# Patient Record
Sex: FEMALE | HISPANIC OR LATINO | Employment: OTHER | URBAN - METROPOLITAN AREA
[De-identification: names, ages, dates, MRNs, and addresses within clinical notes are randomized per-mention and may not be internally consistent; named-entity substitution may affect disease eponyms.]

---

## 2016-10-31 LAB
C TRACH DNA SPEC QL PROBE+SIG AMP: NEGATIVE
N GONORRHOEA DNA SPEC QL PROBE+SIG AMP: NEGATIVE

## 2016-11-11 LAB
HBV SURFACE AG SERPL QL IA: NEGATIVE
HIV 1+2 AB+HIV1 P24 AG SERPL QL IA: NEGATIVE
RUBELLA ANTIBODY IGG QUANTITATIVE: NORMAL IU/ML

## 2017-03-12 ENCOUNTER — TELEPHONE (OUTPATIENT)
Dept: OBGYN | Facility: CLINIC | Age: 32
End: 2017-03-12

## 2017-03-12 NOTE — TELEPHONE ENCOUNTER
Patient had a clinical confirmation done at [facility] in [place]. Can she skip the ob confirmation with Jing and just schedule her new prenatal visit with Dr. Buchanan? She is also about 23 weeks pregnant right now. What is recommended? Please call to discuss and have  available.    Kenzie ODOM  Central Scheduler

## 2017-03-13 NOTE — TELEPHONE ENCOUNTER
Called  services and patient, scheduled her OB appointment at  Clinic on 03/20/2017 with Dr. Buchanan.  Sangita Jarrett CMA

## 2017-03-20 ENCOUNTER — PRENATAL OFFICE VISIT (OUTPATIENT)
Dept: OBGYN | Facility: CLINIC | Age: 32
End: 2017-03-20
Payer: COMMERCIAL

## 2017-03-20 VITALS
WEIGHT: 136 LBS | SYSTOLIC BLOOD PRESSURE: 98 MMHG | HEIGHT: 59 IN | HEART RATE: 73 BPM | DIASTOLIC BLOOD PRESSURE: 57 MMHG | TEMPERATURE: 97.1 F | BODY MASS INDEX: 27.42 KG/M2

## 2017-03-20 DIAGNOSIS — Z34.80 SUPERVISION OF OTHER NORMAL PREGNANCY, ANTEPARTUM: ICD-10-CM

## 2017-03-20 PROCEDURE — 99212 OFFICE O/P EST SF 10 MIN: CPT | Performed by: OBSTETRICS & GYNECOLOGY

## 2017-03-20 RX ORDER — ECHINACEA PURPUREA EXTRACT 125 MG
1-2 TABLET ORAL
COMMUNITY
Start: 2017-02-24 | End: 2017-03-20

## 2017-03-20 RX ORDER — HYDROXYZINE HYDROCHLORIDE 10 MG/5ML
4 SYRUP ORAL
COMMUNITY
Start: 2017-02-24 | End: 2017-03-20

## 2017-03-20 RX ORDER — PYRIDOXINE HCL (VITAMIN B6) 25 MG
25 TABLET ORAL
COMMUNITY
Start: 2016-11-11 | End: 2017-07-24

## 2017-03-20 RX ORDER — MULTIVIT 47/IRON/FOLATE 1/DHA 27-1-300MG
CAPSULE ORAL
COMMUNITY
Start: 2016-11-11 | End: 2018-06-08

## 2017-03-20 NOTE — MR AVS SNAPSHOT
After Visit Summary   3/20/2017    Jo Ann Beckman    MRN: [id number]           Patient Information     Date Of Birth          1985        Visit Information        Provider Department      3/20/2017 2:30 PM Arnaldo Buchanan MD; [employer] [facility]        Care Instructions                                                        If you have any questions regarding your visit, Please contact your care team.     [facility]: [phone]    [facility] CLINIC HOURS TELEPHONE NUMBER   Arnaldo Buchanan M.D.      [place]-    Tyrese Lopez-FRANCOISE Nix-Medical Assistant   Monday-[place]  8:00a.m-4:45 p.m  Wednesday-[place] 8:00a.m-4:45 p.m.  [facility]  8:00a.m-4:45 p.m.  Friday-[place]  8:00a.m-4:45 p.m. [facility]  [address]  [place], [place] [address] [phone] ask [facility]  [phone] Fax  Imaging [phone]    [facility] Labor and Delivery  [address] [place] [address] Dr.  [place], [place] [address] [phone]    [facility]  [address]  [place], [place] [address] [phone] ask [facility]  [phone] Fax  Imaging [phone]     Urgent Care locations:    [facility] Monday-Friday  5 pm - 9 pm  Saturday and Sunday   9 am - 5 pm    Monday-Friday   11 am - 9 pm  Saturday and Sunday   9 am - 5 pm   [phone]       If you need a medication refill, please contact your pharmacy. Please allow 3 business days for your refill to be completed.  As always, Thank you for trusting us with your healthcare needs!          Follow-ups after your visit        Who to contact     If you have questions or need follow up information about today's clinic visit or your schedule please contact [facility] directly at [phone].  Normal or non-critical lab and imaging results will be  "communicated to you by Zomazzhart, letter or phone within 4 business days after the clinic has received the results. If you do not hear from us within 7 days, please contact the clinic through Ziegler or phone. If you have a critical or abnormal lab result, we will notify you by phone as soon as possible.  Submit refill requests through Ziegler or call your pharmacy and they will forward the refill request to us. Please allow 3 business days for your refill to be completed.          Additional Information About Your Visit        Ziegler Information     Ziegler lets you send messages to your doctor, view your test results, renew your prescriptions, schedule appointments and more. To sign up, go to [email or web address] . Click on \"Log in\" on the left side of the screen, which will take you to the Welcome page. Then click on \"Sign up Now\" on the right side of the page.     You will be asked to enter the access code listed below, as well as some personal information. Please follow the directions to create your username and password.     Your access code is: [email or web address]  Expires: 2017  2:48 PM     Your access code will  in 90 days. If you need help or a new code, please call your [place] clinic or [phone].        Care EveryWhere ID     This is your Care EveryWhere ID. This could be used by other organizations to access your [place] medical records  [id number]        Your Vitals Were     Pulse Temperature Last Period Breastfeeding?          73 97.1  F (36.2  C) (Oral) 10/01/2016 No         Blood Pressure from Last 3 Encounters:   17 98/57    Weight from Last 3 Encounters:   17 61.7 kg (136 lb)              Today, you had the following     No orders found for display         Today's Medication Changes          These changes are accurate as of: 3/20/17  2:48 PM.  If you have any questions, ask your nurse or doctor.               Stop taking these medicines if you haven't already. Please " contact your care team if you have questions.     chlorpheniramine 4 MG tablet   Commonly known as:  CHLOR-TRIMETON   Stopped by:  Arnaldo Buchanan MD           sodium chloride 0.65 % nasal spray   Commonly known as:  OCEAN   Stopped by:  Arnaldo Buchanan MD                    Primary Care Provider    [facility]       No address on file        Thank you!     Thank you for choosing [facility]  for your care. Our goal is always to provide you with excellent care. Hearing back from our patients is one way we can continue to improve our services. Please take a few minutes to complete the written survey that you may receive in the mail after your visit with us. Thank you!             Your Updated Medication List - Protect others around you: Learn how to safely use, store and throw away your medicines at www.disposemymeds.org.          This list is accurate as of: 3/20/17  2:48 PM.  Always use your most recent med list.                   Brand Name Dispense Instructions for use    pyridOXINE 25 MG tablet    vitamin B-6     Take 25 mg by mouth       NERIS DHA 27-0.6-0.4-300 MG Caps

## 2017-03-20 NOTE — NURSING NOTE
"Chief Complaint   Patient presents with     Prenatal Care     1st initial OB visit       Initial BP 98/57 (BP Location: Right arm, Patient Position: Chair, Cuff Size: Adult Regular)  Pulse 73  Temp 97.1  F (36.2  C) (Oral)  Ht 1.505 m (4' 11.25\")  Wt 61.7 kg (136 lb)  LMP 10/01/2016  Breastfeeding? No  BMI 27.24 kg/m2 Estimated body mass index is 27.24 kg/(m^2) as calculated from the following:    Height as of this encounter: 1.505 m (4' 11.25\").    Weight as of this encounter: 61.7 kg (136 lb).  Medication Reconciliation: complete   Boyd Smith CMA      "

## 2017-03-20 NOTE — PATIENT INSTRUCTIONS
If you have any questions regarding your visit, Please contact your care team.     [facility] [place] [facility]: [phone]    [facility] CLINIC HOURS TELEPHONE NUMBER   KAT Snowden-    Tyrese Lopez-FRANCOISE Nix-Medical Assistant   Monday-[place]  8:00a.m-4:45 p.m  Wednesday-[place] 8:00a.m-4:45 p.m.  Thursday-[place]  8:00a.m-4:45 p.m.  Friday-[place]  8:00a.m-4:45 p.m. [facility]  [address]  [place], [place] [address] [phone] ask [facility]  [phone] Fax  Imaging [phone]    [facility] Labor and Delivery  [address] [place] [address] Dr.  [place], [place] [address] [phone]    [facility]  [address] [place] [address]  [phone] ask [facility]  [phone] Fax  Imaging [phone]     Urgent Care locations:    [place]        [place] Monday-Friday  5 pm - 9 pm  Saturday and Sunday   9 am - 5 pm    Monday-Friday   11 am - 9 pm  Saturday and Sunday   9 am - 5 pm   [phone]       If you need a medication refill, please contact your pharmacy. Please allow 3 business days for your refill to be completed.  As always, Thank you for trusting us with your healthcare needs!

## 2017-03-21 PROBLEM — Z34.80 SUPERVISION OF OTHER NORMAL PREGNANCY, ANTEPARTUM: Status: ACTIVE | Noted: 2017-03-21

## 2017-03-22 NOTE — PROGRESS NOTES
"\"Sheryl savage uh\" OB Transfer from . Referred by friend/pt and desires [facility]. Recent bronchitis that is improving after abx. Recent survey u/s suggested ?hydrocephalus and has Level 2 u/s with MFM soon- direct report to me. No signif signs, symptoms or concerns otherwise now. Discussed continuing OB care. Here with  and . Advice as per Checklist update. Discussed condition, tests and care plan including RBA. Problem list updated. 1h GTT next.  Arnaldo Buchanan MD       "

## 2017-04-18 LAB — T PALLIDUM IGG SER QL: NEGATIVE

## 2017-05-16 LAB — GLU GEST SCREEN 1HR 50G: 110

## 2017-05-18 ENCOUNTER — PRE VISIT (OUTPATIENT)
Dept: MATERNAL FETAL MEDICINE | Facility: CLINIC | Age: 32
End: 2017-05-18

## 2017-05-18 DIAGNOSIS — O35.06X0 FETAL HYDROCEPHALUS AFFECTING MANAGEMENT OF MOTHER IN SINGLETON PREGNANCY: Primary | ICD-10-CM

## 2017-05-18 DIAGNOSIS — Z34.80 SUPERVISION OF OTHER NORMAL PREGNANCY, ANTEPARTUM: ICD-10-CM

## 2017-05-18 PROBLEM — O35.00X0 MATERNAL CARE FOR SUSPECTED CENTRAL NERVOUS SYSTEM MALFORMATION IN FETUS: Status: ACTIVE | Noted: 2017-03-21

## 2017-05-18 PROBLEM — O09.90 HIGH-RISK PREGNANCY: Status: ACTIVE | Noted: 2017-03-27

## 2017-05-24 ENCOUNTER — OFFICE VISIT (OUTPATIENT)
Dept: MATERNAL FETAL MEDICINE | Facility: CLINIC | Age: 32
End: 2017-05-24
Attending: OBSTETRICS & GYNECOLOGY
Payer: COMMERCIAL

## 2017-05-24 ENCOUNTER — HOSPITAL ENCOUNTER (OUTPATIENT)
Dept: ULTRASOUND IMAGING | Facility: CLINIC | Age: 32
Discharge: HOME | End: 2017-05-24
Attending: OBSTETRICS & GYNECOLOGY | Admitting: OBSTETRICS & GYNECOLOGY
Payer: COMMERCIAL

## 2017-05-24 VITALS
SYSTOLIC BLOOD PRESSURE: 110 MMHG | DIASTOLIC BLOOD PRESSURE: 67 MMHG | BODY MASS INDEX: 29.52 KG/M2 | RESPIRATION RATE: 18 BRPM | WEIGHT: 147.4 LBS | HEART RATE: 74 BPM

## 2017-05-24 DIAGNOSIS — O35.06X0 FETAL HYDROCEPHALUS AFFECTING MANAGEMENT OF MOTHER IN SINGLETON PREGNANCY: ICD-10-CM

## 2017-05-24 DIAGNOSIS — Z34.80 SUPERVISION OF OTHER NORMAL PREGNANCY, ANTEPARTUM: ICD-10-CM

## 2017-05-24 PROCEDURE — 76819 FETAL BIOPHYS PROFIL W/O NST: CPT | Performed by: OBSTETRICS & GYNECOLOGY

## 2017-05-24 PROCEDURE — 76811 OB US DETAILED SNGL FETUS: CPT

## 2017-05-24 PROCEDURE — 99211 OFF/OP EST MAY X REQ PHY/QHP: CPT

## 2017-05-24 NOTE — MR AVS SNAPSHOT
After Visit Summary   2017    Jo Ann Patel    MRN: [id number]           Patient Information     Date Of Birth          1985        Visit Information        Provider Department      2017 11:00 AM Alexis Odom MD [facility] [place]        Today's Diagnoses     Fetal hydrocephalus affecting management of mother in thomas pregnancy        Supervision of other normal pregnancy, antepartum           Follow-ups after your visit        Additional Services     MFM Office Visit           MFM Office Visit           MFM Office Visit           MFM Office Visit  (Weekly)                Your next 10 appointments already scheduled     May 31, 2017  1:30 PM CDT   New Patient Visit with Antoni Cruz MD   Peds Neurosurgery ([facility])    [facility]  [address]  [address] [place] [address]  [facility] [address]   [phone]            May 31, 2017  2:15 PM CDT   MFM BPP SINGLE with URMFMUSR4   [facility] [place] ([facility])    [address]  [facility] [address] [phone]            May 31, 2017  3:00 PM CDT   Ob Follow Up with UR EXAM RM 2   [facility] [place] ([facility])    [address]  [facility] [address]   [phone]            May 31, 2017  3:30 PM CDT    Visit with UR EXAM RM 2   [facility] [place] ([facility])    [address]  [facility] [address]   [phone]            May 31, 2017  3:30 PM CDT    Consult with UR  MD   [facility] [place] ([facility])    [address]  [facility] [address]   [phone]            2017  2:15 PM CDT   MFM BPP SINGLE with URMFMUSR3   [facility]  [facility]ound - [place] ([facility])    [address]  [facility] [address]   [phone]            Jun 05, 2017  3:00 PM CDT   Ob Follow Up with UR EXAM RM 2   [facility] [place] ([facility])    [address]  [facility] [address]   [phone]            Jun 12, 2017  1:30 PM CDT   MFM US COMPRE SINGLE F/U with URMFMUSR2   [facility] [place] ([facility])    [address]  [facility] [address]   [phone]           Wear comfortable clothes and leave your valuables at home.            Jun 12, 2017  2:15 PM CDT   Ob Follow Up with UR EXAM RM 2   [facility] [place] ([facility])    [address]  [facility] [address]   [phone]            Jun 19, 2017  1:30 PM CDT   MFM BPP SINGLE with URMFMUSR2   [facility] [place] ([facility])    [address]  [facility] [address]   [phone]              Future tests that were ordered for you today     Open Standing Orders        Priority Remaining Interval Expires Ordered    MFM Office Visit Routine 3/3 Weekly 5/24/2018 5/24/2017          Open Future Orders        Priority Expected Expires Ordered    MFM US Comprehensive Single F/U Routine 6/12/2017 5/24/2018 5/24/2017    MFM BPP Single Routine  3/24/2018 5/24/2017    MFM BPP Single Routine  3/24/2018 5/24/2017    MFM BPP Single Routine 5/31/2017 3/24/2018 5/24/2017    MFM Office Visit Routine 5/31/2017 5/24/2018 5/24/2017    MFM Office Visit Routine 5/31/2017 5/24/2018 5/24/2017    MFM Office Visit Routine  5/24/2018 5/24/2017            Who to contact     If you have questions or need follow up information about today's clinic visit or your schedule please contact  "[facility] directly at [phone].  Normal or non-critical lab and imaging results will be communicated to you by MyChart, letter or phone within 4 business days after the clinic has received the results. If you do not hear from us within 7 days, please contact the clinic through SIPphonehart or phone. If you have a critical or abnormal lab result, we will notify you by phone as soon as possible.  Submit refill requests through [email or web address] or call your pharmacy and they will forward the refill request to us. Please allow 3 business days for your refill to be completed.          Additional Information About Your Visit        SIPphoneharWeplay Information     [email or web address] lets you send messages to your doctor, view your test results, renew your prescriptions, schedule appointments and more. To sign up, go to [email or web address] . Click on \"Log in\" on the left side of the screen, which will take you to the Welcome page. Then click on \"Sign up Now\" on the right side of the page.     You will be asked to enter the access code listed below, as well as some personal information. Please follow the directions to create your username and password.     Your access code is: [email or web address]  Expires: 2017  2:48 PM     Your access code will  in 90 days. If you need help or a new code, please call your [place] clinic or [phone].        Care EveryWhere ID     This is your Care EveryWhere ID. This could be used by other organizations to access your [place] medical records  [id number]        Your Vitals Were     Pulse Respirations Last Period BMI (Body Mass Index)          74 18 10/01/2016 29.52 kg/m2         Blood Pressure from Last 3 Encounters:   17 110/67   17 98/57    Weight from Last 3 Encounters:   17 66.9 kg (147 lb 6.4 oz)   17 61.7 kg (136 lb)              We Performed the Following     [facility] Office Visit        Primary Care Provider    [facility]       No address on file        Thank you!     Thank you for choosing [facility]  for your care. Our goal is always to provide you with excellent care. Hearing back from our patients is one way we can continue to improve our services. Please take a few minutes to complete the written survey that you may receive in the mail after your visit with us. Thank you!             Your Updated Medication List - Protect others around you: Learn how to safely use, store and throw away your medicines at www.disposemymeds.org.          This list is accurate as of: 5/24/17  2:24 PM.  Always use your most recent med list.                   Brand Name Dispense Instructions for use    pyridOXINE 25 MG tablet    vitamin B-6     Take 25 mg by mouth       NERIS DHA 27-0.6-0.4-300 MG Caps

## 2017-05-24 NOTE — PROGRESS NOTES
Please refer to US report and consultation under 'Image' results of the 'Chart review' tab.    Alexis Odom M.D.      I have discussed today's finding of bilateral severe ventriculomegaly in this female fetus at 33 weeks and 4 days. The patient was being followed at outside institution but is transferring her care secondary to insurance. The ventriculomegaly was initially diagnosed at approximately 20 weeks.     We discussed the finding of severe bilateral cerebral ventriculomegaly and third ventricle dilatation. The 4th ventricle and the cisterna magna do not appear dilated.  The cerebral vasculature appears normal.     We also reviewed the fact that there were no additional US findings on the brain scan or while scanning the rest of the body.  I have discussed that approximately 10-50% of infants in whom severe ventriculomegaly is diagnosed prenatally and among whom it persists postnatally can have associated developmental abnormalities. The severity of disabilty depends on associated findings.     We also discussed the importance of performing adjunct testing to rule out genetic, infectious and other cerebral abnormalities. I have discussed the etiologies can involve inflammation secondary to congenital infections, structural abnormalities such as isolated aqueductal stenosis and genetic causes including aneuploidy, deletions, duplications or mutations. At  this gestational age it would be reasonable to defer additional testing until after delivery. The most important would be genetic evaluation with CGA and imaging studies with MRI. There are no findings concerning for CMV or toxoplasmosis on today's scan.    The patient agrees with this plan and will return for weekly prenatal care as well as for repeat growth assessment in 3 weeks.    We briefly discussed timing and mode of delivery. BPD today was greater than 10 cm making VD unlikely. I would recommend primary CD at term with precautions to avoid uterine  vessel lacerations secondary to very large head circumference.    Patient has appointments with neonatology and neurosurgery scheduled for next week. Will discuss timing of delivery in detail with both teams.    I spent a total of 40 minutes face-to-face with this patient counseling and coordinating care as described above for fetal ventriculomegaly. More than 50% of the time was in coordination of care and discussion of management of care.  A copy of this consultation is being faxed to your office.    Sincerely,  Alexis Odom M.D.  Maternal Fetal Medicine

## 2017-05-24 NOTE — NURSING NOTE
Jo Ann seen in clinic today for L2 and new OB visit at 33w4d (see report/notes).  present. VSS. Pt denies bldg/lof/change in discharge/contractions/headache/vision changes/chest pain/SOB/edema. Reviewed new OB folder; kick counts, PTL, HTN, phone numbers to call.  All teaching/info sheets given to patient in Swedish version. Discussed future visits. Dr. Odom met with pt and discussed POC. Plan weekly follow up with MFM: weekly OB visit, BPP, growth q 3 weeks, NICU/SW, and neurosurgery consult scheduled for 5/31.  Patient verbalized understanding. Pt discharged stable and ambulatory.     NICK Breaux RN  RN F:F 15 min

## 2017-05-31 ENCOUNTER — OFFICE VISIT (OUTPATIENT)
Dept: NEUROSURGERY | Facility: CLINIC | Age: 32
End: 2017-05-31
Attending: NEUROLOGICAL SURGERY
Payer: COMMERCIAL

## 2017-05-31 ENCOUNTER — HOSPITAL ENCOUNTER (OUTPATIENT)
Dept: ULTRASOUND IMAGING | Facility: CLINIC | Age: 32
Discharge: HOME | End: 2017-05-31
Attending: OBSTETRICS & GYNECOLOGY | Admitting: OBSTETRICS & GYNECOLOGY
Payer: COMMERCIAL

## 2017-05-31 ENCOUNTER — OFFICE VISIT (OUTPATIENT)
Dept: MATERNAL FETAL MEDICINE | Facility: CLINIC | Age: 32
End: 2017-05-31
Attending: OBSTETRICS & GYNECOLOGY
Payer: COMMERCIAL

## 2017-05-31 VITALS
RESPIRATION RATE: 18 BRPM | BODY MASS INDEX: 29.32 KG/M2 | HEART RATE: 75 BPM | SYSTOLIC BLOOD PRESSURE: 115 MMHG | WEIGHT: 146.4 LBS | DIASTOLIC BLOOD PRESSURE: 70 MMHG

## 2017-05-31 DIAGNOSIS — Z71.9 ENCOUNTER FOR COUNSELING: Primary | ICD-10-CM

## 2017-05-31 DIAGNOSIS — O35.06X0 FETAL HYDROCEPHALUS AFFECTING MANAGEMENT OF MOTHER IN SINGLETON PREGNANCY: ICD-10-CM

## 2017-05-31 DIAGNOSIS — Z34.80 SUPERVISION OF OTHER NORMAL PREGNANCY, ANTEPARTUM: ICD-10-CM

## 2017-05-31 DIAGNOSIS — O09.93 SUPERVISION OF HIGH RISK PREGNANCY, UNSPECIFIED, THIRD TRIMESTER: Primary | ICD-10-CM

## 2017-05-31 DIAGNOSIS — O35.00X0 MATERNAL CARE FOR SUSPECTED CENTRAL NERVOUS SYSTEM MALFORMATION IN FETUS, NOT APPLICABLE OR UNSPECIFIED FETUS: Primary | ICD-10-CM

## 2017-05-31 PROCEDURE — 99212 OFFICE O/P EST SF 10 MIN: CPT | Mod: ZF

## 2017-05-31 PROCEDURE — 99211 OFF/OP EST MAY X REQ PHY/QHP: CPT | Mod: 25,ZF

## 2017-05-31 PROCEDURE — 76819 FETAL BIOPHYS PROFIL W/O NST: CPT

## 2017-05-31 NOTE — MR AVS SNAPSHOT
After Visit Summary   5/31/2017    Jo Ann Patel    MRN: [id number]           Patient Information     Date Of Birth          1985        Visit Information        Provider Department      5/31/2017 3:00 PM Mercedes Nieves MD [facility] [place]        Today's Diagnoses     Supervision of high risk pregnancy, unspecified, third trimester    -  1    Fetal hydrocephalus affecting management of mother in thomas pregnancy           Follow-ups after your visit        Your next 10 appointments already scheduled     Jun 05, 2017  2:15 PM CDT   MFM BPP SINGLE with URMFMUSR3   [facility] [place] ([facility])    [address]  [facility] [address]   [phone]            Jun 05, 2017  3:00 PM CDT   Ob Follow Up with UR EXAM  2   [facility] [place] ([facility])    [address]  [facility] [address]   [phone]            Jun 12, 2017  1:30 PM CDT   M US COMPRE SINGLE F/U with URMFMUSR2   [facility] [place] ([facility])    [address]  [facility] [address] [phone]           Wear comfortable clothes and leave your valuables at home.            Jun 12, 2017  2:15 PM CDT   Ob Follow Up with UR EXAM  2   [facility] [place] ([facility])    [address]  [facility] [address]   [phone]            Jun 19, 2017  1:30 PM CDT   M BPP SINGLE with URMFMUSR2   [facility] [place] ([facility])    [address]  [facility] [address]   [phone]            Jun 19, 2017  2:15 PM CDT   Ob Follow Up with UR EXAM RM 2   [facility] [place] ([facility]  "[facility])    [address]  [place] [address]   [phone]              Who to contact     If you have questions or need follow up information about today's clinic visit or your schedule please contact [facility]AL MEDICINE [facility] directly at [phone].  Normal or non-critical lab and imaging results will be communicated to you by MyChart, letter or phone within 4 business days after the clinic has received the results. If you do not hear from us within 7 days, please contact the clinic through MyChart or phone. If you have a critical or abnormal lab result, we will notify you by phone as soon as possible.  Submit refill requests through Spiralcat or call your pharmacy and they will forward the refill request to us. Please allow 3 business days for your refill to be completed.          Additional Information About Your Visit        MyChart Information     Spiralcat lets you send messages to your doctor, view your test results, renew your prescriptions, schedule appointments and more. To sign up, go to [email or web address] . Click on \"Log in\" on the left side of the screen, which will take you to the Welcome page. Then click on \"Sign up Now\" on the right side of the page.     You will be asked to enter the access code listed below, as well as some personal information. Please follow the directions to create your username and password.     Your access code is: [email or web address]  Expires: 2017  2:48 PM     Your access code will  in 90 days. If you need help or a new code, please call your [place] clinic or [phone].        Care EveryWhere ID     This is your Care EveryWhere ID. This could be used by other organizations to access your [place] medical records  [id number]        Your Vitals Were     Pulse Respirations Last Period BMI (Body Mass Index)          75 18 10/01/2016 29.32 kg/m2         Blood Pressure from Last 3 Encounters:   17 115/70 "   05/24/17 110/67   03/20/17 98/57    Weight from Last 3 Encounters:   05/31/17 66.4 kg (146 lb 6.4 oz)   05/24/17 66.9 kg (147 lb 6.4 oz)   03/20/17 61.7 kg (136 lb)              We Performed the Following     MFM Office Visit        Primary Care Provider    [facility]       No address on file        Thank you!     Thank you for choosing [facility]  for your care. Our goal is always to provide you with excellent care. Hearing back from our patients is one way we can continue to improve our services. Please take a few minutes to complete the written survey that you may receive in the mail after your visit with us. Thank you!             Your Updated Medication List - Protect others around you: Learn how to safely use, store and throw away your medicines at www.disposemymeds.org.          This list is accurate as of: 5/31/17 11:59 PM.  Always use your most recent med list.                   Brand Name Dispense Instructions for use    pyridOXINE 25 MG tablet    vitamin B-6     Take 25 mg by mouth       NERIS DHA 27-0.6-0.4-300 MG Caps

## 2017-05-31 NOTE — PATIENT INSTRUCTIONS
You met with Pediatric Neurosurgery at the [facility]    Dr. Antoni Cruz, Dr. Alexis Bustos, Dr. Edmundo Sánchez,  Daya Gunn, JC, Yasmeen Plascencia NP    Pediatric Appointment Scheduling and Call Center [phone]  Radha Cruz RNCC, [phone]    Clinic Fax Number: [phone]    For Urgent Matters that cannot wait until the next business day, is over a holiday and/or a weekend, please call [phone] and ask to page the Pediatric Neurosurgery Resident on call.

## 2017-05-31 NOTE — MR AVS SNAPSHOT
After Visit Summary   2017    Jo Ann Patel    MRN: [id number]           Patient Information     Date Of Birth          1985        Visit Information        Provider Department      2017 1:15 PM Antoni Cruz MD; [employer] Peds Neurosurgery        Care Instructions    You met with Pediatric Neurosurgery at the [facility]    Dr. Antoni Cruz, Dr. Alexis Bustos, Dr. Edmundo Sánchez,  Daya Gunn, JC, Yasmeen Plascencia NP    Pediatric Appointment Scheduling and Call Center [phone]  Radha Cruz [facility], [phone]    Clinic Fax Number: [phone]    For Urgent Matters that cannot wait until the next business day, is over a holiday and/or a weekend, please call [phone] and ask to page the Pediatric Neurosurgery Resident on call.          Follow-ups after your visit        Your next 10 appointments already scheduled     May 31, 2017  3:00 PM CDT   Ob Follow Up with UR EXAM RM 2   [facility])    [address]  [facility] [address]   [phone]            May 31, 2017  3:30 PM CDT    Visit with UR EXAM RM 2   [facility])    [address]  [facility] [address]   [phone]            May 31, 2017  3:30 PM CDT    Consult with UR  MD   [facility]    [address]  [facility] [address]   [phone]            2017  2:15 PM CDT   MFM BPP SINGLE with URMFMUSR3   [facility])    [address]  [facility] [address]   [phone]            2017  3:00 PM CDT   Ob Follow Up with UR EXAM RM 2   [facility]  [place] [facility])    [address]  [facility] [address]   [phone]            Jun 12, 2017  1:30 PM CDT   MFM US COMPRE SINGLE F/U with URMFMUSR2   [facility] [place] ([facility])    [address]  [facility] [address] [phone]           Wear comfortable clothes and leave your valuables at home.            Jun 12, 2017  2:15 PM CDT   Ob Follow Up with UR EXAM RM 2   [facility] [place] [facility])    [address]  [facility] [address]   [phone]            Jun 19, 2017  1:30 PM CDT   MFM BPP SINGLE with URMFMUSR2   [facility] [place] ([facility])    [address]  [facility] [address] [phone]            Jun 19, 2017  2:15 PM CDT   Ob Follow Up with UR EXAM RM 2   [facility] [place] [facility])    [address]  [facility] [address]   [phone]              Who to contact     Please call your clinic at [phone] to:    Ask questions about your health    Make or cancel appointments    Discuss your medicines    Learn about your test results    Speak to your doctor   If you have compliments or concerns about an experience at your clinic, or if you wish to file a complaint, please contact [facility] Patient Relations at [phone] or email us at [email or web address]         Additional Information About Your Visit        [employer] Information     [employer] is an electronic gateway that provides easy, online access to your medical records. With [employer], you can request a clinic appointment, read your test results, renew a prescription or communicate with your care team.     To sign up for [employer] visit the website  at [email or web address]   You will be asked to enter the access code listed below, as well as some personal information. Please follow the directions to create your username and password.     Your access code is: [email or web address]  Expires: 2017  2:48 PM     Your access code will  in 90 days. If you need help or a new code, please contact your [facility] or call [phone] for assistance.        Care EveryWhere ID     This is your Care EveryWhere ID. This could be used by other organizations to access your [place] medical records  [id number]        Your Vitals Were     Last Period                   10/01/2016            Blood Pressure from Last 3 Encounters:   17 110/67   17 98/57    Weight from Last 3 Encounters:   17 66.9 kg (147 lb 6.4 oz)   17 61.7 kg (136 lb)              Today, you had the following     No orders found for display       Primary Care Provider    [facility]       No address on file        Thank you!     Thank you for choosing PEDS NEUROSURGERY  for your care. Our goal is always to provide you with excellent care. Hearing back from our patients is one way we can continue to improve our services. Please take a few minutes to complete the written survey that you may receive in the mail after your visit with us. Thank you!             Your Updated Medication List - Protect others around you: Learn how to safely use, store and throw away your medicines at www.disposemymeds.org.          This list is accurate as of: 17  2:28 PM.  Always use your most recent med list.                   Brand Name Dispense Instructions for use    pyridOXINE 25 MG tablet    vitamin B-6     Take 25 mg by mouth       ZATEAN-PN DHA 27-0.6-0.4-300 MG Caps

## 2017-05-31 NOTE — LETTER
2017      RE: Jo Ann Patel  [address]   [place] [facility] [address]       It was a pleasure seeing Jo Ann in Neurosurgery Clinic today.  This patient is here for a prenatal consultation regarding fetal diagnosis of ventriculomegaly.  This was diagnosed on routine prenatal ultrasonography.  Her last ultrasound showed continued ventriculomegaly on 2017 and she is referred here.  She has been doing well and has no concerns.  She is due to deliver in about 6 weeks.  They are planning a  section here.      I reviewed the ultrasound findings with her from the most recent ultrasound 1 week ago.  This reveals evidence of moderate ventriculomegaly involving lateral and third ventricles with unclear dilation of the fourth ventricle.  There are no other clear imaging abnormalities.  The brain parenchyma does not have any obvious abnormalities.  I did not feel that this was severe, as noted by the radiologist, but is in more of a moderate or possibly even mild range.  We discussed the plan, which would be, following delivery, a full exam of her baby including a full neurological exam.  If there were no concerns of intracranial hypertension, the baby would undergo magnetic resonance imaging study to evaluate for aqueductal stenosis or other obstructive lesions as well as to evaluate degree of ventriculomegaly.  Based on the findings of the exam and MRI, a final decision would be made to treat or to observe.  We discussed treatment options, which could include placement of a ventriculoperitoneal shunt or endoscopic third ventriculostomy.  I discussed the details of shunting and we explained that hydrocephalus is a lifelong condition that will require frequent followup visits and, in many cases, several procedures over a lifetime.  We did not get into the details of endoscopic third ventriculostomy, but I did tell them there was a second bypass procedure, which may be an option based  on the MRI findings and we can discuss that later if needed.  Yasmeen Plascencia, our nurse practitioner, also went in to do some  shunt educational teaching with the family and to give them a shunt book in Malaysian.  Of note, the entire visit was done with the help of a .      All questions were answered and we look forward to treating her further.         BRIA JC MD             D: 2017 14:01   T: 2017 07:14   MT: JEF      Name:     PEPPER BYRD   MRN:      [id number]        Account:      [id number]   :      1985           Service Date: 2017      Document: [id number]

## 2017-05-31 NOTE — NURSING NOTE
Jo Ann seen in clinic today for BPP and OB visit at 34w4d due to pregnancy c/b bilateral hydrocephalus (see report/notes).  Jo Ann met with Dr. Cruz prior to M visit. VSS. Pt denies bldg/lof/change in discharge/contractions/headache/vision changes/chest pain/SOB.  1+ edema bilateral lower extremities. Dr. Nieves met with pt and discussed POC. NICU/SW consult today as well.  Reviewed fetal kick counts and PTL precautions, phone numbers to call. Pt discharged stable and ambulatory. F/U visits scheduled to 39 weeks. Patient would like NICU tour on Monday June 5th when  is here.      Nicolasa Breaux RN  RN F:F 15min

## 2017-05-31 NOTE — MR AVS SNAPSHOT
After Visit Summary   2017    Jo Ann Patel    MRN: [id number]           Patient Information     Date Of Birth          1985        Visit Information        Provider Department      2017 3:30 PM UR  MD [facility] [place]        Today's Diagnoses     Encounter for counseling    -  1    Fetal hydrocephalus affecting management of mother in thomas pregnancy        Supervision of other normal pregnancy, antepartum           Follow-ups after your visit        Your next 10 appointments already scheduled     2017  2:15 PM CDT   MFM BPP SINGLE with URMFMUSR3   [facility] [place] ([facility])    [address]  [facility] [address]   [phone]            2017  3:00 PM CDT   Ob Follow Up with UR EXAM  2   [facility] [place] ([facility])    [address]  [facility] [address]   [phone]            2017  1:30 PM CDT   M US COMPRE SINGLE F/U with URMFMUSR2   [facility] [place] ([facility])    [address]  [facility] [address]   [phone]           Wear comfortable clothes and leave your valuables at home.            2017  2:15 PM CDT   Ob Follow Up with UR EXAM RM 2   [facility] [place] ([facility])    [address]  [facility] [address]   [phone]            2017  1:30 PM CDT   MFM BPP SINGLE with URMFMUSR2   [facility] [place] ([facility])    [address]  [facility] [address]   [phone]            2017  2:15 PM CDT   Ob Follow Up with UR EXAM RM 2   [facility] [place]  "([facility])    [address]  [place] [address]   [phone]              Who to contact     If you have questions or need follow up information about today's clinic visit or your schedule please contact [facility] directly at [phone].  Normal or non-critical lab and imaging results will be communicated to you by MyChart, letter or phone within 4 business days after the clinic has received the results. If you do not hear from us within 7 days, please contact the clinic through via680hart or phone. If you have a critical or abnormal lab result, we will notify you by phone as soon as possible.  Submit refill requests through [employer] or call your pharmacy and they will forward the refill request to us. Please allow 3 business days for your refill to be completed.          Additional Information About Your Visit        via680harAshland[facility] Information     [employer] lets you send messages to your doctor, view your test results, renew your prescriptions, schedule appointments and more. To sign up, go to [email or web address] . Click on \"Log in\" on the left side of the screen, which will take you to the Welcome page. Then click on \"Sign up Now\" on the right side of the page.     You will be asked to enter the access code listed below, as well as some personal information. Please follow the directions to create your username and password.     Your access code is: [email or web address]  Expires: 2017  2:48 PM     Your access code will  in 90 days. If you need help or a new code, please call your [facility] clinic or [phone].        Care EveryWhere ID     This is your Care EveryWhere ID. This could be used by other organizations to access your [facility] medical records  [id number]        Your Vitals Were     Last Period                   10/01/2016            Blood Pressure from Last 3 Encounters:   17 115/70   17 110/67   17 98/57    " Weight from Last 3 Encounters:   05/31/17 66.4 kg (146 lb 6.4 oz)   05/24/17 66.9 kg (147 lb 6.4 oz)   03/20/17 61.7 kg (136 lb)              We Performed the Following     MFM Office Visit        Primary Care Provider    [facility]       No address on file        Thank you!     Thank you for choosing [facility]  for your care. Our goal is always to provide you with excellent care. Hearing back from our patients is one way we can continue to improve our services. Please take a few minutes to complete the written survey that you may receive in the mail after your visit with us. Thank you!             Your Updated Medication List - Protect others around you: Learn how to safely use, store and throw away your medicines at www.disposemymeds.org.          This list is accurate as of: 5/31/17 11:59 PM.  Always use your most recent med list.                   Brand Name Dispense Instructions for use    pyridOXINE 25 MG tablet    vitamin B-6     Take 25 mg by mouth       NERIS DHA 27-0.6-0.4-300 MG Caps

## 2017-06-01 ENCOUNTER — TELEPHONE (OUTPATIENT)
Dept: CARE COORDINATION | Facility: CLINIC | Age: 32
End: 2017-06-01

## 2017-06-01 NOTE — PROGRESS NOTES
[facility]  MATERNAL CHILD HEALTH   SOCIAL WORK PROGRESS NOTE    DATA:     SW participated in a NICU consult this afternoon with Dr. Barbosa, [facility] care coordinator, and telephone . Pt (Jo Ann Beckman Jorge / [place] 1985) is a 33 y/o female.    INTERVENTION:     Introduced self and SW role. Chart review. SW participated in a NICU consult in [facility] clinic today, 2017. SW provided family with SW contact information for ongoing service needs.    ASSESSMENT:     Pt appeared engaged and asked appropriate questions during her NICU consult. Pt appears to be adjusting to her baby's diagnosis and anticipated medical care needs. Pt was appreciative of and receptive to SW supportive services; pt aware of availability of this SW to her and her family.    PLAN:     SW plans to continue following pt throughout her pregnancy journey to offer ongoing psychosocial support and access to appropriate resources. SW plans to continue collaborating with the multidisciplinary team.    QUIRINO Abbott, [facility]  Clinical   Maternal Child Health  [facility]  Phone:   [phone]  Pager:    [phone]

## 2017-06-01 NOTE — PROGRESS NOTES
Maternal-Fetal Medicine Prenatal Visit    Jo Ann Patel MRN# [id number]   Age: 32 year old  Estimated Date of Delivery: 2017            Gestational age: 34w4d YOB: 1985             Subjective:        Patient is without complaints.  Baby active, denies contractions, LOF or VB.            Objective:        /70 (BP Location: Left arm, Patient Position: Chair)  Pulse 75  Resp 18  Wt 66.4 kg (146 lb 6.4 oz)  LMP 10/01/2016  BMI 29.32 kg/m2       No results found for this or any previous visit (from the past 24 hour(s)).    Labs:    No results found for: ABO, RH, AS, HEPBANG, CHPCRT, GCPCRT, TREPAB, RUBELLAABIGG, HGB, HIV    GBS Status:   No results found for: GBS    No results found for: PAP         Abdomen: Gravid, Soft, Non-tender          Assessment/Plan:        32 year old y.o.  at 34w5d at first OB visit in transfer of care for delivery at [facility].  Pregnancy complicated by fetal hydrocephalus  Pt met with NICU and Neurosurgery today.  Plan MRI post-partum  Plan weekly OB visit until delivery  Plan primary CS due to macrocephaly at 39 weeks.  I anticipate the lower uterine segment should be developed enough for a LTCS and long as there is not rapid acceleration of LV size.  Will re-assess LV and HC in 2-3 weeks.  GBS next week.           Attestation:   The patient was seen for an established outpatient visit.  I spent a total of 15 minutes face to face with Jo Ann Patel during today's visit. Over 50% of this time was spent counseling the patient and/or coordinating care regarding pregnancy complicated by fetal hydrocephalus.     Mercedes Nieves MD  Maternal-Fetal Medicine

## 2017-06-01 NOTE — PROGRESS NOTES
It was a pleasure seeing Jo Ann in Neurosurgery Clinic today.  This patient is here for a prenatal consultation regarding fetal diagnosis of ventriculomegaly.  This was diagnosed on routine prenatal ultrasonography.  Her last ultrasound showed continued ventriculomegaly on 2017 and she is referred here.  She has been doing well and has no concerns.  She is due to deliver in about 6 weeks.  They are planning a  section here.      I reviewed the ultrasound findings with her from the most recent ultrasound 1 week ago.  This reveals evidence of moderate ventriculomegaly involving lateral and third ventricles with unclear dilation of the fourth ventricle.  There are no other clear imaging abnormalities.  The brain parenchyma does not have any obvious abnormalities.  I did not feel that this was severe, as noted by the radiologist, but is in more of a moderate or possibly even mild range.  We discussed the plan, which would be, following delivery, a full exam of her baby including a full neurological exam.  If there were no concerns of intracranial hypertension, the baby would undergo magnetic resonance imaging study to evaluate for aqueductal stenosis or other obstructive lesions as well as to evaluate degree of ventriculomegaly.  Based on the findings of the exam and MRI, a final decision would be made to treat or to observe.  We discussed treatment options, which could include placement of a ventriculoperitoneal shunt or endoscopic third ventriculostomy.  I discussed the details of shunting and we explained that hydrocephalus is a lifelong condition that will require frequent followup visits and, in many cases, several procedures over a lifetime.  We did not get into the details of endoscopic third ventriculostomy, but I did tell them there was a second bypass procedure, which may be an option based on the MRI findings and we can discuss that later if needed.  Yasmeen Plascencia, our nurse practitioner,  also went in to do some  shunt educational teaching with the family and to give them a shunt book in Azerbaijani.  Of note, the entire visit was done with the help of a .      All questions were answered and we look forward to treating her further.         BRIA JC MD             D: 2017 14:01   T: 2017 07:14   MT: JEF      Name:     PEPPER BYRD   MRN:      [id number]        Account:      [id number]   :      1985           Service Date: 2017      Document: [id number]

## 2017-06-01 NOTE — TELEPHONE ENCOUNTER
[facility]  MATERNAL CHILD HEALTH   SOCIAL WORK PROGRESS NOTE    DATA:     SW attempted to contact pt via telephone (contact [phone]) this afternoon, 06/01/17, with a Uzbek telephone  (ID #[id number]) to follow-up with pt after her clinic appointment yesterday and to offer support/resources.    INTERVENTION:     Attempted to contact pt via telephone this afternoon to assess for needs, offer support, assess for coping and review hospital and community resources following NICU clinic consult yesterday. Left pt a voicemail through  with SW contact information.    ASSESSMENT:     Deferred.    PLAN:     SW will continue to assess for needs and provide psychosocial support and access to resource in anticipation of pt and her baby's hospitalizations at [facility]. SW will continue to collaborate with the multidisciplinary team.    QUIRINO Abbott, [facility]  Clinical   Maternal Child Health  [facility]  Phone:   [phone]  Pager:    [phone]

## 2017-06-04 NOTE — PROGRESS NOTES
MATERNAL FETAL MEDICINE CLINIC CONSULTATION      DATE OF SERVICE:  2017      I had the pleasure of seeing Jo Ann Patel in the Maternal Fetal Medicine Clinic for  counseling at the request of Dr. Alexis Odom at [facility].  With Jo Ann and me during this visit were Maternal Fetal Medicine nurse coordinator,  social worker Tiffanie Nicholson, and a professional  via phone interpretation services.  Jo Ann is pregnant with Dina, a baby girl, and is currently at 34 weeks 4 days gestational age with an estimated due date of 2017.  I saw her today given diagnosis of fetal hydrocephalus.  Current plan is for primary  at 38-39 weeks gestational age with a tentative date scheduled of 2017.      I reviewed with Jo Ann the care plan for Dina immediately after birth, which will include a resuscitation team present.  I do not anticipate any need for respiratory or other support, but did mention this possibility with our team there to provide support as needed.  After a short visit with the family, Dina will be transported and admitted to the [place] [facility] at [facility].  She will be monitored closely from a respiratory and hemodynamic standpoint, and the pediatric neurosurgeons will be consulted shortly after birth.  Diagnostic imaging of head ultrasound will be performed as well as likely a brain MRI.  The mother met the same day with Dr. Antoni Cruz of Pediatric Neurosurgery regarding the most certain need for a shunt in the first few days after birth. The pediatric neurosurgeons will be consulted as described, and Dina will be closely followed with plan made regarding when the shunt will need to be placed.  We discussed briefly the postoperative recovery after shunt placement.      Jo Ann plans to breastfeed her baby, Dina.  We discussed initially starting  pumping breast milk shortly after delivery as Dina will be on IV nutrition for a period of time as diagnosis is made and management plans developed.  We discussed nursing and lactation support for developing a supply and storing breast milk until it is able to be given to Dina  either by breast feeding or occasionally gavage feedings.  There  has not yet been genetic testing done, and plans are being made for chromosomes and CGH testing to be sent after birth.  We discussed clinical evaluation for possible other anomalies, though these are not frequently present with isolated fetal hydrocephalus.      We had discussed vague expected length of stay depending on further diagnosis and plans for the shunt placement and mentioned it would likely be at least 1 week if not a few weeks to a month depending on her clinical course and progression.      I discussed overall makeup of the medical team and health providers in the [place] [facility] at [facility].  I described the typical structure of rounds and invited family to be present as a good time to have questions answered and give input regarding their infant's medical care.  I assured them that should they not be able to attend rounds or choose not to, there will always be a doctor or nurse practitioner who will update them in the afternoon and there is always someone available to answer questions throughout the day and night.  I discussed other aspects of the NICU welcoming policies including increased visitor restrictions given the current measles outbreak.  A tour of the NICU was offered.  Jo Ann declined on this date and wishes to have a tour visit next week when her  will be present as well.  Maternal Fetal Medicine staff will assist her in requesting a tour on the NICU with one of our nurse practitioners.  I gave her my contact information as did our  social worker should questions arise  following this clinic visit.      Total time spent was 30 minutes with 100% spent in counseling.      Thank you very much for the opportunity to meet Pepper.  We look forward to caring for Dina and working with the family after her birth in the NICU at the [facility].         Sincerely,      YESENIA MACK MD             D: 2017 12:30   T: 2017 11:54   MT: STEVE      Name:     PEPPER BYRD   MRN:      [id number]        Account:      [id number]   :      1985           Visit Date:   2017      Document: [id number]       cc: Copy for Provider        MD Arnaldo Haskins MD, MD, MD

## 2017-06-05 ENCOUNTER — HOSPITAL ENCOUNTER (OUTPATIENT)
Dept: ULTRASOUND IMAGING | Facility: CLINIC | Age: 32
Discharge: HOME | End: 2017-06-05
Attending: OBSTETRICS & GYNECOLOGY | Admitting: OBSTETRICS & GYNECOLOGY
Payer: COMMERCIAL

## 2017-06-05 ENCOUNTER — HOSPITAL ENCOUNTER (OUTPATIENT)
Dept: ULTRASOUND IMAGING | Facility: CLINIC | Age: 32
End: 2017-06-05
Attending: OBSTETRICS & GYNECOLOGY
Payer: COMMERCIAL

## 2017-06-05 ENCOUNTER — OFFICE VISIT (OUTPATIENT)
Dept: MATERNAL FETAL MEDICINE | Facility: CLINIC | Age: 32
End: 2017-06-05
Attending: OBSTETRICS & GYNECOLOGY
Payer: COMMERCIAL

## 2017-06-05 VITALS
DIASTOLIC BLOOD PRESSURE: 61 MMHG | HEART RATE: 76 BPM | RESPIRATION RATE: 20 BRPM | WEIGHT: 148.9 LBS | BODY MASS INDEX: 29.82 KG/M2 | SYSTOLIC BLOOD PRESSURE: 115 MMHG

## 2017-06-05 DIAGNOSIS — M79.89 SWELLING OF RIGHT LOWER EXTREMITY: ICD-10-CM

## 2017-06-05 DIAGNOSIS — O35.06X0 FETAL HYDROCEPHALUS AFFECTING MANAGEMENT OF MOTHER IN SINGLETON PREGNANCY: Primary | ICD-10-CM

## 2017-06-05 DIAGNOSIS — Z34.80 SUPERVISION OF OTHER NORMAL PREGNANCY, ANTEPARTUM: ICD-10-CM

## 2017-06-05 DIAGNOSIS — O35.06X0 FETAL HYDROCEPHALUS AFFECTING MANAGEMENT OF MOTHER IN SINGLETON PREGNANCY: ICD-10-CM

## 2017-06-05 PROCEDURE — 99211 OFF/OP EST MAY X REQ PHY/QHP: CPT | Mod: 25,ZF

## 2017-06-05 PROCEDURE — 76819 FETAL BIOPHYS PROFIL W/O NST: CPT

## 2017-06-05 PROCEDURE — 87653 STREP B DNA AMP PROBE: CPT | Performed by: OBSTETRICS & GYNECOLOGY

## 2017-06-05 PROCEDURE — 93971 EXTREMITY STUDY: CPT | Mod: RT

## 2017-06-05 NOTE — MR AVS SNAPSHOT
After Visit Summary   6/5/2017    Jo Ann Patel    MRN: [id number]           Patient Information     Date Of Birth          1985        Visit Information        Provider Department      6/5/2017 3:00 PM Lucero Batres,  [facility] Maternal Fetal Medicine - [place]        Today's Diagnoses     Fetal hydrocephalus affecting management of mother in thomas pregnancy    -  1    Supervision of other normal pregnancy, antepartum        Swelling of right lower extremity           Follow-ups after your visit        Your next 10 appointments already scheduled     Jun 12, 2017  1:30 PM CDT   MFM US COMPRE SINGLE F/U with URMFMUSR2   [facility] Maternal Fetal Medicine Ultrasound - [facility])    [address]  [facility] [address] [phone]           Wear comfortable clothes and leave your valuables at home.            Jun 12, 2017  2:15 PM CDT   Ob Follow Up with UR EXAM RM 2   [facility] Maternal Fetal Medicine - [place] ([facility])    [address]  [facility] [address]   [phone]            Jun 19, 2017  1:30 PM CDT   MFM BPP SINGLE with URMFMUSR3   [facility] Maternal Fetal Medicine Ultrasound - [place] ([facility])    [address]  [facility] [address] [phone]            Jun 19, 2017  2:15 PM CDT   Ob Follow Up with UR EXAM RM 2   [facility] Maternal Fetal Medicine - [place] ([facility])    [address]  [facility] [address]   [phone]            Jul 03, 2017   Procedure with Luz Maria Kwan MD   [facility] (--)    [address] [place] [address]  [place] [address]   [phone]              Future tests that were ordered for you today     Open Future Orders        Priority Expected Expires Ordered    US Lower Extremity Venous Duplex Right Routine  6/5/2018 6/5/2017            Who  "to contact     If you have questions or need follow up information about today's clinic visit or your schedule please contact [facility] MATERNAL FETAL MEDICINE [facility] directly at [phone].  Normal or non-critical lab and imaging results will be communicated to you by MyChart, letter or phone within 4 business days after the clinic has received the results. If you do not hear from us within 7 days, please contact the clinic through MyChart or phone. If you have a critical or abnormal lab result, we will notify you by phone as soon as possible.  Submit refill requests through [employer] or call your pharmacy and they will forward the refill request to us. Please allow 3 business days for your refill to be completed.          Additional Information About Your Visit        [employer] [employer] across [employer] Information     [employer]ia lets you send messages to your doctor, view your test results, renew your prescriptions, schedule appointments and more. To sign up, go to [email or web address] . Click on \"Log in\" on the left side of the screen, which will take you to the Welcome page. Then click on \"Sign up Now\" on the right side of the page.     You will be asked to enter the access code listed below, as well as some personal information. Please follow the directions to create your username and password.     Your access code is: [email or web address]  Expires: 2017  2:48 PM     Your access code will  in 90 days. If you need help or a new code, please call your [facility] clinic or [phone].        Care EveryWhere ID     This is your Care EveryWhere ID. This could be used by other organizations to access your [facility] medical records  [id number]        Your Vitals Were     Pulse Respirations Last Period BMI (Body Mass Index)          76 20 10/01/2016 29.82 kg/m2         Blood Pressure from Last 3 Encounters:   17 115/61   17 115/70   17 110/67    Weight from Last 3 Encounters:   17 67.5 kg (148 lb 14.4 oz) "   05/31/17 66.4 kg (146 lb 6.4 oz)   05/24/17 66.9 kg (147 lb 6.4 oz)              We Performed the Following     Group B strep PCR     MFM Office Visit        Primary Care Provider    [facility]       No address on file        Thank you!     Thank you for choosing [facility]AL [facility]IC[facility]  for your care. Our goal is always to provide you with excellent care. Hearing back from our patients is one way we can continue to improve our services. Please take a few minutes to complete the written survey that you may receive in the mail after your visit with us. Thank you!             Your Updated Medication List - Protect others around you: Learn how to safely use, store and throw away your medicines at www.disposemymeds.org.          This list is accurate as of: 6/5/17  4:54 PM.  Always use your most recent med list.                   Brand Name Dispense Instructions for use    pyridOXINE 25 MG tablet    vitamin B-6     Take 25 mg by mouth       NERIS DHA 27-0.6-0.4-300 MG Caps

## 2017-06-05 NOTE — PROGRESS NOTES
Maternal-Fetal Medicine Prenatal Visit    Pepper Patel MRN# [id number]   Age: 32 year old  Estimated Date of Delivery: 2017            Gestational age: 35w2d YOB: 1985             Subjective:        Feels well aside from worsening LE edema.  Right leg more swollen than left.  Worse at ankle and lower leg.  Numbness of feet and toes.  +FM.  No LOF or VB.  Intermittent, non painful contractions.           Objective:        /61  Pulse 76  Resp 20  Wt 67.5 kg (148 lb 14.4 oz)  LMP 10/01/2016  BMI 29.82 kg/m2         Results for orders placed or performed during the hospital encounter of 17 (from the past 24 hour(s))   [facility] BPP Single    Narrative            BPP  ---------------------------------------------------------------------------------------------------------  Pat. Name: PEPPER BYRD       Study Date:  2017 2:08pm  Pat. NO:  [id number]        Referring  MD: BUNNY ROTH  Site:  [facility]       Sonographer: Rissa Franklin RDMS  :  1985        Age:   32  ---------------------------------------------------------------------------------------------------------    INDICATION  ---------------------------------------------------------------------------------------------------------  Hydrocephalus .      METHOD  ---------------------------------------------------------------------------------------------------------  Transabdominal ultrasound examination.      PREGNANCY  ---------------------------------------------------------------------------------------------------------  Reyes pregnancy. Number of fetuses: 1.      DATING  ---------------------------------------------------------------------------------------------------------                                           Date                                Details                                                                                      Gest. age                      KERWIN  LMP                                   10/1/2016                                                                                                                         35 w + 2 d                     7/8/2017  External assessment          11/15/2016                       GA: 6 w + 1 d                                                                            35 w + 0 d                     7/10/2017  Assigned dating                  Dating performed on 05/24/2017, based on the LMP                                                            35 w + 2 d                     7/8/2017      GENERAL EVALUATION  ---------------------------------------------------------------------------------------------------------  Cardiac activity: present.  bpm.  Fetal movements: visualized.  Presentation: cephalic.  Placenta:  Placental site: posterior, right fundal.  Umbilical cord: previously studied.      AMNIOTIC FLUID ASSESSMENT  ---------------------------------------------------------------------------------------------------------  Amount of AF: normal  MVP 7.1 cm. PATRICK 16.7 cm. Q1 7.1 cm, Q2 4.2 cm, Q3 2.5 cm, Q4 2.8 cm      BIOPHYSICAL PROFILE  ---------------------------------------------------------------------------------------------------------  2: Fetal breathing movements  2: Gross body movements  2: Fetal tone  2: Amniotic fluid volume  8/8: Biophysical profile score      MATERNAL STRUCTURES  ---------------------------------------------------------------------------------------------------------  Cervix                                  Not examined.      RECOMMENDATION  ---------------------------------------------------------------------------------------------------------  We discussed the findings on today's ultrasound with the patient.    Continue weekly BPPs.    See Epic notes for further information of today's ob visit.    Thank you for the opportunity to participate in the care of this patient. If you have questions regarding  today's evaluation or if we can be of further service, please contact the  Maternal-Fetal Medicine Center.    **Fetal anomalies may be present but not detected**.        Impression    IMPRESSION  ---------------------------------------------------------------------------------------------------------  1) Intrauterine pregnancy at 35 2/7 weeks gestational age.  2) The BPP is reassuring.  3) The amniotic fluid volume appeared normal.           Labs:   See transfer of care record for details.     GBS Status: pending- collected today.           Abdomen: Gravid, Soft, Non-tender       Ext: RLE 2-3+ doughy, pitting edema of ankle and pretib area.  LLE 2+ edema          Assessment:        32 year old y.o.  at 35w2d        1) Fetal hydrocephalus (lateral/third ventricles)       2) Fetal macrocephaly       3) Worsening RLE edema          Plan:    1) Continue weekly appts and BPPs  2)  Primary c/s scheduled at 39 weeks due to macrocephaly  3) s/p NICU/Peds Neurosurg consult  4) RLE Doppler today to rule out VTE.  Discussed use of compression stockings, elevation of LE.               Attestation:   The patient was seen for an established outpatient visit.  I spent a total of 15 minutes face to face with Jo Ann Patel during today's visit. Over 50% of this time was spent counseling the patient and/or coordinating care regarding fetal status, RLE swelling, prenatal care.     Lucero Batres DO,  Maternal-Fetal Medicine   2017

## 2017-06-05 NOTE — NURSING NOTE
F:F 15 mins  Jo Ann, partner, and  seen in clinic today for BPP/OBV at 35.2 weeks gestation for fetal hydrocephalus (see report/notes). VSS. Pt denies bldg/lof/change in discharge/contractions/headache/vision changes/chest pain/SOB. 2+-3+ edema in LE, R>L. Encouraged increased water, elevation of LE's, support stockings. Discussed daily fetal kick counts, s/s of ptl/pre-e.  PHQ 9 done. Dr. Batres met with pt and discussed POC. GBS collected, cervical exam deferred. Doppler of RLE ordered- able to be done this afternoon. Plan to continue weekly BPP, serial growth, weekly OBV. Pt escorted to NICU for tour, instructed to report to Adult Imaging for right LE doppler. Any abnormal results will be called to Dr Batres.    EDILBERTO SHABAZZ RN

## 2017-06-06 LAB
GP B STREP DNA SPEC QL NAA+PROBE: NORMAL
SPECIMEN SOURCE: NORMAL

## 2017-06-12 ENCOUNTER — HOSPITAL ENCOUNTER (OUTPATIENT)
Dept: ULTRASOUND IMAGING | Facility: CLINIC | Age: 32
Discharge: HOME | End: 2017-06-12
Attending: OBSTETRICS & GYNECOLOGY | Admitting: OBSTETRICS & GYNECOLOGY
Payer: COMMERCIAL

## 2017-06-12 ENCOUNTER — OFFICE VISIT (OUTPATIENT)
Dept: MATERNAL FETAL MEDICINE | Facility: CLINIC | Age: 32
End: 2017-06-12
Attending: OBSTETRICS & GYNECOLOGY
Payer: COMMERCIAL

## 2017-06-12 VITALS — BODY MASS INDEX: 30.24 KG/M2 | WEIGHT: 151 LBS

## 2017-06-12 DIAGNOSIS — O35.06X0 FETAL HYDROCEPHALUS AFFECTING MANAGEMENT OF MOTHER IN SINGLETON PREGNANCY: ICD-10-CM

## 2017-06-12 DIAGNOSIS — O35.06X0 FETAL HYDROCEPHALUS AFFECTING MANAGEMENT OF MOTHER IN SINGLETON PREGNANCY: Primary | ICD-10-CM

## 2017-06-12 DIAGNOSIS — Z34.80 SUPERVISION OF OTHER NORMAL PREGNANCY, ANTEPARTUM: ICD-10-CM

## 2017-06-12 PROCEDURE — 99211 OFF/OP EST MAY X REQ PHY/QHP: CPT

## 2017-06-12 PROCEDURE — 76816 OB US FOLLOW-UP PER FETUS: CPT

## 2017-06-12 PROCEDURE — 76819 FETAL BIOPHYS PROFIL W/O NST: CPT | Performed by: OBSTETRICS & GYNECOLOGY

## 2017-06-12 NOTE — MR AVS SNAPSHOT
After Visit Summary   6/12/2017    Jo Ann Patel    MRN: [id number]           Patient Information     Date Of Birth          1985        Visit Information        Provider Department      6/12/2017 2:15 PM Lucero Batres,  [facility] Maternal Fetal Medicine [facility]        Today's Diagnoses     Fetal hydrocephalus affecting management of mother in thomas pregnancy    -  1    Supervision of other normal pregnancy, antepartum           Follow-ups after your visit        Your next 10 appointments already scheduled     Jun 19, 2017  1:30 PM CDT   MFM BPP SINGLE with URMFMUSR3   [facility] Maternal Fetal Medicine Ultrasound - [place] ([facility])    [address]  [facility] [id number] [phone]            Jun 19, 2017  2:15 PM CDT   Ob Follow Up with UR EXAM RM 2   [facility] Maternal Fetal Medicine - [place] ([facility])    [address]  [facility] [address] [phone]            Jul 03, 2017   Procedure with Luz Maria Kwan MD   [facility] (--)    [address] [place] [address]  [facility] [id number] [phone]              Who to contact     If you have questions or need follow up information about today's clinic visit or your schedule please contact [facility] MATERNAL FETAL MEDICINE [facility] directly at [phone].  Normal or non-critical lab and imaging results will be communicated to you by MyChart, letter or phone within 4 business days after the clinic has received the results. If you do not hear from us within 7 days, please contact the clinic through MyChart or phone. If you have a critical or abnormal lab result, we will notify you by phone as soon as possible.  Submit refill requests through [employer] or call your pharmacy and they will forward the refill request to us. Please allow 3 business days for your refill to be completed.          Additional Information About Your  "Visit        MyChart Information     [employer] lets you send messages to your doctor, view your test results, renew your prescriptions, schedule appointments and more. To sign up, go to [email or web address] . Click on \"Log in\" on the left side of the screen, which will take you to the Welcome page. Then click on \"Sign up Now\" on the right side of the page.     You will be asked to enter the access code listed below, as well as some personal information. Please follow the directions to create your username and password.     Your access code is: [email or web address]  Expires: 2017  2:48 PM     Your access code will  in 90 days. If you need help or a new code, please call your [facility] clinic or [phone].        Care EveryWhere ID     This is your Care EveryWhere ID. This could be used by other organizations to access your [facility] medical records  [id number]        Your Vitals Were     Last Period BMI (Body Mass Index)                10/01/2016 30.24 kg/m2           Blood Pressure from Last 3 Encounters:   17 115/61   17 115/70   17 110/67    Weight from Last 3 Encounters:   17 68.5 kg (151 lb)   17 67.5 kg (148 lb 14.4 oz)   17 66.4 kg (146 lb 6.4 oz)              We Performed the Following     MFM Office Visit        Primary Care Provider    [facility]       No address on file        Thank you!     Thank you for choosing [facility]AL [facility]IC[facility]  for your care. Our goal is always to provide you with excellent care. Hearing back from our patients is one way we can continue to improve our services. Please take a few minutes to complete the written survey that you may receive in the mail after your visit with us. Thank you!             Your Updated Medication List - Protect others around you: Learn how to safely use, store and throw away your medicines at www.disposemymeds.org.          This list is accurate as of: 17 11:59 " PM.  Always use your most recent med list.                   Brand Name Dispense Instructions for use    pyridOXINE 25 MG tablet    vitamin B-6     Take 25 mg by mouth       NERIS DHA 27-0.6-0.4-300 MG Caps

## 2017-06-17 NOTE — PROGRESS NOTES
Maternal-Fetal Medicine Prenatal Visit    Jo Ann Patel MRN# [id number]   Age: 32 year old  Estimated Date of Delivery: 2017            Gestational age: 36w2d YOB: 1985             Subjective:        Overall feeling well.  Visit with  present.  Still swelling in bilat lower extremities with numbness in toes.  No calf pain.  Denies CP or SOB.  +FM.  No LOF or VB.  Intermittent non painful contractions.           Objective:        Wt 68.5 kg (151 lb)  LMP 10/01/2016  BMI 30.24 kg/m2       No results found for this or any previous visit (from the past 24 hour(s)).    Labs:   See scanned records    GBS Status:   Lab Results   Component Value Date    GBS  2017     Negative  No GBS DNA detected, presumed negative for GBS or number of bacteria may be   below the limit of detection of the assay.   Assay performed on incubated broth culture of specimen using Network real-time   PCR.   Assay performed on incubated broth culture of specimen using [employer] real-time   PCR.                 Gen: NAD, alert, comfortable        Lung: non labored       Abdomen: Gravid, Soft, Non-tender       Ext: 2+ pedal edema, especially medial malleolus area, no erythema          Assessment:        32 year old y.o.  at 36w2d        1) Suspected fetal aqueductal stenosis with hydrocephalus       2) Macrocephaly necessitating delivery by primary c/s       3) c/s scheduled for 39 weeks          Plan:        Weekly BPPs and ob visits       Continue wearing compression stockings       Call immediately with any SOB/CP.  Patient had negative LE Doppler Right side          Attestation:   The patient was seen for an established outpatient visit.  I spent a total of 15 minutes face to face with Jo Ann Patel during today's visit. Over 50% of this time was spent counseling the patient and/or coordinating care regarding  management.     Lucero Batres,   Maternal-Fetal  Medicine   June 16, 2017

## 2017-06-19 ENCOUNTER — HOSPITAL ENCOUNTER (OUTPATIENT)
Dept: ULTRASOUND IMAGING | Facility: CLINIC | Age: 32
Discharge: HOME | End: 2017-06-19
Attending: OBSTETRICS & GYNECOLOGY | Admitting: OBSTETRICS & GYNECOLOGY
Payer: COMMERCIAL

## 2017-06-19 ENCOUNTER — OFFICE VISIT (OUTPATIENT)
Dept: MATERNAL FETAL MEDICINE | Facility: CLINIC | Age: 32
End: 2017-06-19
Attending: OBSTETRICS & GYNECOLOGY
Payer: COMMERCIAL

## 2017-06-19 VITALS
SYSTOLIC BLOOD PRESSURE: 126 MMHG | BODY MASS INDEX: 30.4 KG/M2 | WEIGHT: 151.8 LBS | TEMPERATURE: 98 F | HEART RATE: 77 BPM | DIASTOLIC BLOOD PRESSURE: 78 MMHG

## 2017-06-19 DIAGNOSIS — Z34.80 SUPERVISION OF OTHER NORMAL PREGNANCY, ANTEPARTUM: ICD-10-CM

## 2017-06-19 DIAGNOSIS — O35.06X0 FETAL HYDROCEPHALUS AFFECTING MANAGEMENT OF MOTHER IN SINGLETON PREGNANCY: ICD-10-CM

## 2017-06-19 DIAGNOSIS — O35.06X0 FETAL HYDROCEPHALUS AFFECTING MANAGEMENT OF MOTHER IN SINGLETON PREGNANCY: Primary | ICD-10-CM

## 2017-06-19 PROCEDURE — 99211 OFF/OP EST MAY X REQ PHY/QHP: CPT | Mod: 25,ZF

## 2017-06-19 PROCEDURE — 76819 FETAL BIOPHYS PROFIL W/O NST: CPT

## 2017-06-19 NOTE — NURSING NOTE
F:F 15mins   Jo Ann, her partner, and the  seen in clinic today for BPP/OBV at 37.2 weeks gestation for fetal ventriculomegaly (see report/notes). VSS. Pt denies bldg/lof/change in discharge/contractions/headache/vision changes/chest pain/SOB. Continues to have 2+ edema in LE's. +FM. Jo Ann states she has had a cold and sore throat for 1.5 days, is afebrile. Encouraged patient to call if she has temperature or cold/sore throat don't resolve in next few days.  Dr. Batres met with pt and discussed POC. Plan to return in 1 week for BPP/OBV. C/S scheduled 7/3/17- plan to give her directions and soap next week. Parking pass information given to family again. Pt discharged stable and ambulatory. EDILBERTO SHABAZZ RN

## 2017-06-19 NOTE — MR AVS SNAPSHOT
After Visit Summary   6/19/2017    Jo Ann Patel    MRN: [id number]           Patient Information     Date Of Birth          1985        Visit Information        Provider Department      6/19/2017 2:15 PM Lucero Batres,  [facility] Maternal Fetal Medicine [facility]        Today's Diagnoses     Fetal hydrocephalus affecting management of mother in thomas pregnancy    -  1    Supervision of other normal pregnancy, antepartum           Follow-ups after your visit        Additional Services     MFM Office Visit  (Weekly)      Weekly OBV                  Your next 10 appointments already scheduled     Jun 26, 2017  8:45 AM CDT   MFM BPP SINGLE with URMFMUSR3   [facility] Maternal Fetal Medicine Ultrasound - [place] ([facility])    [address]  [place] [address] [phone]            Jun 26, 2017  9:30 AM CDT   Ob Follow Up with UR EXAM RM 2   [facility] Maternal Fetal Medicine [facility] ([facility])    [address]  [place] [address]   [phone]            Jul 03, 2017   Procedure with Luz Maria Kwan MD   [facility] (--)    [address] [place] [address]  [place] [address]   [phone]              Future tests that were ordered for you today     Open Standing Orders        Priority Remaining Interval Expires Ordered    MFM Office Visit Routine 1/1 Weekly 6/19/2018 6/19/2017          Open Future Orders        Priority Expected Expires Ordered    MFM BPP Single Routine  4/19/2018 6/19/2017            Who to contact     If you have questions or need follow up information about today's clinic visit or your schedule please contact [facility] directly at [phone].  Normal or non-critical lab and imaging results will be communicated to you by MyChart, letter or phone within 4 business days after the clinic has received the results. If you do not  "hear from us within 7 days, please contact the clinic through [employer] or phone. If you have a critical or abnormal lab result, we will notify you by phone as soon as possible.  Submit refill requests through [employer] or call your pharmacy and they will forward the refill request to us. Please allow 3 business days for your refill to be completed.          Additional Information About Your Visit        [employer] Information     [employer] lets you send messages to your doctor, view your test results, renew your prescriptions, schedule appointments and more. To sign up, go to [email or web address] . Click on \"Log in\" on the left side of the screen, which will take you to the Welcome page. Then click on \"Sign up Now\" on the right side of the page.     You will be asked to enter the access code listed below, as well as some personal information. Please follow the directions to create your username and password.     Your access code is: [email or web address]  Expires: 2017  9:52 AM     Your access code will  in 90 days. If you need help or a new code, please call your [facility] clinic or [phone].        Care EveryWhere ID     This is your Care EveryWhere ID. This could be used by other organizations to access your [facility] medical records  [id number]        Your Vitals Were     Pulse Temperature Last Period BMI (Body Mass Index)          77 98  F (36.7  C) (Oral) 10/01/2016 30.4 kg/m2         Blood Pressure from Last 3 Encounters:   17 126/78   17 115/61   17 115/70    Weight from Last 3 Encounters:   17 68.9 kg (151 lb 12.8 oz)   17 68.5 kg (151 lb)   17 67.5 kg (148 lb 14.4 oz)              We Performed the Following     MFM Office Visit        Primary Care Provider    [facility]       No address on file        Thank you!     Thank you for choosing [facility]  for your care. Our goal is always to provide you with excellent " care. Hearing back from our patients is one way we can continue to improve our services. Please take a few minutes to complete the written survey that you may receive in the mail after your visit with us. Thank you!             Your Updated Medication List - Protect others around you: Learn how to safely use, store and throw away your medicines at www.disposemymeds.org.          This list is accurate as of: 6/19/17 11:59 PM.  Always use your most recent med list.                   Brand Name Dispense Instructions for use    pyridOXINE 25 MG tablet    vitamin B-6     Take 25 mg by mouth       ALEXANDRA-RAGHAVENDRA DHA 27-0.6-0.4-300 MG Caps

## 2017-06-20 NOTE — PROGRESS NOTES
Your results are normal.  Please followup as was discussed in the office or call with questions.  Mely Salas MD

## 2017-06-20 NOTE — PROGRESS NOTES
Pt notified of below.  Pt reports understanding.  Pt does not have further questions or concerns.    Amanda Ibrahim   Ob[facility]Gyn Clinic  RN

## 2017-06-20 NOTE — PROGRESS NOTES
Maternal-Fetal Medicine Prenatal Visit    Jo Ann Patel MRN# [id number]   Age: 32 year old  Estimated Date of Delivery: 2017            Gestational age: 37w3d YOB: 1985             Subjective:      Patient still complaining of lower extremity/pedal edema with toe swelling and numbness.  Patient has tried compression stockings, but has more pain when wearing them.  Some minor cold symptoms/sore throat the past day.  No fever/chills.  Denies SOB or CP.  +FM.  No LOF or VB.  Intermittent cramping, but not regular.           Objective:        /78 (BP Location: Left arm)  Pulse 77  Temp 98  F (36.7  C) (Oral)  Wt 68.9 kg (151 lb 12.8 oz)  LMP 10/01/2016  BMI 30.4 kg/m2         Results for orders placed or performed during the hospital encounter of 17 (from the past 24 hour(s))   [facility] BP Single    Narrative            BPP  ---------------------------------------------------------------------------------------------------------  Pat. Name: JO ANN BYRD       Study Date:  2017 1:26pm  Pat. NO:  [id number]        Referring  MD: KWESI WEBB  Site:  [facility]       Sonographer: Trey Rico RDMS  :  1985        Age:   32  ---------------------------------------------------------------------------------------------------------    INDICATION  ---------------------------------------------------------------------------------------------------------  Hydrocephaly.      METHOD  ---------------------------------------------------------------------------------------------------------  Transabdominal ultrasound examination. View: Good view.      PREGNANCY  ---------------------------------------------------------------------------------------------------------  Reyes pregnancy. Number of fetuses: 1.      DATING  ---------------------------------------------------------------------------------------------------------                                            Date                                Details                                                                                      Gest. age                      KERWIN  LMP                                  10/1/2016                                                                                                                         37 w + 2 d                     7/8/2017  External assessment          11/15/2016                       GA: 6 w + 1 d                                                                            37 w + 0 d                     7/10/2017  Assigned dating                  Dating performed on 06/12/2017, based on the LMP                                                            37 w + 2 d                     7/8/2017      GENERAL EVALUATION  ---------------------------------------------------------------------------------------------------------  Cardiac activity: present.  bpm.  Fetal movements: visualized.  Presentation: cephalic.  Placenta: posterior.  Umbilical cord: previously studied.      AMNIOTIC FLUID ASSESSMENT  ---------------------------------------------------------------------------------------------------------  Amount of AF: normal  MVP 4.3 cm. PATRICK 10.9 cm. Q1 4.3 cm, Q2 3.0 cm, Q3 3.7 cm, Q4 0.0 cm      BIOPHYSICAL PROFILE  ---------------------------------------------------------------------------------------------------------  2: Fetal breathing movements  2: Gross body movements  2: Fetal tone  2: Amniotic fluid volume  8/8: Biophysical profile score      RECOMMENDATION  ---------------------------------------------------------------------------------------------------------  We discussed the findings on today's ultrasound with the patient.    Continue weekly BPPs.    See epic notes for further details of today's visit.    Return to primary provider for continued prenatal care.    Thank you for the opportunity to participate in the care of this patient. If you have  questions regarding today's evaluation or if we can be of further service, please contact the  M[facility]etal Medicine Center.    **Fetal anomalies may be present but not detected**.        Impression    IMPRESSION  ---------------------------------------------------------------------------------------------------------  1) Intrauterine pregnancy at 37 2/7 weeks gestational age with the fetus in the Cephalic presentation.  2) The BPP is reassuring.  3) The amniotic fluid volume appeared normal.           Labs:    No results found for: ABO, RH, AS, HEPBANG, CHPCRT, GCPCRT, TREPAB, RUBELLAABIGG, HGB, HIV    GBS Status:   Lab Results   Component Value Date    GBS  2017     Negative  No GBS DNA detected, presumed negative for GBS or number of bacteria may be   below the limit of detection of the assay.   Assay performed on incubated broth culture of specimen using [employer] real-time   PCR.   Assay performed on incubated broth culture of specimen using [employer] real-time   PCR.             Gen: NAD, alert, comfortable      Resp: non labored       Abdomen: Gravid, Soft, Non-tender       Ext: 2-3+ pedal edema and pretibial edema.            Assessment:        32 year old y.o.  at 37w3d        1) Fetal hydrocephalus probably due to aqueductal stenosis.         2) C/S scheduled due to macrocephaly at 39 weeks on 7/3/17.       3) Cord blood collection for karyotype and CGH.            Plan:        Continue weekly BPPs, visits.  Reviewed kick counts and labor warnings.           Attestation:   The patient was seen for an established outpatient visit.  I spent a total of 15 minutes face to face with Jo Ann Patel during today's visit. Over 50% of this time was spent counseling the patient and/or coordinating care regarding  management plan.     Lucero Batres, DO  Maternal-Fetal Medicine   2017

## 2017-06-23 ENCOUNTER — HOSPITAL ENCOUNTER (OUTPATIENT)
Facility: CLINIC | Age: 32
Discharge: HOME | End: 2017-06-23
Attending: OBSTETRICS & GYNECOLOGY | Admitting: OBSTETRICS & GYNECOLOGY
Payer: COMMERCIAL

## 2017-06-23 ENCOUNTER — TELEPHONE (OUTPATIENT)
Dept: MATERNAL FETAL MEDICINE | Facility: CLINIC | Age: 32
End: 2017-06-23

## 2017-06-23 VITALS — TEMPERATURE: 98.3 F | SYSTOLIC BLOOD PRESSURE: 134 MMHG | RESPIRATION RATE: 18 BRPM | DIASTOLIC BLOOD PRESSURE: 80 MMHG

## 2017-06-23 DIAGNOSIS — Z34.80 SUPERVISION OF OTHER NORMAL PREGNANCY, ANTEPARTUM: ICD-10-CM

## 2017-06-23 PROBLEM — Z36.89 ENCOUNTER FOR TRIAGE IN PREGNANT PATIENT: Status: ACTIVE | Noted: 2017-06-23

## 2017-06-23 PROCEDURE — 99213 OFFICE O/P EST LOW 20 MIN: CPT

## 2017-06-23 NOTE — IP AVS SNAPSHOT
[facility]    [address]    [place] [address]    Phone:  [phone]                                       After Visit Summary   6/23/2017    Jo Ann Patel    MRN: [id number]           After Visit Summary Signature Page     I have received my discharge instructions, and my questions have been answered. I have discussed any challenges I see with this plan with the nurse or doctor.    ..........................................................................................................................................  Patient/Patient Representative Signature      ..........................................................................................................................................  Patient Representative Print Name and Relationship to Patient    ..................................................               ................................................  Date                                            Time    ..........................................................................................................................................  Reviewed by Signature/Title    ...................................................              ..............................................  Date                                                            Time

## 2017-06-23 NOTE — TELEPHONE ENCOUNTER
"Jo Ann called,  used, c/o \"mildly\" painful contractions approx. 4x/hour for last several hours. She denies bldg/ROM, +FM. Jo Ann advised to come to L & D for labor eval. Jo Ann agreeable with plan. L & D, Resident, and service MD notified.   "

## 2017-06-23 NOTE — IP AVS SNAPSHOT
MRN:[id number]                      After Visit Summary   6/23/2017    Jo Ann Patel    MRN: [id number]           Thank you!     Thank you for choosing [place] for your care. Our goal is always to provide you with excellent care. Hearing back from our patients is one way we can continue to improve our services. Please take a few minutes to complete the written survey that you may receive in the mail after you visit with us. Thank you!        Patient Information     Date Of Birth          1985        Designated Caregiver       Most Recent Value    Caregiver    Will someone help with your care after discharge? no      About your hospital stay     You were admitted on:  June 23, 2017 You last received care in the:  [facility]    You were discharged on:  June 23, 2017        Reason for your hospital stay       To rule out labor                  Who to Call     For medical emergencies, please call 911.  For non-urgent questions about your medical care, please call your primary care provider or clinic, None          Attending Provider     Provider Nicolasa Epps MD Obstetrics & Gynecology, Maternal & Fetal Medicine       Primary Care Provider    [facility]       When to contact your care team       - Worsening of contractions  - Vaginal bleeding  - Leaking of fluid  - Decreased fetal movement                  Follow-up Appointments     Follow Up and recommended labs and tests       Follow-up as scheduled on 6/26 with your OB provider.                  Your next 10 appointments already scheduled     Jun 26, 2017  8:45 AM CDT   MFM BPP SINGLE with URMFMUSR3   [facility] Maternal Fetal Medicine Ultrasound - [place] ([facility])    [address]  [facility] [address]   [phone]            Jun 26, 2017  9:30 AM CDT   Ob Follow Up with UR EXAM RM 2   [facility] Maternal [facility]etal [facility] - [place]  ([facility])    [address]  [facility] [place] [address]   [phone]            Jul 03, 2017   Procedure with Luz Maria Kwan MD   [facility] (--)    [address]  [facility] [place] [address] [phone]              Further instructions from your care team       Discharge Instruction for Undelivered Patients      You were seen for: Labor Assessment  We Consulted: Dr. Ayala, Dr. Mcgraw  You had (Test or Medicine):NST     Diet:   Drink 8 to 12 glasses of liquids (milk, juice, water) every day.  You may eat meals and snacks.  If contractions worsen or you have leaking of fluid, stop eating or drinking and come to the hospital.      Activity:  Count fetal kicks everyday (see handout)  Call your doctor or nurse midwife if your baby is moving less than usual.     Call your provider if you notice:  Swelling in your face or increased swelling in your hands or legs.  Headaches that are not relieved by Tylenol (acetaminophen).  Changes in your vision (blurring: seeing spots or stars.)  Nausea (sick to your stomach) and vomiting (throwing up).   Weight gain of 5 pounds or more per week.  Heartburn that doesn't go away.  Signs of bladder infection: pain when you urinate (use the toilet), need to go more often and more urgently.  The bag of terrell (rupture of membranes) breaks, or you notice leaking in your underwear.  Bright red blood in your underwear.  Abdominal (lower belly) or stomach pain.  Contractions (tightening) less than 5 minutes apart for one hour or more.  Increase or change in vaginal discharge (note the color and amount)  Anything that concerns you.    Follow-up:  As scheduled in the clinic          Pending Results     No orders found from 6/21/2017 to 6/24/2017.            Admission Information     Date & Time Provider Department Dept. Phone    6/23/2017 Nicolasa Ayala MD [facility] [phone]      Your Vitals Were     Blood Pressure Temperature Respirations Last  "Period          134/80 98.3  F (36.8  C) 18 10/01/2016        [employer] Information     [employer] lets you send messages to your doctor, view your test results, renew your prescriptions, schedule appointments and more. To sign up, go to [email or web address] . Click on \"Log in\" on the left side of the screen, which will take you to the Welcome page. Then click on \"Sign up Now\" on the right side of the page.     You will be asked to enter the access code listed below, as well as some personal information. Please follow the directions to create your username and password.     Your access code is: [email or web address]  Expires: 2017  9:52 AM     Your access code will  in 90 days. If you need help or a new code, please call your [facility] clinic or [phone].        Care EveryWhere ID     This is your Care EveryWhere ID. This could be used by other organizations to access your [facility] medical records  [id number]        Equal Access to Services     MICHAEL GORDON : Hadii delvin gomez Somindy, waaxda luqadaha, qaybta kaalmaalicia aderaegan, asim unger . So [facility] [phone].    ATENCIÓN: Si habla español, tiene a schafer disposición servicios gratuitos de asistencia lingüística. Llame al [phone].    We comply with applicable federal civil rights laws and [place] laws. We do not discriminate on the basis of race, color, national origin, age, disability sex, sexual orientation or gender identity.               Review of your medicines      CONTINUE these medicines which have NOT CHANGED        Dose / Directions    pyridOXINE 25 MG tablet   Commonly known as:  vitamin B-6        Dose:  25 mg   Take 25 mg by mouth   Refills:  0       ZATEAN-PN DHA 27-0.6-0.4-300 MG Caps        Refills:  0                Protect others around you: Learn how to safely use, store and throw away your medicines at www.disposemymeds.org.             Medication List: This is a list of all your medications and when " to take them. Check marks below indicate your daily home schedule. Keep this list as a reference.      Medications           Morning Afternoon Evening Bedtime As Needed    pyridOXINE 25 MG tablet   Commonly known as:  vitamin B-6   Take 25 mg by mouth                                ALEXANDRA-PN DHA 27-0.6-0.4-300 MG Caps                                          More Information        Recuento de patadas  Es normal que le preocupe la jeannette de schafer bebé. Para saber si el bebé está tyler, usted puede anotar las veces que usted siente astrid pataditas en un registro de movimientos todos los días. Normalmente es posible sentir que el bebé se mueve a partir de la semana 20 del embarazo. No olvide llevar los registros de los movimientos del bebé a todas las citas que tenga con schafer proveedor de atención médica.     Cómo contar los movimientos    Escoja ange hora cuando el bebé esté activo, andie por ejemplo después de ange comida.    Siéntese cómodamente o acuéstese de lado.    La primera vez que el bebé se mueva, anote la hora.    Cuente cada movimiento hasta que el bebé se haya movido 10 veces. (Cuthbert puede llevar entre 20 minutos y 2 horas.)    Si el bebé no se ha movido 4 veces en 1 hora, dése ange palmadita en el abdomen para despertarlo.    Anote la hora en que sienta el décimo movimiento del bebé.    Trate de hacer esto a la misma hora todos los días.  Cuándo debe llamar al proveedor de atención médica  Llame a schafer proveedor de atención médica en el acto en cualquiera de los siguientes casos:     Si el bebé se mueve menos de 10 veces en 1 horas mientras usted está llevando la cuenta de las pataditas.    Si el bebé se mueve con mucha menos frecuencia que en días anteriores.    Si usted no ha sentido movimientos del bebé en todo el día.    7400-1257 Ming Gipson, [address] [place] [address], [place] [address]. Todos los derechos reservados. Esta información no pretende sustituir la atención médica profesional. Sólo schafer médico puede  diagnosticar y tratar un problema de jeannette.            Cómo reconocer los síntomas del parto    El comienzo del trabajo de parto es el inicio del alumbramiento. Empezará a sentir contracciones silvia. Es decir, los músculos del útero se tensarán para ayudar a empujar al bebé hacia afuera aleksander el parto.  Sí, es probable que haya empezado el trabajo de parto si:    Las contracciones son cada vez más silvia y más dolorosas, y no más débiles. Jean vez las sienta en todo el útero.    Las contracciones son regulares (las siente más o menos cada 5 a 10 minutos), y la frecuencia entre ellas es cada vez marvin.    Le sale de la vagina un líquido cortez o sanguinolento.    Se le rompe la gabrielle. Puede ser ange pérdida repentina y abundante, o lenta y nan de un líquido emi de la vagina.  No, probablemente no sea el trabajo de parto si:    Las contracciones no son regulares ni silvia.    Siente las contracciones solo en la parte baja del útero.    Las contracciones desaparecen cuando camina o cambia de posición.    Las contracciones desaparecen después de lexie líquidos.  Cuándo llamar a schafer proveedor de atención médica  Llame inmediatamente al médico o la clínica si nota algo de lo siguiente:    Pierde líquido de la vagina, con o sin contracciones.    Sangrado abundante andie para necesitar ange toalla sanitaria.    No siente que el bebé se mueve tanto andie antes.  NOTA: las contracciones se miden de dos maneras:    La duración de cada contracción desde que empieza hasta que termina.    Cuánto tiempo pasa entre ange contraccióny otra, es decir el tiempo entre el comienzo de ange contracción y el comienzo de la siguiente.   Date Last Reviewed: 8/9/2015 2000-2017 [employer]. [address] [place] [address], [place] [address]. Todos los derechos reservados. Esta información no pretende sustituir la atención médica profesional. Sólo schafer médico puede diagnosticar y tratar un problema de jeannette.

## 2017-06-24 NOTE — PROGRESS NOTES
Obstetrics Triage Note    HPI:  Jo Ann Patel is a 32 year old  female at 37w6d by LMP c/w 6w1d US, presenting with contractions for R/o Labor.  Report contractions starting this morning increasing in intensity and frequency since then.  She denies any leakage of fluid, vaginal bleeding. Reports active fetal movement. She states that she has otherwise been feeling well. She denies fever, N/V, chest pain, SOB, vaginal discharge, dysuria, constipation.    Pregnancy Notable for:   - Fetal hydrocephalus, BPD  108.9 mm. Plan is for primary C/S if labors, otherwise scheduled for 7/3.     ROS:  Negative except as mentioned in HPI.    PMH:  Past Medical History:   Diagnosis Date     NO ACTIVE PROBLEMS      Wounds and injuries     Fell @ 19 wks, went to [facility], stable     PSHx:  Past Surgical History:   Procedure Laterality Date     APPENDECTOMY         Medications:  No current facility-administered medications for this encounter.        Allergies:   No Known Allergies    Physical Exam:   Vitals:    17 1906   BP: 134/80   Resp: 18   Temp: 98.3  F (36.8  C)      Gen: resting comfortably, in NAD  CV: RRR, no m/r/g  Pulm: CTAB, no increased work of breathing  Abd: soft, gravid, non-tender, non-distended  Cx: 0.5/thick/high    NST:  FHT: 135 bpm, moderate variability, accels present, no decels  [place]: Q 7 min    Assessment/Plan: Jo Ann Patel is a 32 year old female  at 37w6d by LMP c/w 6w1d ultrasound, here for R/O labor.    R/O Labor: Patient with irregular contractions, appears only slightly uncomfortable with contractions  - cervical exam unchanged over 2 checks.  - Discussed patient resting at home and returning for worsening contractions, vaginal bleeding, leaking of fluid or decreased fetal movement  - Discussed with patient that if she does experience worsening contractions to not eat or drink anything and return ASAP.    FWB: Category I FHR    - Dispo: Home with labor  precautions.  Has appointment 6/26/17. Plan for C/S 7/3.    Carley Mcgraw MD  OBGYN PGY-3  8:46 PM 6/23/2017    MFM Attending Addendum    I discussued the care of Ms. Rk Patel with the resident providing the services during the visit.      Patient followed by MFM for:  Fetal anomaly    /80  Temp 98.3  F (36.8  C)  Resp 18  LMP 10/01/2016  EFM reviewed - category I    I was directly responsible for the patient's management.  The services provided were appropriate.  I was available to the patient had the need arisen.  Please see note for further details.        Nicolasa Ayala MD  , OB/GYN  Maternal-Fetal Medicine  [email or web address]  [phone] (Academic office)  [phone] (Pager)

## 2017-06-24 NOTE — DISCHARGE INSTRUCTIONS
Discharge Instruction for Undelivered Patients      You were seen for: Labor Assessment  We Consulted: Dr. Ayala, Dr. Mcgraw  You had (Test or Medicine):NST     Diet:   Drink 8 to 12 glasses of liquids (milk, juice, water) every day.  You may eat meals and snacks.  If contractions worsen or you have leaking of fluid, stop eating or drinking and come to the hospital.      Activity:  Count fetal kicks everyday (see handout)  Call your doctor or nurse midwife if your baby is moving less than usual.     Call your provider if you notice:  Swelling in your face or increased swelling in your hands or legs.  Headaches that are not relieved by Tylenol (acetaminophen).  Changes in your vision (blurring: seeing spots or stars.)  Nausea (sick to your stomach) and vomiting (throwing up).   Weight gain of 5 pounds or more per week.  Heartburn that doesn't go away.  Signs of bladder infection: pain when you urinate (use the toilet), need to go more often and more urgently.  The bag of terrell (rupture of membranes) breaks, or you notice leaking in your underwear.  Bright red blood in your underwear.  Abdominal (lower belly) or stomach pain.  Contractions (tightening) less than 5 minutes apart for one hour or more.  Increase or change in vaginal discharge (note the color and amount)  Anything that concerns you.    Follow-up:  As scheduled in the clinic

## 2017-06-24 NOTE — PLAN OF CARE
"[facility] @ 1900 with c/o \"tightening\". Ctx occasionally on monitor, mild on palpation. SVE done, 0.5 cm dilated, long and thick. FHR cat 1 reactive. No bldg, no LOF. Pt would be c/s if laboring c/s severe hydrocephaly. Pt last ate @ 1830 and drank @ 1900, NPO now. Plan to reevaluate 2 hrs after last SVE and determine plan. Continue to monitor.  "

## 2017-06-24 NOTE — PLAN OF CARE
Data: [facility] at 1855.   Reason for maternal/fetal assessment per patient is Rule Out Labor  . Patient is a . Prenatal record reviewed.      Obstetric History       T1      L1     SAB0   TAB0   Ectopic0   Multiple0   Live Births0       # Outcome Date GA Lbr [place]/2nd Weight Sex Delivery Anes PTL Lv   2 Current            1 Term 08 40w0d  2.722 kg (6 lb) F    SUSHILA         Medical History:   Past Medical History:   Diagnosis Date     NO ACTIVE PROBLEMS      Wounds and injuries     Fell @ 19 wks, went to [facility]   . Gestational Age 37w6d. VSS. Cervix: very posterior and dilated to 0.5.  Fetal movement present. Patient denies , backache, vaginal discharge, pelvic pressure, UTI symptoms, GI problems, bloody show, vaginal bleeding, headache, visual disturbances, epigastric or URQ pain, rupture of membranes. No support persons present.  Action: Verbal consent for EFM. Triage assessment completed. EFM applied for NST, reactive. Uterine assessment revealed occasional, irregular ctx. Fetal assessment: Presumed adequate fetal oxygenation documented (see flow record). Patient education pamphlets given on fetal movement counts and recognizing labor. Patient instructed to report change in fetal movement, vaginal leaking of fluid or bleeding, abdominal pain, or any concerns related to the pregnancy to her nurse/physician.   Response: Dr. Ayala informed of pt status. Plan per provider is d/c to home. Patient verbalized understanding of education and verbalized agreement with plan. Discharged ambulatory at 2244.

## 2017-06-26 ENCOUNTER — HOSPITAL ENCOUNTER (OUTPATIENT)
Dept: ULTRASOUND IMAGING | Facility: CLINIC | Age: 32
Discharge: HOME | End: 2017-06-26
Attending: OBSTETRICS & GYNECOLOGY | Admitting: OBSTETRICS & GYNECOLOGY
Payer: COMMERCIAL

## 2017-06-26 ENCOUNTER — OFFICE VISIT (OUTPATIENT)
Dept: MATERNAL FETAL MEDICINE | Facility: CLINIC | Age: 32
End: 2017-06-26
Attending: OBSTETRICS & GYNECOLOGY
Payer: COMMERCIAL

## 2017-06-26 VITALS
HEART RATE: 73 BPM | DIASTOLIC BLOOD PRESSURE: 85 MMHG | RESPIRATION RATE: 18 BRPM | WEIGHT: 152.8 LBS | BODY MASS INDEX: 30.6 KG/M2 | SYSTOLIC BLOOD PRESSURE: 132 MMHG

## 2017-06-26 DIAGNOSIS — Z34.80 SUPERVISION OF OTHER NORMAL PREGNANCY, ANTEPARTUM: ICD-10-CM

## 2017-06-26 DIAGNOSIS — O35.06X0 FETAL HYDROCEPHALUS AFFECTING MANAGEMENT OF MOTHER IN SINGLETON PREGNANCY: ICD-10-CM

## 2017-06-26 DIAGNOSIS — Z34.80 SUPERVISION OF OTHER NORMAL PREGNANCY, ANTEPARTUM: Primary | ICD-10-CM

## 2017-06-26 PROCEDURE — 76819 FETAL BIOPHYS PROFIL W/O NST: CPT

## 2017-06-26 PROCEDURE — 99211 OFF/OP EST MAY X REQ PHY/QHP: CPT

## 2017-06-26 NOTE — MR AVS SNAPSHOT
"              After Visit Summary   6/26/2017    Jo Ann Patel    MRN: [id number]           Patient Information     Date Of Birth          1985        Visit Information        Provider Department      6/26/2017 9:30 AM Lucero Batres, DO [facility] Maternal Fetal Medicine [facility]        Today's Diagnoses     Supervision of other normal pregnancy, antepartum    -  1    Fetal hydrocephalus affecting management of mother in thomas pregnancy           Follow-ups after your visit        Your next 10 appointments already scheduled     Jul 03, 2017   Procedure with Luz Maria Kwan MD   [facility] (--)    [address] [address] [phone]              Who to contact     If you have questions or need follow up information about today's clinic visit or your schedule please contact [facility] MATERNAL FETAL MEDICINE [facility] directly at [phone].  Normal or non-critical lab and imaging results will be communicated to you by MyChart, letter or phone within 4 business days after the clinic has received the results. If you do not hear from us within 7 days, please contact the clinic through [employer] [facility] or phone. If you have a critical or abnormal lab result, we will notify you by phone as soon as possible.  Submit refill requests through [employer] or call your pharmacy and they will forward the refill request to us. Please allow 3 business days for your refill to be completed.          Additional Information About Your Visit        MyChart Information     [employer] lets you send messages to your doctor, view your test results, renew your prescriptions, schedule appointments and more. To sign up, go to [email or web address] . Click on \"Log in\" on the left side of the screen, which will take you to the Welcome page. Then click on \"Sign up Now\" on the right side of the page.     You will be asked to enter the access code listed below, as well as some personal information. Please " follow the directions to create your username and password.     Your access code is: [email or web address]  Expires: 2017  9:52 AM     Your access code will  in 90 days. If you need help or a new code, please call your [place] clinic or [phone].        Care EveryWhere ID     This is your Care EveryWhere ID. This could be used by other organizations to access your [place] medical records  [id number]        Your Vitals Were     Pulse Respirations Last Period BMI (Body Mass Index)          73 18 10/01/2016 30.6 kg/m2         Blood Pressure from Last 3 Encounters:   17 132/85   17 134/80   17 126/78    Weight from Last 3 Encounters:   17 69.3 kg (152 lb 12.8 oz)   17 68.9 kg (151 lb 12.8 oz)   17 68.5 kg (151 lb)              We Performed the Following     [facility] Office Visit        Primary Care Provider    [facility]       No address on file        Equal Access to Services     IRAM GORDON : Hadii delvin ku hadasho Soomaali, waaxda luqadaha, qaybta kaalmada adeegyaalicia, asim unger . So [facility] [phone].    ATENCIÓN: Si habla español, tiene a schafer disposición servicios gratuitos de asistencia lingüística. Llame al [phone].    We comply with applicable federal civil rights laws and [place] laws. We do not discriminate on the basis of race, color, national origin, age, disability sex, sexual orientation or gender identity.            Thank you!     Thank you for choosing [facility]  for your care. Our goal is always to provide you with excellent care. Hearing back from our patients is one way we can continue to improve our services. Please take a few minutes to complete the written survey that you may receive in the mail after your visit with us. Thank you!             Your Updated Medication List - Protect others around you: Learn how to safely use, store and throw away your medicines at  www.disposemymeds.org.          This list is accurate as of: 6/26/17  4:46 PM.  Always use your most recent med list.                   Brand Name Dispense Instructions for use Diagnosis    pyridOXINE 25 MG tablet    vitamin B-6     Take 25 mg by mouth        NERIS DHA 27-0.6-0.4-300 MG Caps

## 2017-06-26 NOTE — NURSING NOTE
"Jo Ann seen in clinic today for BPP and OB visit at 38w2d gestation due to pregnancy c/b fetal bilateral hydrocephalus (see report/notes).  present with spouse and child. VSS. Pt denies bldg/lof/change in discharge/contractions/vision changes/epigastric pain/chest pain/SOB.  Wt increased 2 lbs.  Jo Ann continues to have 2+ pitting edema in bilateral lower extremities.  Continues to have running nose and sinus HA, no fever or cough.  Dr. Batres met with pt and discussed POC. Plan: primary  section Monday 7/3/17 at 1030.  Scrub instructions and \"getting ready for your  section\" teaching forms reviewed and given to patient alone with surgical scrub.  Reviewed s/sx of preeclampsia/HTN and labor with Jo Ann.  Jo Ann will call M this week if she desires BP check on Friday.  Patient verbalizes understanding.  Pt discharged stable and ambulatory.    Nicolasa Breaux RN  RN 15min     "

## 2017-06-26 NOTE — PROGRESS NOTES
Maternal-Fetal Medicine Prenatal Visit    Jo Ann Patel MRN# [id number]   Age: 32 year old  Estimated Date of Delivery: 2017            Gestational age: 38w2d YOB: 1985             Subjective:     Still has cold symptoms and mild sinus HA.  Has not tried tylenol.  No visual changes.  C/o rash on abdomen that is very itchy.  Tried hydrocortisone cream.  Bilateral leg swelling is same- does improve slightly with elevation.  +FM.  No LOF.  Intermittent, non painful contractions.  Seen in L&D over the weekend for rule out labor.           Objective:        /85 (BP Location: Left arm, Patient Position: Chair)  Pulse 73  Resp 18  Wt 69.3 kg (152 lb 12.8 oz)  LMP 10/01/2016  BMI 30.6 kg/m2         Results for orders placed or performed during the hospital encounter of 17 (from the past 24 hour(s))   [facility] BPP Single    Narrative            BPP  ---------------------------------------------------------------------------------------------------------  Pat. Name: JO ANN BYRD       Study Date:  2017 8:58am  Pat. NO:  [id number]        Referring  MD: BUNNY ROTH  Site:  [facility]       Sonographer: Rissa Franklin RDMS  :  1985        Age:   32  ---------------------------------------------------------------------------------------------------------    INDICATION  ---------------------------------------------------------------------------------------------------------  Hydrocephalus .      METHOD  ---------------------------------------------------------------------------------------------------------  Transabdominal ultrasound examination.      PREGNANCY  ---------------------------------------------------------------------------------------------------------  Reyes pregnancy. Number of fetuses: 1.      DATING  ---------------------------------------------------------------------------------------------------------                                            Date                                Details                                                                                      Gest. age                      KERWIN  LMP                                  10/1/2016                                                                                                                         38 w + 2 d                     7/8/2017  External assessment          11/15/2016                       GA: 6 w + 1 d                                                                            38 w + 0 d                     7/10/2017  Assigned dating                  Dating performed on 06/12/2017, based on the LMP                                                            38 w + 2 d                     7/8/2017      GENERAL EVALUATION  ---------------------------------------------------------------------------------------------------------  Cardiac activity: present.  bpm.  Fetal movements: visualized.  Presentation: cephalic.  Placenta:  Placental site: posterior, fundal.  Umbilical cord: previously studied.      AMNIOTIC FLUID ASSESSMENT  ---------------------------------------------------------------------------------------------------------  Amount of AF: normal  MVP 4.1 cm. PATRICK 12.7 cm. Q1 2.7 cm, Q2 3.9 cm, Q3 2.0 cm, Q4 4.1 cm      BIOPHYSICAL PROFILE  ---------------------------------------------------------------------------------------------------------  2: Fetal breathing movements  2: Gross body movements  2: Fetal tone  2: Amniotic fluid volume  8/8: Biophysical profile score      MATERNAL STRUCTURES  ---------------------------------------------------------------------------------------------------------  Cervix                                  Not examined.      RECOMMENDATION  ---------------------------------------------------------------------------------------------------------  We discussed the findings on today's ultrasound with the patient.    See Epic notes for  further details. Patient is scheduled for c/s on .    Return to primary provider for continued prenatal care.    Thank you for the opportunity to participate in the care of this patient. If you have questions regarding today's evaluation or if we can be of further service, please contact the  M[facility]Fetal Medicine Center.    **Fetal anomalies may be present but not detected**.        Impression    IMPRESSION  ---------------------------------------------------------------------------------------------------------  1) Intrauterine pregnancy at 38 2/7 weeks gestational age.  2) The BPP is reassuring.  3) The amniotic fluid volume appeared normal.           Labs:     GBS Status:   Lab Results   Component Value Date    GBS  2017     Negative  No GBS DNA detected, presumed negative for GBS or number of bacteria may be   below the limit of detection of the assay.   Assay performed on incubated broth culture of specimen using [employer] real-time   PCR.   Assay performed on incubated broth culture of specimen using [employer] real-time   PCR.                Gen: NAD, alert, comfortable       Lungs: non labored       Abdomen: Gravid, Soft, Non-tender, maculopapular, mildly erythematous rash to abdomen with some excoriations from scratching       Ext: 2-3+ pitting edema of feet, legs bilaterally          Assessment:        32 year old y.o.  at 38w2d        1) Fetal hydrocephalus possible aqueductal stenosis s/p neurosurg consult       2) Macrocephaly- c/s at 39 2/7 weeks on        3) PUPPS- continue hydrocortisone, declined vistaril       4) Bilateral edema of pregnancy- continue supportive cares.  RLE Doppler negative for DVT          Plan:        BP slightly more elevated than last week.  Denies symptoms.  Welcome to come back later this week for repeat BP check if desires.      Primary c/s on  for macrocephaly          Attestation:   The patient was seen for an established  outpatient visit.  I spent a total of 10 minutes face to face with Jo Ann Patel during today's visit. Over 50% of this time was spent counseling the patient and/or coordinating care regarding  management.     Lucero Batres, DO  Maternal-Fetal Medicine   2017

## 2017-06-28 ENCOUNTER — ANESTHESIA EVENT (OUTPATIENT)
Dept: OBGYN | Facility: CLINIC | Age: 32
End: 2017-06-28
Payer: COMMERCIAL

## 2017-07-03 ENCOUNTER — HOSPITAL ENCOUNTER (INPATIENT)
Facility: CLINIC | Age: 32
LOS: 3 days | Discharge: HOME | End: 2017-07-06
Attending: OBSTETRICS & GYNECOLOGY | Admitting: OBSTETRICS & GYNECOLOGY
Payer: COMMERCIAL

## 2017-07-03 ENCOUNTER — OFFICE VISIT (OUTPATIENT)
Dept: INTERPRETER SERVICES | Facility: CLINIC | Age: 32
End: 2017-07-03

## 2017-07-03 ENCOUNTER — ANESTHESIA (OUTPATIENT)
Dept: OBGYN | Facility: CLINIC | Age: 32
End: 2017-07-03
Payer: COMMERCIAL

## 2017-07-03 ENCOUNTER — SURGERY (OUTPATIENT)
Age: 32
End: 2017-07-03

## 2017-07-03 DIAGNOSIS — Z98.890 POST-OPERATIVE STATE: Primary | ICD-10-CM

## 2017-07-03 DIAGNOSIS — D62 ANEMIA DUE TO BLOOD LOSS, ACUTE: ICD-10-CM

## 2017-07-03 LAB
ABO + RH BLD: NORMAL
ABO + RH BLD: NORMAL
BASOPHILS # BLD AUTO: 0 10E9/L (ref 0–0.2)
BASOPHILS NFR BLD AUTO: 0.3 %
BLD GP AB SCN SERPL QL: NORMAL
BLOOD BANK CMNT PATIENT-IMP: NORMAL
DIFFERENTIAL METHOD BLD: ABNORMAL
EOSINOPHIL # BLD AUTO: 0.2 10E9/L (ref 0–0.7)
EOSINOPHIL NFR BLD AUTO: 2.3 %
ERYTHROCYTE [DISTWIDTH] IN BLOOD BY AUTOMATED COUNT: 12.7 % (ref 10–15)
HCT VFR BLD AUTO: 32.3 % (ref 35–47)
HGB BLD-MCNC: 10.8 G/DL (ref 11.7–15.7)
IMM GRANULOCYTES # BLD: 0 10E9/L (ref 0–0.4)
IMM GRANULOCYTES NFR BLD: 0.5 %
LYMPHOCYTES # BLD AUTO: 2 10E9/L (ref 0.8–5.3)
LYMPHOCYTES NFR BLD AUTO: 27.4 %
MCH RBC QN AUTO: 30.6 PG (ref 26.5–33)
MCHC RBC AUTO-ENTMCNC: 33.4 G/DL (ref 31.5–36.5)
MCV RBC AUTO: 92 FL (ref 78–100)
MONOCYTES # BLD AUTO: 0.7 10E9/L (ref 0–1.3)
MONOCYTES NFR BLD AUTO: 9.5 %
NEUTROPHILS # BLD AUTO: 4.5 10E9/L (ref 1.6–8.3)
NEUTROPHILS NFR BLD AUTO: 60 %
NRBC # BLD AUTO: 0 10*3/UL
NRBC BLD AUTO-RTO: 0 /100
PLATELET # BLD AUTO: 217 10E9/L (ref 150–450)
RBC # BLD AUTO: 3.53 10E12/L (ref 3.8–5.2)
SPECIMEN EXP DATE BLD: NORMAL
T PALLIDUM IGG+IGM SER QL: NEGATIVE
WBC # BLD AUTO: 7.4 10E9/L (ref 4–11)

## 2017-07-03 PROCEDURE — 88262 CHROMOSOME ANALYSIS 15-20: CPT | Performed by: OBSTETRICS & GYNECOLOGY

## 2017-07-03 PROCEDURE — 37000008 ZZH ANESTHESIA TECHNICAL FEE, 1ST 30 MIN: Performed by: OBSTETRICS & GYNECOLOGY

## 2017-07-03 PROCEDURE — 36000057 ZZH SURGERY LEVEL 3 1ST 30 MIN - UMMC: Performed by: OBSTETRICS & GYNECOLOGY

## 2017-07-03 PROCEDURE — 36000059 ZZH SURGERY LEVEL 3 EA 15 ADDTL MIN UMMC: Performed by: OBSTETRICS & GYNECOLOGY

## 2017-07-03 PROCEDURE — 71000014 ZZH RECOVERY PHASE 1 LEVEL 2 FIRST HR: Performed by: OBSTETRICS & GYNECOLOGY

## 2017-07-03 PROCEDURE — 25000128 H RX IP 250 OP 636

## 2017-07-03 PROCEDURE — 88233 TISSUE CULTURE SKIN/BIOPSY: CPT | Performed by: OBSTETRICS & GYNECOLOGY

## 2017-07-03 PROCEDURE — 27210794 ZZH OR GENERAL SUPPLY STERILE: Performed by: OBSTETRICS & GYNECOLOGY

## 2017-07-03 PROCEDURE — T1013 SIGN LANG/ORAL INTERPRETER: HCPCS | Mod: U3

## 2017-07-03 PROCEDURE — C9290 INJ, BUPIVACAINE LIPOSOME: HCPCS | Performed by: STUDENT IN AN ORGANIZED HEALTH CARE EDUCATION/TRAINING PROGRAM

## 2017-07-03 PROCEDURE — 37000009 ZZH ANESTHESIA TECHNICAL FEE, EACH ADDTL 15 MIN: Performed by: OBSTETRICS & GYNECOLOGY

## 2017-07-03 PROCEDURE — 86901 BLOOD TYPING SEROLOGIC RH(D): CPT | Performed by: OBSTETRICS & GYNECOLOGY

## 2017-07-03 PROCEDURE — 25000128 H RX IP 250 OP 636: Performed by: OBSTETRICS & GYNECOLOGY

## 2017-07-03 PROCEDURE — 88307 TISSUE EXAM BY PATHOLOGIST: CPT | Performed by: OBSTETRICS & GYNECOLOGY

## 2017-07-03 PROCEDURE — 71000015 ZZH RECOVERY PHASE 1 LEVEL 2 EA ADDTL HR: Performed by: OBSTETRICS & GYNECOLOGY

## 2017-07-03 PROCEDURE — 86850 RBC ANTIBODY SCREEN: CPT | Performed by: OBSTETRICS & GYNECOLOGY

## 2017-07-03 PROCEDURE — 86780 TREPONEMA PALLIDUM: CPT | Performed by: OBSTETRICS & GYNECOLOGY

## 2017-07-03 PROCEDURE — 86900 BLOOD TYPING SEROLOGIC ABO: CPT | Performed by: OBSTETRICS & GYNECOLOGY

## 2017-07-03 PROCEDURE — 27110028 ZZH OR GENERAL SUPPLY NON-STERILE: Performed by: OBSTETRICS & GYNECOLOGY

## 2017-07-03 PROCEDURE — 25000128 H RX IP 250 OP 636: Performed by: ANESTHESIOLOGY

## 2017-07-03 PROCEDURE — 25000132 ZZH RX MED GY IP 250 OP 250 PS 637: Performed by: OBSTETRICS & GYNECOLOGY

## 2017-07-03 PROCEDURE — 12000030 ZZH R&B OB INTERMEDIATE UMMC

## 2017-07-03 PROCEDURE — 25000125 ZZHC RX 250: Performed by: ANESTHESIOLOGY

## 2017-07-03 PROCEDURE — 25000125 ZZHC RX 250: Performed by: OBSTETRICS & GYNECOLOGY

## 2017-07-03 PROCEDURE — 40000170 ZZH STATISTIC PRE-PROCEDURE ASSESSMENT II: Performed by: OBSTETRICS & GYNECOLOGY

## 2017-07-03 PROCEDURE — 88307 TISSUE EXAM BY PATHOLOGIST: CPT | Mod: 26 | Performed by: OBSTETRICS & GYNECOLOGY

## 2017-07-03 PROCEDURE — 85025 COMPLETE CBC W/AUTO DIFF WBC: CPT | Performed by: OBSTETRICS & GYNECOLOGY

## 2017-07-03 PROCEDURE — 25000132 ZZH RX MED GY IP 250 OP 250 PS 637

## 2017-07-03 PROCEDURE — 25000128 H RX IP 250 OP 636: Performed by: STUDENT IN AN ORGANIZED HEALTH CARE EDUCATION/TRAINING PROGRAM

## 2017-07-03 PROCEDURE — 00000159 ZZHCL STATISTIC H-SEND OUTS PREP: Performed by: OBSTETRICS & GYNECOLOGY

## 2017-07-03 RX ORDER — LIDOCAINE 40 MG/G
CREAM TOPICAL
Status: DISCONTINUED | OUTPATIENT
Start: 2017-07-03 | End: 2017-07-03 | Stop reason: HOSPADM

## 2017-07-03 RX ORDER — NALOXONE HYDROCHLORIDE 0.4 MG/ML
.1-.4 INJECTION, SOLUTION INTRAMUSCULAR; INTRAVENOUS; SUBCUTANEOUS
Status: DISCONTINUED | OUTPATIENT
Start: 2017-07-03 | End: 2017-07-03 | Stop reason: HOSPADM

## 2017-07-03 RX ORDER — SODIUM CHLORIDE, SODIUM LACTATE, POTASSIUM CHLORIDE, CALCIUM CHLORIDE 600; 310; 30; 20 MG/100ML; MG/100ML; MG/100ML; MG/100ML
INJECTION, SOLUTION INTRAVENOUS
Status: COMPLETED
Start: 2017-07-03 | End: 2017-07-03

## 2017-07-03 RX ORDER — DIPHENHYDRAMINE HCL 25 MG
25 CAPSULE ORAL EVERY 6 HOURS PRN
Status: DISCONTINUED | OUTPATIENT
Start: 2017-07-03 | End: 2017-07-06 | Stop reason: HOSPADM

## 2017-07-03 RX ORDER — IBUPROFEN 400 MG/1
400-800 TABLET, FILM COATED ORAL EVERY 6 HOURS PRN
Status: DISCONTINUED | OUTPATIENT
Start: 2017-07-03 | End: 2017-07-06 | Stop reason: HOSPADM

## 2017-07-03 RX ORDER — OXYTOCIN/0.9 % SODIUM CHLORIDE 30/500 ML
340 PLASTIC BAG, INJECTION (ML) INTRAVENOUS CONTINUOUS PRN
Status: DISCONTINUED | OUTPATIENT
Start: 2017-07-03 | End: 2017-07-06 | Stop reason: HOSPADM

## 2017-07-03 RX ORDER — NALBUPHINE HYDROCHLORIDE 10 MG/ML
2.5-5 INJECTION, SOLUTION INTRAMUSCULAR; INTRAVENOUS; SUBCUTANEOUS EVERY 6 HOURS PRN
Status: DISCONTINUED | OUTPATIENT
Start: 2017-07-03 | End: 2017-07-04

## 2017-07-03 RX ORDER — LIDOCAINE 40 MG/G
CREAM TOPICAL
Status: DISCONTINUED | OUTPATIENT
Start: 2017-07-03 | End: 2017-07-06 | Stop reason: HOSPADM

## 2017-07-03 RX ORDER — BUPIVACAINE HYDROCHLORIDE AND EPINEPHRINE 2.5; 5 MG/ML; UG/ML
INJECTION, SOLUTION INFILTRATION; PERINEURAL PRN
Status: DISCONTINUED | OUTPATIENT
Start: 2017-07-03 | End: 2017-07-03

## 2017-07-03 RX ORDER — KETOROLAC TROMETHAMINE 30 MG/ML
INJECTION, SOLUTION INTRAMUSCULAR; INTRAVENOUS PRN
Status: DISCONTINUED | OUTPATIENT
Start: 2017-07-03 | End: 2017-07-03

## 2017-07-03 RX ORDER — NALBUPHINE HYDROCHLORIDE 10 MG/ML
2.5-5 INJECTION, SOLUTION INTRAMUSCULAR; INTRAVENOUS; SUBCUTANEOUS EVERY 6 HOURS PRN
Status: DISCONTINUED | OUTPATIENT
Start: 2017-07-03 | End: 2017-07-03 | Stop reason: HOSPADM

## 2017-07-03 RX ORDER — ONDANSETRON 2 MG/ML
4 INJECTION INTRAMUSCULAR; INTRAVENOUS EVERY 30 MIN PRN
Status: DISCONTINUED | OUTPATIENT
Start: 2017-07-03 | End: 2017-07-03 | Stop reason: HOSPADM

## 2017-07-03 RX ORDER — HYDROCORTISONE 2.5 %
CREAM (GRAM) TOPICAL 3 TIMES DAILY PRN
Status: DISCONTINUED | OUTPATIENT
Start: 2017-07-03 | End: 2017-07-06 | Stop reason: HOSPADM

## 2017-07-03 RX ORDER — SODIUM CHLORIDE, SODIUM LACTATE, POTASSIUM CHLORIDE, CALCIUM CHLORIDE 600; 310; 30; 20 MG/100ML; MG/100ML; MG/100ML; MG/100ML
INJECTION, SOLUTION INTRAVENOUS CONTINUOUS
Status: DISCONTINUED | OUTPATIENT
Start: 2017-07-03 | End: 2017-07-03 | Stop reason: HOSPADM

## 2017-07-03 RX ORDER — CITRIC ACID/SODIUM CITRATE 334-500MG
SOLUTION, ORAL ORAL
Status: COMPLETED
Start: 2017-07-03 | End: 2017-07-03

## 2017-07-03 RX ORDER — LABETALOL HYDROCHLORIDE 5 MG/ML
10 INJECTION, SOLUTION INTRAVENOUS
Status: DISCONTINUED | OUTPATIENT
Start: 2017-07-03 | End: 2017-07-03 | Stop reason: HOSPADM

## 2017-07-03 RX ORDER — EPHEDRINE SULFATE 50 MG/ML
5 INJECTION, SOLUTION INTRAMUSCULAR; INTRAVENOUS; SUBCUTANEOUS
Status: DISCONTINUED | OUTPATIENT
Start: 2017-07-03 | End: 2017-07-04

## 2017-07-03 RX ORDER — KETOROLAC TROMETHAMINE 30 MG/ML
30 INJECTION, SOLUTION INTRAMUSCULAR; INTRAVENOUS EVERY 6 HOURS
Status: COMPLETED | OUTPATIENT
Start: 2017-07-03 | End: 2017-07-04

## 2017-07-03 RX ORDER — AMOXICILLIN 250 MG
1-2 CAPSULE ORAL 2 TIMES DAILY
Status: DISCONTINUED | OUTPATIENT
Start: 2017-07-03 | End: 2017-07-06 | Stop reason: HOSPADM

## 2017-07-03 RX ORDER — MORPHINE SULFATE 1 MG/ML
INJECTION, SOLUTION EPIDURAL; INTRATHECAL; INTRAVENOUS PRN
Status: DISCONTINUED | OUTPATIENT
Start: 2017-07-03 | End: 2017-07-03

## 2017-07-03 RX ORDER — ONDANSETRON 2 MG/ML
INJECTION INTRAMUSCULAR; INTRAVENOUS PRN
Status: DISCONTINUED | OUTPATIENT
Start: 2017-07-03 | End: 2017-07-03

## 2017-07-03 RX ORDER — CEFAZOLIN SODIUM 2 G/100ML
2 INJECTION, SOLUTION INTRAVENOUS
Status: COMPLETED | OUTPATIENT
Start: 2017-07-03 | End: 2017-07-03

## 2017-07-03 RX ORDER — ONDANSETRON 2 MG/ML
4 INJECTION INTRAMUSCULAR; INTRAVENOUS EVERY 6 HOURS PRN
Status: DISCONTINUED | OUTPATIENT
Start: 2017-07-03 | End: 2017-07-06 | Stop reason: HOSPADM

## 2017-07-03 RX ORDER — MISOPROSTOL 200 UG/1
400 TABLET ORAL
Status: DISCONTINUED | OUTPATIENT
Start: 2017-07-03 | End: 2017-07-06 | Stop reason: HOSPADM

## 2017-07-03 RX ORDER — OXYTOCIN 10 [USP'U]/ML
10 INJECTION, SOLUTION INTRAMUSCULAR; INTRAVENOUS
Status: DISCONTINUED | OUTPATIENT
Start: 2017-07-03 | End: 2017-07-06 | Stop reason: HOSPADM

## 2017-07-03 RX ORDER — ONDANSETRON 4 MG/1
4 TABLET, ORALLY DISINTEGRATING ORAL EVERY 30 MIN PRN
Status: DISCONTINUED | OUTPATIENT
Start: 2017-07-03 | End: 2017-07-03 | Stop reason: HOSPADM

## 2017-07-03 RX ORDER — LANOLIN 100 %
OINTMENT (GRAM) TOPICAL
Status: DISCONTINUED | OUTPATIENT
Start: 2017-07-03 | End: 2017-07-06 | Stop reason: HOSPADM

## 2017-07-03 RX ORDER — EPHEDRINE SULFATE 50 MG/ML
5 INJECTION, SOLUTION INTRAMUSCULAR; INTRAVENOUS; SUBCUTANEOUS
Status: DISCONTINUED | OUTPATIENT
Start: 2017-07-03 | End: 2017-07-03 | Stop reason: HOSPADM

## 2017-07-03 RX ORDER — BUPIVACAINE HYDROCHLORIDE 7.5 MG/ML
INJECTION, SOLUTION INTRASPINAL PRN
Status: DISCONTINUED | OUTPATIENT
Start: 2017-07-03 | End: 2017-07-03

## 2017-07-03 RX ORDER — OXYTOCIN/0.9 % SODIUM CHLORIDE 30/500 ML
100 PLASTIC BAG, INJECTION (ML) INTRAVENOUS CONTINUOUS
Status: DISCONTINUED | OUTPATIENT
Start: 2017-07-03 | End: 2017-07-06 | Stop reason: HOSPADM

## 2017-07-03 RX ORDER — NALOXONE HYDROCHLORIDE 0.4 MG/ML
.1-.4 INJECTION, SOLUTION INTRAMUSCULAR; INTRAVENOUS; SUBCUTANEOUS
Status: DISCONTINUED | OUTPATIENT
Start: 2017-07-03 | End: 2017-07-03

## 2017-07-03 RX ORDER — BISACODYL 10 MG
10 SUPPOSITORY, RECTAL RECTAL DAILY PRN
Status: DISCONTINUED | OUTPATIENT
Start: 2017-07-05 | End: 2017-07-06 | Stop reason: HOSPADM

## 2017-07-03 RX ORDER — CEFAZOLIN SODIUM 1 G/3ML
1 INJECTION, POWDER, FOR SOLUTION INTRAMUSCULAR; INTRAVENOUS
Status: DISCONTINUED | OUTPATIENT
Start: 2017-07-03 | End: 2017-07-03 | Stop reason: HOSPADM

## 2017-07-03 RX ORDER — DIPHENHYDRAMINE HYDROCHLORIDE 50 MG/ML
25 INJECTION INTRAMUSCULAR; INTRAVENOUS EVERY 6 HOURS PRN
Status: DISCONTINUED | OUTPATIENT
Start: 2017-07-03 | End: 2017-07-06 | Stop reason: HOSPADM

## 2017-07-03 RX ORDER — NALOXONE HYDROCHLORIDE 0.4 MG/ML
.1-.4 INJECTION, SOLUTION INTRAMUSCULAR; INTRAVENOUS; SUBCUTANEOUS
Status: DISCONTINUED | OUTPATIENT
Start: 2017-07-03 | End: 2017-07-06 | Stop reason: HOSPADM

## 2017-07-03 RX ORDER — ACETAMINOPHEN 325 MG/1
325-650 TABLET ORAL EVERY 4 HOURS PRN
Status: DISCONTINUED | OUTPATIENT
Start: 2017-07-03 | End: 2017-07-06 | Stop reason: HOSPADM

## 2017-07-03 RX ORDER — SIMETHICONE 80 MG
80 TABLET,CHEWABLE ORAL 4 TIMES DAILY PRN
Status: DISCONTINUED | OUTPATIENT
Start: 2017-07-03 | End: 2017-07-06 | Stop reason: HOSPADM

## 2017-07-03 RX ORDER — OXYTOCIN/0.9 % SODIUM CHLORIDE 30/500 ML
PLASTIC BAG, INJECTION (ML) INTRAVENOUS CONTINUOUS PRN
Status: DISCONTINUED | OUTPATIENT
Start: 2017-07-03 | End: 2017-07-03

## 2017-07-03 RX ORDER — CITRIC ACID/SODIUM CITRATE 334-500MG
30 SOLUTION, ORAL ORAL
Status: COMPLETED | OUTPATIENT
Start: 2017-07-03 | End: 2017-07-03

## 2017-07-03 RX ORDER — LIDOCAINE 40 MG/G
CREAM TOPICAL
Status: DISCONTINUED | OUTPATIENT
Start: 2017-07-03 | End: 2017-07-04

## 2017-07-03 RX ORDER — SODIUM CHLORIDE, SODIUM LACTATE, POTASSIUM CHLORIDE, CALCIUM CHLORIDE 600; 310; 30; 20 MG/100ML; MG/100ML; MG/100ML; MG/100ML
INJECTION, SOLUTION INTRAVENOUS CONTINUOUS PRN
Status: DISCONTINUED | OUTPATIENT
Start: 2017-07-03 | End: 2017-07-03

## 2017-07-03 RX ORDER — FENTANYL CITRATE 50 UG/ML
25-50 INJECTION, SOLUTION INTRAMUSCULAR; INTRAVENOUS
Status: DISCONTINUED | OUTPATIENT
Start: 2017-07-03 | End: 2017-07-03 | Stop reason: HOSPADM

## 2017-07-03 RX ORDER — FENTANYL CITRATE 50 UG/ML
INJECTION, SOLUTION INTRAMUSCULAR; INTRAVENOUS PRN
Status: DISCONTINUED | OUTPATIENT
Start: 2017-07-03 | End: 2017-07-03

## 2017-07-03 RX ADMIN — BUPIVACAINE HYDROCHLORIDE IN DEXTROSE 1.6 ML: 7.5 INJECTION, SOLUTION SUBARACHNOID at 12:54

## 2017-07-03 RX ADMIN — Medication 30 ML: at 10:02

## 2017-07-03 RX ADMIN — SODIUM CHLORIDE, POTASSIUM CHLORIDE, SODIUM LACTATE AND CALCIUM CHLORIDE: 600; 310; 30; 20 INJECTION, SOLUTION INTRAVENOUS at 12:45

## 2017-07-03 RX ADMIN — SODIUM CHLORIDE, POTASSIUM CHLORIDE, SODIUM LACTATE AND CALCIUM CHLORIDE 1000 ML: 600; 310; 30; 20 INJECTION, SOLUTION INTRAVENOUS at 10:58

## 2017-07-03 RX ADMIN — FENTANYL CITRATE 15 MCG: 50 INJECTION, SOLUTION INTRAMUSCULAR; INTRAVENOUS at 12:54

## 2017-07-03 RX ADMIN — CEFAZOLIN SODIUM 2 G: 2 INJECTION, SOLUTION INTRAVENOUS at 13:02

## 2017-07-03 RX ADMIN — PHENYLEPHRINE HYDROCHLORIDE 0.5 MCG/KG/MIN: 10 INJECTION, SOLUTION INTRAMUSCULAR; INTRAVENOUS; SUBCUTANEOUS at 12:45

## 2017-07-03 RX ADMIN — KETOROLAC TROMETHAMINE 30 MG: 30 INJECTION, SOLUTION INTRAMUSCULAR at 20:10

## 2017-07-03 RX ADMIN — OXYTOCIN-SODIUM CHLORIDE 0.9% IV SOLN 30 UNIT/500ML 300 ML/HR: 30-0.9/5 SOLUTION at 13:23

## 2017-07-03 RX ADMIN — BUPIVACAINE HYDROCHLORIDE AND EPINEPHRINE BITARTRATE 20 ML: 2.5; .005 INJECTION, SOLUTION INFILTRATION; PERINEURAL at 14:15

## 2017-07-03 RX ADMIN — KETOROLAC TROMETHAMINE 30 MG: 30 INJECTION, SOLUTION INTRAMUSCULAR at 14:10

## 2017-07-03 RX ADMIN — ONDANSETRON 4 MG: 2 INJECTION INTRAMUSCULAR; INTRAVENOUS at 13:37

## 2017-07-03 RX ADMIN — BUPIVACAINE 20 ML: 13.3 INJECTION, SUSPENSION, LIPOSOMAL INFILTRATION at 14:15

## 2017-07-03 RX ADMIN — OXYTOCIN-SODIUM CHLORIDE 0.9% IV SOLN 30 UNIT/500ML 100 ML/HR: 30-0.9/5 SOLUTION at 17:11

## 2017-07-03 RX ADMIN — PHENYLEPHRINE HYDROCHLORIDE 200 MCG: 10 INJECTION, SOLUTION INTRAMUSCULAR; INTRAVENOUS; SUBCUTANEOUS at 13:37

## 2017-07-03 RX ADMIN — Medication 150 MG: at 12:54

## 2017-07-03 RX ADMIN — PHENYLEPHRINE HYDROCHLORIDE 200 MCG: 10 INJECTION, SOLUTION INTRAMUSCULAR; INTRAVENOUS; SUBCUTANEOUS at 13:30

## 2017-07-03 RX ADMIN — SODIUM CHLORIDE, SODIUM LACTATE, POTASSIUM CHLORIDE, CALCIUM CHLORIDE 1000 ML: 600; 310; 30; 20 INJECTION, SOLUTION INTRAVENOUS at 10:58

## 2017-07-03 RX ADMIN — DIPHENHYDRAMINE HYDROCHLORIDE 25 MG: 25 CAPSULE ORAL at 22:56

## 2017-07-03 RX ADMIN — SODIUM CHLORIDE, POTASSIUM CHLORIDE, SODIUM LACTATE AND CALCIUM CHLORIDE 1000 ML: 600; 310; 30; 20 INJECTION, SOLUTION INTRAVENOUS at 09:55

## 2017-07-03 RX ADMIN — SODIUM CITRATE AND CITRIC ACID MONOHYDRATE 30 ML: 500; 334 SOLUTION ORAL at 10:02

## 2017-07-03 NOTE — IP AVS SNAPSHOT
MRN:[id number]                      After Visit Summary   7/3/2017    Jo Ann Patel    MRN: [id number]           Thank you!     Thank you for choosing [facility] for your care. Our goal is always to provide you with excellent care. Hearing back from our patients is one way we can continue to improve our services. Please take a few minutes to complete the written survey that you may receive in the mail after you visit with us. Thank you!        Patient Information     Date Of Birth          1985        Designated Caregiver       Most Recent Value    Caregiver    Will someone help with your care after discharge? yes    Name of designated caregiver Noah Duong    Phone number of caregiver [phone]    Caregiver address see face sheet, lives with patient      About your hospital stay     You were [facility] on:  July 3, 2017 [facility]    You were discharged on:  July 6, 2017        Reason for your hospital stay       You were admitted to the hospital for the delivery of your baby and postpartum care.                  Who to Call     For medical emergencies, please call 911.  For non-urgent questions about your medical care, please call your primary care provider or clinic, None  For questions related to your surgery, please call your surgery clinic        Attending Provider     Provider Luz Maria Gaston MD OB/Gyn       Primary Care Provider    [facility]       When to contact your care team       Call your OB clinic or return to the ED if you have any of the following:    - Temperature greater than 100.4F  - Pain not controlled by pain medications  - any symptoms or preeclampsia, including: Severe headache, visual changes, chest pain, shortness of breath, pain in the upper right abdomen, or sudden increase in swelling  - Uncontrolled nausea/vomiting  - Foul-smelling vaginal discharge  - Vaginal bleeding soaking 1  pad per hour for 2 hours in a row                  After Care Instructions     Activity       Discharge activity:  No intercourse and nothing in the vagina for 6 weeks  Continue walking and moving around frequently, increase activity as tolerated. No strenuous exercise for 6 weeks.   No driving for 2 weeks or until you can slam on the brakes with minimal discomfort. No driving while on narcotic pain medication.  Continue stool softeners while taking narcotic pain medications.            Diet       Regular diet                  Follow-up Appointments     Follow Up and recommended labs and tests       Please schedule a postpartum visit for 6 weeks from now.                  Further instructions from your care team       Postop  Birth Instructions    Activity       Do not lift more than 10 pounds for 6 weeks after surgery.  Ask family and friends for help when you need it.    No driving until you have stopped taking your pain medications (usually two weeks after surgery).    No heavy exercise or activity for 6 weeks.  Don't do anything that will put a strain on your surgery site.    Don't strain when using the toilet.  Your care team may prescribe a stool softener if you have problems with your bowel movements.     To care for your incision:       Keep the incision clean and dry.    Do not soak your incision in water. No swimming or hot tubs until it has fully healed. You may soak in the bathtub if the water level is below your incision.    Do not use peroxide, gel, cream, lotion, or ointment on your incision.    Adjust your clothes to avoid pressure on your surgery site (check the elastic in your underwear for example).     You may see a small amount of clear or pink drainage and this is normal.  Check with your health care provider:       If the drainage increases or has an odor.    If the incision reddens, you have swelling, or develop a rash.    If you have increased pain and the medicine we prescribed  doesn't help.    If you have a fever above 100.4 F (38 C) with or without chills when placing thermometer under your tongue.   The area around your incision (surgery wound), will feel numb.  This is normal. The numbness should go away in less than a year.     Keep your hands clean:  Always wash your hands before touching your incision (surgery wound). This helps reduce your risk of infection. If your hands aren't dirty, you may use an alcohol hand-rub to clean your hands. Keep your nails clean and short.    Call your healthcare provider if you have any of these symptoms:       You soak a sanitary pad with blood within 1 hour, or you see blood clots larger than a golf ball.    Bleeding that lasts more than 6 weeks.    Vaginal discharge that smells bad.    Severe pain, cramping or tenderness in your lower belly area.    A need to urinate more frequently (use the toilet more often), more urgently (use the toilet very quickly), or it burns when you urinate.    Nausea and vomiting.    Redness, swelling or pain around a vein in your leg.    Problems breastfeeding or a red or painful area on your breast.    Chest pain and cough or are gasping for air.    Problems with coping with sadness, anxiety or depression. If you have concerns about hurting yourself or the baby, call your provider immediately.      You have questions or concerns after you return home.               Birth Discharge Instructions: Thai  Actividad    No levante más de 10 libras aleksander las 6 semanas posteriores a schafer cirugía. Pida a los miembros de schafer cecilia y amigos que la ayuden cuando lo necesite.    No conduzca si está tomando píldoras para el dolor recetadas por schafer médico. Puede conducir si está tomando píldoras de venta jana para el dolor.    No jordan ejercicio ni actividades intensas por 6 semanas. No jordan nada que requiera un esfuerzo en el sitio de schafer cirugía.    No jordan fuerza al usar el baño. Schafer equipo de atención podría recetarle un  laxante si tiene problemas para  el intestino (estreñimiento).    Para cuidar de schafer incisión:    Mantenga la incisión limpia y seca    No empape schfaer incisión en agua. No nade ni use la cele ni jacuzzis hasta que schafer incisión haya cicatrizado por completo. Puede sentarse en la cele si el nivel del agua está por debajo de schafer incisión.    Después de lavarse, seque tyler schafer incisión. Incluya la piel que podría entrar en contacto con schafer incisión.    No use agua oxigenada, gel, cremas, lociones ni ungüentos sobre schafer incisión.    Ajuste schafer ropa para evitar la presión en el sitio de schafer cirugía (compruebe el elástico en schafer ropa interior, por ejemplo)    Si tiene Steri-Strips, deje las pequeñas tiras de cinta en schafer sitio. Se caerán solas o puede quitárselas después de ange semana.    Podría stephen ange pequeña cantidad de secreción transparente o rosada y esto es normal. Consulte con schafer proveedor de atención médica:    Si el drenaje aumenta o tiene olor.    Si tiene hinchazón, enrojecimiento o ange erupción alrededor de la incisión.    Si tiene más dolor y schafer medicamento para el dolor no ayuda    Si tiene fiebre de 100.4  F (38  C) o más (temperatura tomada bajo schafer lengua) con o sin escalofríos     El área alrededor de schafer incisión (herida de la cirugía) se sentirá adormecida. [place] es normal. El adormecimiento debería desaparecer en menos de un año.       Mantenga astrid taj limpias:  Siempre lávese las taj antes de tocar schafer incisión. [place] ayuda a reducir schafer riesgo de infección. Si astrid taj no están sucias, puede usar un gel de alcohol para limpiarse las taj. Mantenga astrid uñas cortas y limpias.   Llame a schafer proveedor de atención médica si tiene alguno de estos síntomas:    Empapa ange toalla femenina con campos en el correr de 1 hora o ve coágulos más grandes que ange pelota de golf.    Sangrado que dura más de 6 semanas.    Tiene ange secreción vaginal que huele mal.    Dolor, calambres o sensibilidad graves en la región  inferior de schafer vientre.    Necesidad más frecuente o urgente de orinar (hacer pis), o ardor al hacerlo.    Náuseas y vómitos    Enrojecimiento, hinchazón o dolor alrededor de ange vena en schfaer pierna.    Problemas para amamantar o un área enrojecida o dolorosa en schafer pecho.    Si tiene dolor en el pecho y tos o dificultad para respirar.    Problemas para manejar la tristeza, ansiedad o depresión.     Si le preocupa hacerse daño o hacerle daño al bebé, llame al médico de inmediato.    Tiene preguntas o inquietudes después de regresar a casa.       Birth Discharge Instructions  Activity    Do not lift more than 10 pounds for 6 weeks after surgery. Ask family and friends for help when you need it.    Do not drive while taking pain pills prescribed by your doctor. You may drive if taking over-the-counter pain pills.    No heavy exercise or activity for 6 weeks. Don t do anything that will put a strain on your surgery site.    Don t strain when using the toilet. Your care team may prescribe a stool softener if you have problems with your bowel movements (constipation).    To care for your incision:    Keep the incision clean and dry    Do not soak your incision in water. No swimming or hot tubs until your incision has fully healed. You may soak in the bathtub if the water level is below your incision.    After washing, dry your incision well. Include the skin that may come in contact with your incision.    Do not use any peroxide, gel, cream, lotion, or ointment on your incision.     Adjust your clothes to avoid pressure on your surgery site (check the elastic in your underwear for example)    If you have Steri-Strips, leave the small strips of tape in place. They will fall off on their own, or you can remove them after one week.    You may see a small amount of clear or pink drainage and this is normal. Check with your health care provider:    If the drainage increases or has an odor.    If you have swelling,  redness, or a rash around the incision.    If you have increased pain and your pain medicine doesn t help    If you have a fever of 100.4  F (38  C) or higher (temperature taken under your tongue), with or without chills   The area around your incision (surgery wound) will feel numb. This is normal. The numbness should go away in less than a year.   Keep your hands clean:   Always wash your hands before touching your incision. This helps reduce your risk of infection. If your hands aren t dirty, you may use an alcohol hand-rub to clean your hands. Keep your nails clean and short.     Call your health care provider if you have any of these symptoms:    You soak a sanitary pad with blood within 1 hour, or you see blood clots larger than a golf ball.    Bleeding that lasts more than 6 weeks.    You have vaginal discharge that smells bad.    Severe pain, cramping or tenderness in your lower belly area.    A more frequent or urgent need to urinate (pee), or it burns when you pee.    Nausea and vomiting.    Redness, swelling or pain around a vein in your leg.    Problems breastfeeding, or a red or painful area on your breast.    If you have chest pain and cough or are gasping for air.    Problems coping with sadness, anxiety, or depression.     If you have any concerns about hurting yourself of the baby, call your doctor right away.      You have questions or concerns after you return home.    Pending Results     Date and Time Order Name Status Description    7/4/2017 2156 Blood culture Preliminary     7/4/2017 2156 Blood culture Preliminary     7/3/2017 1339 Placenta path order and indications In process             Statement of Approval     Ordered          07/06/17 1136  I have reviewed and agree with all the recommendations and orders detailed in this document.  EFFECTIVE NOW     Approved and electronically signed by:  Nicolasa Lindsey MD             Admission Information     Date & Time Provider Department Dept.  "Phone    7/3/2017 Luz Maria Kwan MD [facility] [phone]      Your Vitals Were     Blood Pressure Pulse Temperature Respirations Height Weight    136/92 80 98.3  F (36.8  C) (Oral) 16 1.575 m (5' 2\") 68.9 kg (152 lb)    Last Period Pulse Oximetry BMI (Body Mass Index)             10/01/2016 98% 27.8 kg/m2         [employer] Information     [employer] lets you send messages to your doctor, view your test results, renew your prescriptions, schedule appointments and more. To sign up, go to [email or web address] . Click on \"Log in\" on the left side of the screen, which will take you to the Welcome page. Then click on \"Sign up Now\" on the right side of the page.     You will be asked to enter the access code listed below, as well as some personal information. Please follow the directions to create your username and password.     Your access code is: [email or web address]  Expires: 2017  9:52 AM     Your access code will  in 90 days. If you need help or a new code, please call your [place] clinic or [phone].        Care EveryWhere ID     This is your Care EveryWhere ID. This could be used by other organizations to access your [place] medical records  [id number]        Equal Access to Services     IRAM GORDON AH: Haddale Gaspar, waaxda lumarkel, qaybta kaalasim mayberry. So [facility] [phone].    ATENCIÓN: Si habla español, tiene a schafer disposición servicios gratuitos de asistencia lingüística. Ralph al [phone].    We comply with applicable federal civil rights laws and [place] laws. We do not discriminate on the basis of race, color, national origin, age, disability sex, sexual orientation or gender identity.               Review of your medicines      START taking        Dose / Directions    acetaminophen 325 MG tablet   Commonly known as:  TYLENOL        Dose:  325-650 mg   Take 1-2 tablets (325-650 mg) by mouth every 4 hours as needed for " mild pain or fever   Quantity:  100 tablet   Refills:  0       ferrous sulfate 325 (65 FE) MG tablet   Commonly known as:  IRON   Used for:  Anemia due to blood loss, acute        Dose:  325 mg   Take 1 tablet (325 mg) by mouth 2 times daily   Quantity:  60 tablet   Refills:  2       ibuprofen 600 MG tablet   Commonly known as:  ADVIL/MOTRIN        Dose:  600 mg   Take 1 tablet (600 mg) by mouth every 6 hours as needed for moderate pain   Quantity:  60 tablet   Refills:  0       oxyCODONE 5 MG IR tablet   Commonly known as:  ROXICODONE        Dose:  5 mg   Take 1 tablet (5 mg) by mouth every 4 hours as needed for pain   Quantity:  30 tablet   Refills:  0       senna-docusate 8.6-50 MG per tablet   Commonly known as:  SENOKOT-S;PERICOLACE        Dose:  1-2 tablet   Take 1-2 tablets by mouth 2 times daily   Quantity:  100 tablet   Refills:  0         CONTINUE these medicines which have NOT CHANGED        Dose / Directions    pyridOXINE 25 MG tablet   Commonly known as:  vitamin B-6        Dose:  25 mg   Take 25 mg by mouth   Refills:  0       ZATEAN-PN DHA 27-0.6-0.4-300 MG Caps        Refills:  0            Where to get your medicines      These medications were sent to [facility] [place], [place] - [address]  [address] [address]     Phone:  [phone]     acetaminophen 325 MG tablet    ferrous sulfate 325 (65 FE) MG tablet    ibuprofen 600 MG tablet    senna-docusate 8.6-50 MG per tablet         Some of these will need a paper prescription and others can be bought over the counter. Ask your nurse if you have questions.     Bring a paper prescription for each of these medications     oxyCODONE 5 MG IR tablet                Protect others around you: Learn how to safely use, store and throw away your medicines at www.disposemymeds.org.             Medication List: This is a list of all your medications and when to take them. Check marks below indicate your daily home  schedule. Keep this list as a reference.      Medications           Morning Afternoon Evening Bedtime As Needed    acetaminophen 325 MG tablet   Commonly known as:  TYLENOL   Take 1-2 tablets (325-650 mg) by mouth every 4 hours as needed for mild pain or fever   Last time this was given:  650 mg on 7/6/2017  8:27 AM                                ferrous sulfate 325 (65 FE) MG tablet   Commonly known as:  IRON   Take 1 tablet (325 mg) by mouth 2 times daily                                ibuprofen 600 MG tablet   Commonly known as:  ADVIL/MOTRIN   Take 1 tablet (600 mg) by mouth every 6 hours as needed for moderate pain   Last time this was given:  800 mg on 7/6/2017  8:27 AM                                oxyCODONE 5 MG IR tablet   Commonly known as:  ROXICODONE   Take 1 tablet (5 mg) by mouth every 4 hours as needed for pain                                pyridOXINE 25 MG tablet   Commonly known as:  vitamin B-6   Take 25 mg by mouth                                senna-docusate 8.6-50 MG per tablet   Commonly known as:  SENOKOT-S;PERICOLACE   Take 1-2 tablets by mouth 2 times daily   Last time this was given:  1 tablet on 7/6/2017  8:28 AM                                ZATEAN-PN DHA 27-0.6-0.4-300 MG Caps                                          More Information        Anemia, tipo no especificado (adulto)  Los glóbulos rojos llevan el oxígeno a los tejidos de schafer cuerpo. La anemia es ange afección que hace que usted tenga muy pocos glóbulos rojos. Para producir glóbulos rojos, necesita rex. La causa más común de la anemia es no tener rex suficiente. [place] puede deberse a distintas cosas:    Pérdida de campos. [place] puede ser por tener períodos menstruales abundantes. También puede deberse a un sangrado en el estómago o los intestinos.    Dieta deficiente. Puede que no esté comiendo suficientes alimentos con rex.  La anemia también puede deberse a la falta de ciertas vitaminas, a ange enfermedad renal  crónica u otras enfermedades crónicas.  La anemia hace que usted se sienta cansado y sin fuerzas. Si la anemia se vuelve grave, schafer piel se verá pálida. Puede que sienta que le falta el aire después de hacer actividad física. Otros síntomas incluyen:    Tabitha de yas    Mareo    Calambres en las piernas cuando hace actividad física    Somnolencia  Cuidados en la casa  Siga estos consejos para cuidar de usted en schafer casa:    No se sobre exija.    Hable con schafer proveedor de atención médica antes de viajar por aire o viajar a grandes altitudes.  Visita de control  Programe ange visita de control con schafer proveedor de atención médica o según le hayan indicado. Puede que necesite otros análisis de campos para stephen cuál es la causa exacta de schafer anemia. Si le hicieron análisis hoy, es posible que se necesiten varios días para obtener todos los resultados. Puede hacer un seguimiento con schafer propio proveedor de atención médica para conocer los resultados.   Cuándo debe buscar atención médica?  Llame a schafer proveedor de atención médica de inmediato si tiene cualquiera de los siguientes síntomas:    Falta de aire o dolor en el pecho    Mareo o desmayos que empeoran    Tiene campos en el vómito o heces de color negruzco o rojizo  Date Last Reviewed: 2/25/2016 2000-2017 [employer]. [address] [place] [address], [place] [address]. Todos los derechos reservados. Esta información no pretende sustituir la atención médica profesional. Sólo schafer médico puede diagnosticar y tratar un problema de jeannette.

## 2017-07-03 NOTE — IP AVS SNAPSHOT
[facility] [place]    [address] [facility] [address]    Phone:  [phone]                                       After Visit Summary   7/3/2017    Jo Ann Patel    MRN: [id number]           After Visit Summary Signature Page     I have received my discharge instructions, and my questions have been answered. I have discussed any challenges I see with this plan with the nurse or doctor.    ..........................................................................................................................................  Patient/Patient Representative Signature      ..........................................................................................................................................  Patient Representative Print Name and Relationship to Patient    ..................................................               ................................................  Date                                            Time    ..........................................................................................................................................  Reviewed by Signature/Title    ...................................................              ..............................................  Date                                                            Time

## 2017-07-03 NOTE — ANESTHESIA CARE TRANSFER NOTE
Patient: Jo Ann Patel    Procedure(s):  Primary  Section  - Wound Class: II-Clean Contaminated    Diagnosis: Bilateral Hydrocephalus   Diagnosis Additional Information: No value filed.    Anesthesia Type:   Spinal     Note:  Airway :Room Air  Patient transferred to:PACU  Comments: Transferred to pacu in stable condition, patient comfortable and in no pain on inquiry. TAP blocks performed prior to leaving the O.R. Sign out given to RN in pacu    Maykel Perrin MD  CA2  [id number]      Vitals: (Last set prior to Anesthesia Care Transfer)    CRNA VITALS  7/3/2017 1347 - 7/3/2017 1424      7/3/2017             Ht Rate: 69    Resp Rate (set): 10                Electronically Signed By: Talya Mcdaniels MD  July 3, 2017  2:24 PM

## 2017-07-03 NOTE — H&P
[facility]  OB History and Physical      Jo Ann Ptael MRN# [id number]   Age: 32 year old YOB: 1985     CC: Scheduled     HPI:  Ms. Jo Ann Patel is a 32 year old  at 39w2d by LMP c/w 6w1d US, who presents for scheduled .  She denies contractions, vaginal bleeding, and loss of fluid.  Reports normal fetal movement.    Pregnancy Complications:  1.  Fetal hydrocephalus, lateral and third ventricles; suspected fetal aqueductal stenosis - planning cord blood collection for karyotype and CGH. S/p pediatric neuro and neonatology consults.  2.  Fetal macrocephaly and macrosomia, AC 91%ile, head measurements out of range. .9mm and .5mm on 17  3.  PUPPS, on hydrocortisone  4.  Significant b/l LE edema, s/p RLE doppler nl    Prenatal Labs:   No results found for: ABO, RH, AS, HEPBANG, CHPCRT, GCPCRT, TREPAB, RPR, RUBELLAABIGG, HGB, HIV    GBS Status:   Lab Results   Component Value Date    GBS  2017     Negative  No GBS DNA detected, presumed negative for GBS or number of bacteria may be   below the limit of detection of the assay.   Assay performed on incubated broth culture of specimen using General Compression real-time   PCR.   Assay performed on incubated broth culture of specimen using General Compression real-time   PCR.         Ultrasounds  17 Comp  1) Intrauterine pregnancy at 36 2/7 weeks gestational age.  2) There is fetal hydrocephalus. The lateral ventricles and third ventricle are dilated. The remainder of the visualized fetal anatomy appears normal. A CSP cannot be well  seen.  3) Growth parameters and estimated fetal weight were consistent with MACROSOMIA and MACROCEPHALY. The head measurements are out of range. The AC is at the  91%ile.  4) The amniotic fluid volume appeared normal.  5) The BPP is reassuring.       OB History  Obstetric History       T1      L1     SAB0   TAB0   Ectopic0   Multiple0   Live  Births0       # Outcome Date GA Lbr Amador/2nd Weight Sex Delivery Anes PTL Lv   2 Current            1 Term 08 40w0d  2.722 kg (6 lb) F    SUSHILA          PMHx: Denies PMH of asthma, HTN, DM, bleeding disorders, anesthesia reactions.    Past Medical History:   Diagnosis Date     NO ACTIVE PROBLEMS      Wounds and injuries     Fell @ 19 wks, went to [facility], stable     PSHx: Up to date  Past Surgical History:   Procedure Laterality Date     APPENDECTOMY        Meds: PNV  No prescriptions prior to admission.     Allergies:  No Known Allergies   FmHx: No family history on file.  SocHx: She denies any tobacco, alcohol, or other drug use during this pregnancy.    ROS:   Complete 10-point ROS negative except as noted in HPI. She denies headache, blurry vision, chest pain, shortness of breath, RUQ pain, nausea, vomiting, dysuria, hematuria or extremity edema.    PE:  Vit: No data found.     Gen: Well-appearing, NAD, comfortable   CV: rrr, no mrg   Pulm: Ctab, no wheezes or crackles   Abd: Soft, gravid, non-tender, +BS   Ext: trace LE edema b/l  Cx: Not performed    Pres:  [place] by Leop[place]  EFW:  94% on 17 US  Memb: Intact              FHT: Baseline 125, mod variability, present accelerations, absent decelerations   [place]: 1-3 contractions in 10 minutes      Assessment  Ms. Jo Ann Patel is a 32 year old , at 39w2d by LMP c/w 6w1d US, who presents for scheduled . Discussed risks of surgery including bleeding requiring uterotonics, transfusion or additional procedures; injury to bowel, bladder, blood vessels, nerves, uterus, fallopian tubes, ovaries, baby; postoperative complications including infection, pain and DVT/PE. Consent signed. All questions answered. We did discuss classical  and possible vertical midline incision as well.    Plan  Labor: Admit to labor and delivery for scheduled  section.  PPx: Preoperative ancef, SCD's  PNC: GBS  neg, Rub immune, Hep B NR, GCT  84, Placenta posterior fundal, BMI 30  PPH Risk: Moderate, type and screen      Krista Stanton MD  OB-GYN PGY-2  07/03/17  6:34 AM    [facility] staff:  Appreciate note by Dr. Stanton.  I have seen and examined the patient without the resident. I have reviewed, edited, and agree with the note.        Luz Maria Kwna MD, FACOG  7/3/2017  9:13 PM

## 2017-07-03 NOTE — ANESTHESIA PROCEDURE NOTES
Spinal/LP Procedure Note    Spinal Block  Staff:     Anesthesiologist:  ALYSSA BAEZ    Resident/CRNA:  AKILAH OVALLE    Spinal/LP performed by resident/CRNA in presence of a teaching physician.      Referred By:  SHERI BRADLEY  Location: OR  Procedure Start/Stop Times:      7/3/2017 12:45 PM     7/3/2017 12:55 PM    patient identified, IV checked, site marked, risks and benefits discussed, informed consent, monitors and equipment checked, pre-op evaluation, at physician/surgeon's request and post-op pain management      Correct Patient: Yes      Correct Position: Yes      Correct Site: Yes      Correct Procedure: Yes      Correct Laterality:  Yes    Site Marked:  Yes  Procedure:     Procedure:  Intrathecal    ASA:  2    Diagnosis:   section    Position:  Sitting    Sterile Prep: chloraprep      Insertion site:  L3-4    Approach:  Midline    Needle Type:  Veronica    Needle gauge (G):  25    Local Skin Infiltration:  1% lidocaine    amount (ml):  3    Needle Length (in):  4    Introducer used: Yes      Introducer gauge:  20 G    Attempts:  1    Redirects:  2    CSF:  Clear    Paresthesias:  No    Time injected:  12:54  Assessment/Narrative:     Sensory Level:  T4

## 2017-07-03 NOTE — OP NOTE
Surgery Date: 7/3/2017  Surgeon(s): Dr. Kwan  Assistants: Dr. Brianna Aparicio    Preoperative Diagnoses:  1. Intrauterine pregnancy at 39+2 weeks  2. Bilateral hydrocephalus  3. Fetal macrosomia, macrocephaly  4. PUPPS      Postoperative diagnoses: Same    Procedure performed: Primary low segment transverse  section via Pfannenstiel incision with double layer uterine closure    Anesthesia: Spinal with duramorph  Est Blood Loss (mL): 1100 cc   Fluid replacement: see anesthesia record  Specimens: placenta and cord blood  Complications: None     Operative findings: normal uterus, tubes and ovaries.  The female infant had a notably large head c/w  testing, she was vigorous at time of delivery.  Extension of the hysterotomy into the right uterine artery require right [place] suture.       Indication: 32 year old,  who was admitted at 39+2 weeks for planned primary c/s d/t severe hydrocephalus.  The risks, benefits, and alternatives of  delivery were explained and the patient agreed to proceed.      Procedure details: After obtaining informed consent, the patient was taken to the operating room. She received Ancef prior to the skin incision. She was placed in the dorsal supine position with a leftward tilt and prepped and draped in the usual sterile fashion. Following test of adequate spinal anesthesia, the abdomen was entered through a transverse skin incision. The skin incision was made sharply and carried through the subcutaneous tissue to the fascia. Fascia was incised in the midline and extended laterally with the Churchill scissors. The superior margin of the fascial incision was grasped with Kocher clamps and dissected from the underlying muscle sharp and blunt dissecton, which was then repeated at the lower margin of the fascial incision. The muscle was  in the midline. The peritoneum was entered bluntly and the opening extended by digital dissection with care to avoid the  bladder. A bladder blade was placed. The lower segment of the uterus was opened sharply in a transverse fashion and extended with digital pressure. The infant's head was elevated to the level of the hysterotomy and was delivered atraumatically. The cord was doubly clamped and cut and the infant was handed off to the waiting NICU staff after 1 minute delayed cord clamping. A segment of the cord was cut and set aside for cord gases if needed. The placenta was removed with gentle cord traction and was noted to be fully removed, intact. The uterus was exteriorized from the abdomen and cleared of all clots and debris.Oxytocin was given through the running IV. With vigorous massage as well as administration of oxytocin, good uterine tone was achieved. The hysterotomy was repaired with 0-vicryl suture in a running locked fashion. A 2nd layer of 0-monocryl was used to imbricate the incision. A 3 cm hematoma was noted at the right most inferior edge of the hysterotomy, it was stable at time of closure. The posterior cul-de-sac was suctioned and the uterus was returned to the abdomen. The bilateral pericolic gutters were also suctioned. The hysterotomy was again inspected and found to have no active sites of bleeding. The abdominal wall was examined and found to have no active sites of bleeding.  The fascia was closed with a running suture of 0-vicryl. Subcutaneous tissue was irrigated. Areas that were oozing were controlled with cautery. The subcutaneous tissue was less than 3cm in depth and did not require reapproximation. The skin was closed with 4-0 monocryl. The incision was covered with steristrips and covered with a pressure dressing    The patient tolerated the procedure well and was taken to the recovery room in stable condition. All sponge, needle and instrument counts were correct x2. I was present for the entire procedure.     Luz Maria Kwan MD, FACOG  [facility]  Staff  OB/GYN    7/3/2017  2:43 PM

## 2017-07-03 NOTE — PLAN OF CARE
Problem: Perioperative Period (Adult)  Goal: Signs and Symptoms of Listed Potential Problems Will be Absent or Manageable (Perioperative Period)  Signs and symptoms of listed potential problems will be absent or manageable by discharge/transition of care (reference Perioperative Period (Adult) CPG).   Outcome: Improving  Data: Patient admitted to room Triage 2 at 0810. Patient is a . Prenatal record reviewed.   Obstetric History       T2      L1     SAB0   TAB0   Ectopic0   Multiple0   Live Births1        # Outcome Date GA Lbr Amador/2nd Weight Sex Delivery Anes PTL Lv   2 Term 17 39w2d     F              Name: NEIL CHIANG,BABYFrancisco BENITEZPEPPER      Apgar1:  8                Apgar5: 9   1 Term 08 40w0d   2.722 kg (6 lb) F      SUSHILA       .  Medical History:   Past Medical History:   Diagnosis Date     NO ACTIVE PROBLEMS       Wounds and injuries       Fell @ 19 wks, went to [facility]   .  Gestational age 39w2d. Vital signs per doc flowsheet. Fetal movement present. Patient reports Scheduled  Section (primary c/s for fetal bilateral cerebral hydrocephalus - 3rd ventrical)   as reason for admission. Support persons are present.  Action: Care of patient assumed at 0810. Verbal consent for EFM, external fetal monitors applied. Admission assessment completed. Patient and support persons educated on labor process. Patient instructed to report change in fetal movement, contractions, vaginal leaking of fluid or bleeding, abdominal pain, or any concerns related to the pregnancy to her nurse/physician. Patient oriented to room, call light in reach.   Response: Dr. Kwan informed of patient arrival. Plan per provider is scheduled . Patient verbalized understanding of education and verbalized agreement with plan. Patient coping with labor via coping with labor algorithm.  Patient is not laboring.

## 2017-07-03 NOTE — PLAN OF CARE
Problem: Perioperative Period (Adult)  Goal: Signs and Symptoms of Listed Potential Problems Will be Absent or Manageable (Perioperative Period)  Signs and symptoms of listed potential problems will be absent or manageable by discharge/transition of care (reference Perioperative Period (Adult) CPG).   Outcome: Adequate for Discharge Date Met:  17  Pt stable through PACU recovery.  Postpartum checks wdl, VSS.  Denied pain.  Tolerated sips of water.  Transferred to NICU via gurney to visit  Dina at 1630, then on to room 7125 at 1655.  Pt tolerated transfer well, denied pain or nausea.  Report given to FRANCOISE Sullivan upon arrival to unit at 1705.

## 2017-07-03 NOTE — ANESTHESIA PREPROCEDURE EVALUATION
Anesthesia Evaluation     .             ROS/MED HX    ENT/Pulmonary:  - neg pulmonary ROS     Neurologic:  - neg neurologic ROS     Cardiovascular:  - neg cardiovascular ROS       METS/Exercise Tolerance:     Hematologic:  - neg hematologic  ROS       Musculoskeletal:  - neg musculoskeletal ROS       GI/Hepatic:  - neg GI/hepatic ROS       Renal/Genitourinary:  - ROS Renal section negative       Endo:  - neg endo ROS       Psychiatric:  - neg psychiatric ROS       Infectious Disease:  - neg infectious disease ROS       Malignancy:      - no malignancy   Other: Comment: 39+2 w GA, pregnancy complicated by fetal hydrocephalus and macrocephaly, PUPPS. Maternal    (+) Possibly pregnant C-spine cleared: Yes, no H/O Chronic Pain,no other significant disability                  ANESTHESIA PREOP EVALUATION    Procedure: Procedure(s):  Primary  Section  - Wound Class: II-Clean Contaminated    HPI: Jo Ann Patel is a 32 year old female who is presenting for above stated procedure.    PMHx/PSHx/ROS:  Past Medical History:   Diagnosis Date     NO ACTIVE PROBLEMS      Wounds and injuries     Fell @ 19 wks, went to [facility], stable       Past Surgical History:   Procedure Laterality Date     APPENDECTOMY         ROS as stated above    Soc Hx:   Social History   Substance Use Topics     Smoking status: Never Smoker     Smokeless tobacco: Never Used     Alcohol use No       Allergies: No Known Allergies    Meds:   Prescriptions Prior to Admission   Medication Sig Dispense Refill Last Dose     Prenat w/o R-OM-Uzkcrym-FA-DHA (ZATEAN-PN DHA) 27-0.6-0.4-300 MG CAPS    Past Week at Unknown time     pyridOXINE (VITAMIN B-6) 25 MG tablet Take 25 mg by mouth   Unknown at Unknown time       No current outpatient prescriptions on file.       Physical Exam:  VS: Temp:  [37.1  C (98.8  F)] 37.1  C (98.8  F)  Resp:  [18] 18    , Weight   Wt Readings from Last 2 Encounters:   17 68.9 kg (152 lb)   17 69.3 kg (152 lb  12.8 oz)       Labs:    BMP:  No results for input(s): NA, POTASSIUM, CHLORIDE, CO2, BUN, CR, GLC, TASHIA in the last 53649 hours.  LFTs:   No results for input(s): PROTTOTAL, ALBUMIN, BILITOTAL, ALKPHOS, AST, ALT, BILIDIRECT in the last 10060 hours.  CBC:   Recent Labs   Lab Test  07/03/17   0845   WBC  7.4   RBC  3.53*   HGB  10.8*   HCT  32.3*   MCV  92   MCH  30.6   MCHC  33.4   RDW  12.7   PLT  217     Coags:  No results for input(s): INR, PTT, FIBR in the last 13927 hours.                     Anesthesia Plan      History & Physical Review      ASA Status:  2 .    NPO Status:  > 6 hours    Plan for Spinal   PONV prophylaxis:  Ondansetron (or other 5HT-3)       Postoperative Care  Postoperative pain management:  IV analgesics, Oral pain medications and Multi-modal analgesia.      Consents  Anesthetic plan, risks, benefits and alternatives discussed with:  Patient.  Use of blood products discussed: Yes.   Use of blood products discussed with Patient.  Consented to blood products.  .        Patient discussed with Staff Anesthesiologist.    Maykel Perrin  Anesthesia Resident CA-1  Pager [phone]  July 3, 2017, 9:39 AM    I have personally seen and evaluated patient.  Plan for IT with Duramorph, TAP blocks with Exparel.    Talya Mcdaniels MD  July 3, 2017

## 2017-07-03 NOTE — ANESTHESIA POSTPROCEDURE EVALUATION
Patient: Jo Ann Patel    Procedure(s):  Primary  Section  - Wound Class: II-Clean Contaminated    Diagnosis:Bilateral Hydrocephalus   Diagnosis Additional Information: No value filed.    Anesthesia Type:  Spinal    Note:  Anesthesia Post Evaluation    Patient location: Obstetric PACU.  Patient participation: Able to fully participate in evaluation  Level of consciousness: awake and alert  Pain management: adequate  Airway patency: patent  Respiratory status: acceptable  Hydration status: acceptable  PONV: none     Anesthetic complications: None          Last vitals:  Vitals:    17 0916 17 0925 17 1001   BP: 135/82  149/85   Resp: 18     Temp: 37.1  C (98.8  F)     SpO2:  100%          Electronically Signed By: Dion Cruz MD, MD  July 3, 2017  1:47 PM

## 2017-07-03 NOTE — BRIEF OP NOTE
[facility] Obstetrics Brief Operative Note    Pre-operative diagnosis: Bilateral Hydrocephalus, 39 wks   Post-operative diagnosis: Same   Procedure: Primary low transverse  section   Surgeon: Luz Maria Kwan MD   Assistant(s): Brianna Aparicio MD   Anesthesia: Spinal anesthesia   Estimated blood loss: 1100   Total IV fluids: (See anesthesia record)   Blood transfusion: No transfusion was given during surgery   Total urine output: (See anesthesia record)   Drains: None   Specimens: placenta sent to pathology and cord blood   Findings: Live   Female  Cephalic presentation  Tubes: normal  Uterus: normal  Ovaries: normal   Complications: None   Condition: Neonatology consulted to assume care of the infant  Mother stable, transfered to post-anesthesia recovery   Comments: See dictated operative report for full details

## 2017-07-04 ENCOUNTER — OFFICE VISIT (OUTPATIENT)
Dept: INTERPRETER SERVICES | Facility: CLINIC | Age: 32
End: 2017-07-04

## 2017-07-04 LAB
ALBUMIN SERPL-MCNC: 2.1 G/DL (ref 3.4–5)
ALP SERPL-CCNC: 160 U/L (ref 40–150)
ALT SERPL W P-5'-P-CCNC: 12 U/L (ref 0–50)
ANION GAP SERPL CALCULATED.3IONS-SCNC: 8 MMOL/L (ref 3–14)
AST SERPL W P-5'-P-CCNC: 25 U/L (ref 0–45)
BASOPHILS # BLD AUTO: 0 10E9/L (ref 0–0.2)
BASOPHILS NFR BLD AUTO: 0.1 %
BILIRUB SERPL-MCNC: 0.3 MG/DL (ref 0.2–1.3)
BUN SERPL-MCNC: 10 MG/DL (ref 7–30)
CALCIUM SERPL-MCNC: 7.9 MG/DL (ref 8.5–10.1)
CHLORIDE SERPL-SCNC: 109 MMOL/L (ref 94–109)
CO2 SERPL-SCNC: 26 MMOL/L (ref 20–32)
CREAT SERPL-MCNC: 0.71 MG/DL (ref 0.52–1.04)
DIFFERENTIAL METHOD BLD: ABNORMAL
EOSINOPHIL # BLD AUTO: 0.1 10E9/L (ref 0–0.7)
EOSINOPHIL NFR BLD AUTO: 0.7 %
ERYTHROCYTE [DISTWIDTH] IN BLOOD BY AUTOMATED COUNT: 13.3 % (ref 10–15)
GFR SERPL CREATININE-BSD FRML MDRD: ABNORMAL ML/MIN/1.7M2
GLUCOSE SERPL-MCNC: 110 MG/DL (ref 70–99)
HCT VFR BLD AUTO: 20.7 % (ref 35–47)
HGB BLD-MCNC: 6.7 G/DL (ref 11.7–15.7)
HGB BLD-MCNC: 6.9 G/DL (ref 11.7–15.7)
IMM GRANULOCYTES # BLD: 0.2 10E9/L (ref 0–0.4)
IMM GRANULOCYTES NFR BLD: 1.3 %
LACTATE BLD-SCNC: 1 MMOL/L (ref 0.7–2.1)
LYMPHOCYTES # BLD AUTO: 1.9 10E9/L (ref 0.8–5.3)
LYMPHOCYTES NFR BLD AUTO: 12.7 %
MCH RBC QN AUTO: 30.9 PG (ref 26.5–33)
MCHC RBC AUTO-ENTMCNC: 33.3 G/DL (ref 31.5–36.5)
MCV RBC AUTO: 93 FL (ref 78–100)
MONOCYTES # BLD AUTO: 0.7 10E9/L (ref 0–1.3)
MONOCYTES NFR BLD AUTO: 4.8 %
NEUTROPHILS # BLD AUTO: 12.3 10E9/L (ref 1.6–8.3)
NEUTROPHILS NFR BLD AUTO: 80.4 %
NRBC # BLD AUTO: 0 10*3/UL
NRBC BLD AUTO-RTO: 0 /100
PLATELET # BLD AUTO: 203 10E9/L (ref 150–450)
POTASSIUM SERPL-SCNC: 4.2 MMOL/L (ref 3.4–5.3)
PROT SERPL-MCNC: 5.4 G/DL (ref 6.8–8.8)
RBC # BLD AUTO: 2.23 10E12/L (ref 3.8–5.2)
SODIUM SERPL-SCNC: 143 MMOL/L (ref 133–144)
WBC # BLD AUTO: 15.3 10E9/L (ref 4–11)

## 2017-07-04 PROCEDURE — S0077 INJECTION, CLINDAMYCIN PHOSP: HCPCS | Performed by: STUDENT IN AN ORGANIZED HEALTH CARE EDUCATION/TRAINING PROGRAM

## 2017-07-04 PROCEDURE — 36415 COLL VENOUS BLD VENIPUNCTURE: CPT | Performed by: STUDENT IN AN ORGANIZED HEALTH CARE EDUCATION/TRAINING PROGRAM

## 2017-07-04 PROCEDURE — 85018 HEMOGLOBIN: CPT | Performed by: OBSTETRICS & GYNECOLOGY

## 2017-07-04 PROCEDURE — 25000132 ZZH RX MED GY IP 250 OP 250 PS 637: Performed by: OBSTETRICS & GYNECOLOGY

## 2017-07-04 PROCEDURE — 36415 COLL VENOUS BLD VENIPUNCTURE: CPT | Performed by: OBSTETRICS & GYNECOLOGY

## 2017-07-04 PROCEDURE — 80053 COMPREHEN METABOLIC PANEL: CPT | Performed by: STUDENT IN AN ORGANIZED HEALTH CARE EDUCATION/TRAINING PROGRAM

## 2017-07-04 PROCEDURE — 12000030 ZZH R&B OB INTERMEDIATE UMMC

## 2017-07-04 PROCEDURE — 85025 COMPLETE CBC W/AUTO DIFF WBC: CPT | Performed by: STUDENT IN AN ORGANIZED HEALTH CARE EDUCATION/TRAINING PROGRAM

## 2017-07-04 PROCEDURE — 25000125 ZZHC RX 250: Performed by: STUDENT IN AN ORGANIZED HEALTH CARE EDUCATION/TRAINING PROGRAM

## 2017-07-04 PROCEDURE — 25000128 H RX IP 250 OP 636: Performed by: STUDENT IN AN ORGANIZED HEALTH CARE EDUCATION/TRAINING PROGRAM

## 2017-07-04 PROCEDURE — T1013 SIGN LANG/ORAL INTERPRETER: HCPCS | Mod: U3

## 2017-07-04 PROCEDURE — 87040 BLOOD CULTURE FOR BACTERIA: CPT | Performed by: STUDENT IN AN ORGANIZED HEALTH CARE EDUCATION/TRAINING PROGRAM

## 2017-07-04 PROCEDURE — 25000132 ZZH RX MED GY IP 250 OP 250 PS 637: Performed by: STUDENT IN AN ORGANIZED HEALTH CARE EDUCATION/TRAINING PROGRAM

## 2017-07-04 PROCEDURE — 25000128 H RX IP 250 OP 636: Performed by: OBSTETRICS & GYNECOLOGY

## 2017-07-04 PROCEDURE — 83605 ASSAY OF LACTIC ACID: CPT | Performed by: STUDENT IN AN ORGANIZED HEALTH CARE EDUCATION/TRAINING PROGRAM

## 2017-07-04 RX ORDER — IBUPROFEN 600 MG/1
600 TABLET, FILM COATED ORAL EVERY 6 HOURS PRN
Qty: 60 TABLET | Refills: 0 | Status: SHIPPED | OUTPATIENT
Start: 2017-07-04 | End: 2017-08-15

## 2017-07-04 RX ORDER — ACETAMINOPHEN 325 MG/1
325-650 TABLET ORAL EVERY 4 HOURS PRN
Qty: 100 TABLET | Refills: 0 | Status: SHIPPED | OUTPATIENT
Start: 2017-07-04 | End: 2017-08-15

## 2017-07-04 RX ORDER — AMOXICILLIN 250 MG
1-2 CAPSULE ORAL 2 TIMES DAILY
Qty: 100 TABLET | Refills: 0 | Status: SHIPPED | OUTPATIENT
Start: 2017-07-04 | End: 2017-08-15

## 2017-07-04 RX ORDER — FERROUS SULFATE 325(65) MG
325 TABLET ORAL 2 TIMES DAILY
Qty: 60 TABLET | Refills: 2 | Status: SHIPPED | OUTPATIENT
Start: 2017-07-04 | End: 2017-08-15

## 2017-07-04 RX ORDER — SODIUM CHLORIDE, SODIUM LACTATE, POTASSIUM CHLORIDE, CALCIUM CHLORIDE 600; 310; 30; 20 MG/100ML; MG/100ML; MG/100ML; MG/100ML
INJECTION, SOLUTION INTRAVENOUS CONTINUOUS
Status: DISCONTINUED | OUTPATIENT
Start: 2017-07-04 | End: 2017-07-06 | Stop reason: HOSPADM

## 2017-07-04 RX ORDER — CLINDAMYCIN PHOSPHATE 900 MG/50ML
900 INJECTION, SOLUTION INTRAVENOUS EVERY 8 HOURS
Status: COMPLETED | OUTPATIENT
Start: 2017-07-04 | End: 2017-07-05

## 2017-07-04 RX ORDER — GENTAMICIN SULFATE 100 MG/100ML
100 INJECTION, SOLUTION INTRAVENOUS EVERY 8 HOURS
Status: DISCONTINUED | OUTPATIENT
Start: 2017-07-05 | End: 2017-07-05

## 2017-07-04 RX ORDER — OXYCODONE HYDROCHLORIDE 5 MG/1
5 TABLET ORAL EVERY 4 HOURS PRN
Qty: 30 TABLET | Refills: 0 | Status: SHIPPED | OUTPATIENT
Start: 2017-07-04 | End: 2017-08-15

## 2017-07-04 RX ADMIN — KETOROLAC TROMETHAMINE 30 MG: 30 INJECTION, SOLUTION INTRAMUSCULAR at 01:55

## 2017-07-04 RX ADMIN — KETOROLAC TROMETHAMINE 30 MG: 30 INJECTION, SOLUTION INTRAMUSCULAR at 14:18

## 2017-07-04 RX ADMIN — CLINDAMYCIN PHOSPHATE 900 MG: 18 INJECTION, SOLUTION INTRAVENOUS at 22:40

## 2017-07-04 RX ADMIN — ACETAMINOPHEN 650 MG: 325 TABLET, FILM COATED ORAL at 22:46

## 2017-07-04 RX ADMIN — SENNOSIDES AND DOCUSATE SODIUM 2 TABLET: 8.6; 5 TABLET ORAL at 08:37

## 2017-07-04 RX ADMIN — SODIUM CHLORIDE, POTASSIUM CHLORIDE, SODIUM LACTATE AND CALCIUM CHLORIDE 1000 ML: 600; 310; 30; 20 INJECTION, SOLUTION INTRAVENOUS at 22:23

## 2017-07-04 RX ADMIN — IBUPROFEN 800 MG: 400 TABLET ORAL at 19:57

## 2017-07-04 RX ADMIN — KETOROLAC TROMETHAMINE 30 MG: 30 INJECTION, SOLUTION INTRAMUSCULAR at 08:37

## 2017-07-04 RX ADMIN — GENTAMICIN SULFATE 140 MG: 40 INJECTION, SOLUTION INTRAMUSCULAR; INTRAVENOUS at 23:46

## 2017-07-04 NOTE — PLAN OF CARE
Problem: Postpartum ( Delivery) (Adult)  Goal: Signs and Symptoms of Listed Potential Problems Will be Absent or Manageable (Postpartum)  Signs and symptoms of listed potential problems will be absent or manageable by discharge/transition of care (reference Postpartum ( Delivery) (Adult) CPG).   Outcome: Improving  Vital signs within normal limits. Postpartum checks within normal limits - see flow record. Patient eating and drinking normally. Pascal removed at 0636 and is up ambulating. No apparent signs of infection. Performing self cares. Pumping and visiting NICU to breastfeed.  present and very supportive. Will continue with plan of care.

## 2017-07-04 NOTE — PLAN OF CARE
Problem: Goal Outcome Summary  Goal: Goal Outcome Summary  Outcome: Improving  Pt arrived to room 7125 at 1710 via cart accompanied by , Valentino.  Oriented to room and unit.  VSS and postpartum assessments WDL.  Denies pain, administered scheduled toradol per MAR.  Provided education and assistance with pumping for infant in NICU.   present and supportive.  Pt up to wheelchair with stand-by-assist and down to NICU to feed infant, also brought pump supplies.  Will continue with postpartum cares and education per plan of care.

## 2017-07-04 NOTE — PLAN OF CARE
Problem: Goal Outcome Summary  Goal: Goal Outcome Summary  Outcome: Improving  Vss, postpartum assessment WDL. Taking toradol for pain, tylenol and oxycodone offered and patient declined. Voided and emptied after santiago removal. Patient says she is not light headed or dizzy when out of bed, Hgb 6.7 this morning, resident aware. Using breast pump, breast feeding infant in NICU.

## 2017-07-04 NOTE — PROVIDER NOTIFICATION
Text-paged G2 for medication request:  I don't see any Tylenol orders on patient's MAR.  She is getting her scheduled toradol and denies pain but a PRN order would be nice if she needs.  Thanks

## 2017-07-04 NOTE — DISCHARGE SUMMARY
[facility] Discharge Summary    Jo Ann Patel MRN# [id number]   Age: 32 year old YOB: 1985     Date of Admission:  7/3/2017  Date of Discharge:  2017  Admitting Physician:  Luz Maria Kwan MD  Discharge Physician:  Nicolasa Lindsey MD    Admit Dx:   -  at 39w2d  - Fetal hydrocephalus, macrocephaly and macrosomia  - PUPPS  - Bilateral LE edema    Discharge Dx:  - , s/p primary low transverse  section  - Same as above  - acute blood loss anemia, surgical, expected on chronic anemia    Procedures:  - Primary low transverse  section with 2 layer uterine closure via Pfannenstiel incision  - Spinal analgesia  - TAP blocks  - IV iron x1     Admit HPI:  Ms. oJ Ann Patel is a 32 year old , at 39w2d by LMP c/w 6w1d US, who presents for scheduled primary  due to fetal hydrocephalus and macrocephaly. Discussed risks of surgery including bleeding requiring uterotonics, transfusion or additional procedures; injury to bowel, bladder, blood vessels, nerves, uterus, fallopian tubes, ovaries, baby; postoperative complications including infection, pain and DVT/PE. Consent signed. All questions answered. We did discuss classical  and possible vertical midline incision as well.    Please see her admit H&P for full details of her PMH, PSH, Meds, Allergies and exam on admit.    Operative Course:  Surgery was uncomplicated. EBL from the delivery was 1100cc. Please see her  Section Operative Note for full details regarding her delivery.    Operative Findings: normal uterus, tubes and ovaries.  The female infant had a notably large head c/w  testing, she was vigorous at time of delivery.  Extension of the hysterotomy into the right uterine artery require right [place] suture.     Postoperative Course:  Her postoperative course was complicated by a fever on 7/3 to a Tmax of 2148 and suspected  endometritis. Her last fever was 101.0 on 7/3 at 2247. She received 24 hours of amp/gent/clinda with resolution of her chills, fevers and fundal tenderness. She also had a hemoglobin of 6.7 after delivery and declined a blood transfusion. She did receive IV iron x1. On POD#3, she was meeting all of her postpartum goals and deemed stable for discharge. She was voiding without difficulty, tolerating a regular diet without nausea and vomiting, her pain was well controlled on oral pain medicines and her lochia was appropriate. Her Rh status was positive and Rhogam was not indicated.    Discharge Medications:     Review of your medicines      START taking       Dose / Directions    acetaminophen 325 MG tablet   Commonly known as:  TYLENOL        Dose:  325-650 mg   Take 1-2 tablets (325-650 mg) by mouth every 4 hours as needed for mild pain or fever   Quantity:  100 tablet   Refills:  0       ferrous sulfate 325 (65 FE) MG tablet   Commonly known as:  IRON   Used for:  Anemia due to blood loss, acute        Dose:  325 mg   Take 1 tablet (325 mg) by mouth 2 times daily   Quantity:  60 tablet   Refills:  2       ibuprofen 600 MG tablet   Commonly known as:  ADVIL/MOTRIN        Dose:  600 mg   Take 1 tablet (600 mg) by mouth every 6 hours as needed for moderate pain   Quantity:  60 tablet   Refills:  0       oxyCODONE 5 MG IR tablet   Commonly known as:  ROXICODONE        Dose:  5 mg   Take 1 tablet (5 mg) by mouth every 4 hours as needed for pain   Quantity:  30 tablet   Refills:  0       senna-docusate 8.6-50 MG per tablet   Commonly known as:  SENOKOT-S;PERICOLACE        Dose:  1-2 tablet   Take 1-2 tablets by mouth 2 times daily   Quantity:  100 tablet   Refills:  0         CONTINUE these medicines which have NOT CHANGED       Dose / Directions    pyridOXINE 25 MG tablet   Commonly known as:  vitamin B-6        Dose:  25 mg   Take 25 mg by mouth   Refills:  0       ZATEAN-PN DHA 27-0.6-0.4-300 MG Caps        Refills:  0             Where to get your medicines      These medications were sent to [facility] [place], [place] - [address]  [address] [facility] [address], [facility] [address]     Phone:  [phone]      acetaminophen 325 MG tablet     ferrous sulfate 325 (65 FE) MG tablet     ibuprofen 600 MG tablet     senna-docusate 8.6-50 MG per tablet         Some of these will need a paper prescription and others can be bought over the counter. Ask your nurse if you have questions.     Bring a paper prescription for each of these medications      oxyCODONE 5 MG IR tablet           Discharge/Disposition:  Jo Ann Patel was discharged to home in stable condition with the following instructions/medications:  1) Call for temperature > 100.4, bright red vaginal bleeding >1 pad an hour x 2 hours, foul smelling vaginal discharge, pain not controlled by usual oral pain meds, persistent nausea and vomiting not controlled on medications, drainage or redness from incision site  2) She desired Nexplanon for contraception.  3) For feeding she decided to breast feed.  4) She was instructed to follow-up with her primary OB in 6 weeks for a routine postpartum visit  5) Discharge activity:  No heavy lifting >15 lbs or strenuous activity for 6 weeks, pelvic rest for 6 weeks, no driving or operating machinery while on narcotics.    Daya Booker MD  Obstetrics and Gynecology, PGY-2  Staff MD Note    I evaluated the patient on the day of discharge.  We reviewed discharge instructions.  Patient stable for discharge.    Nicolasa Lindsey MD

## 2017-07-04 NOTE — PROVIDER NOTIFICATION
07/04/17 1000   Provider Notification   Provider Name/Title Dr. Flowers, G2   Method of Notification Electronic Page   Request Evaluate in Person   Notification Reason Lab Results  (low hgb 6.7)   the resident called back and said will continue to monitor and no interventions for as the patient is asymptomatic.

## 2017-07-04 NOTE — PROGRESS NOTES
[facility] Postpartum Rounding Note    S: Patient reports that she is feeling well this morning.  She has walked to the bathroom without any dizziness.  She is tolerating regular diet without nausea or vomiting, lochia is similar to a menses, she has not yet voided after the santiago catheter was removed.  She has not yet passed flatus    O:  Vitals:    17 1810 17 1906 17 2011 17 2111   BP: 127/86 133/86 136/89 138/89   Resp: 16 16 18 16   Temp: 98  F (36.7  C) 98.7  F (37.1  C) 98.7  F (37.1  C) 98.6  F (37  C)   TempSrc: Oral Oral Oral Oral   SpO2: 100% 100% 100% 100%   Weight:       Height:         Gen: Resting in bed, NAD  CV: RRR, extremities warm and well perfused  Lungs: lungs clear to auscultation bilaterally   Abd: Soft, nondistended, fundus 1cm below the umbilicus  Incision: pfannenstiel skin incision with overlying ABD in place which is clean, dry, intact, no erythema or oozing  Ext: non-tender, trace edema, no erythema    A: 32 year old  POD#1 s/p PLTCS for fetal hydrocephalus. Pregnancy complicated by fetal macrocephaly and macrosomia, PUPPS and b/l LE edema.     P:   1. Routine post-operative care.  S/p santiago removal.  Awaiting TOV. ambulate QID, ADAT, encourage IS use.  2. Heme: 10.8> EBL 1100>AM pending  3. Rh positive, no Rhogam indicated. Rubella immune  4. Pain well controlled on oral tylenol, rosa and toradol.  5. Infant: per mom doing well in NICU  6. Birth control: unsure, would like more information     Anticipate discharge home POD#2-3    Angeline Card MD  OBGYN PGY3

## 2017-07-04 NOTE — LACTATION NOTE
D:  I met with Jo Ann x2 today.  Both times parents declined , preferring that Dad interpret those things she did not fully understand.  Dina is her second baby, she  her first for one year.  Jo Ann is normally in good health, takes no medications, and has no history of breast/chest surgery or trauma. She has already started to pump.    I:  I gave her a folder of introductory materials in [place] and went over pumping guidelines.  I explained that she should pump unless Dina is able to suck strongly about 20 minutes per time.  She has only pumped once today, and I recommended she stay on more of a schedule until baby is consistent.    We talked about hands on pumping techniques, hand expression and how to access the [place] websites. I told her we could help her obtain a pump to use at discharge.  We met later at bedside, baby was latched and sucking but it was painful.  She had stayed in a wheelchair, which I told her was the most difficult place to breastfeed.  She will move to a chair next time.  We tried first a cross cradle, then an underarm hold to get Dina's latch to be more asymmetric.  She preferred the latter, as it was also comfortable for her tummy.  A:  Experienced mom, working on breastfeeding and pumping for her new baby.  P:  Will continue to provide lactation support.    Luba Baker, RNC, IBCLC

## 2017-07-05 ENCOUNTER — OFFICE VISIT (OUTPATIENT)
Dept: INTERPRETER SERVICES | Facility: CLINIC | Age: 32
End: 2017-07-05

## 2017-07-05 LAB
ALBUMIN UR-MCNC: NEGATIVE MG/DL
APPEARANCE UR: CLEAR
BILIRUB UR QL STRIP: NEGATIVE
COLOR UR AUTO: ABNORMAL
GLUCOSE UR STRIP-MCNC: NEGATIVE MG/DL
HGB UR QL STRIP: ABNORMAL
KETONES UR STRIP-MCNC: NEGATIVE MG/DL
LEUKOCYTE ESTERASE UR QL STRIP: NEGATIVE
MUCOUS THREADS #/AREA URNS LPF: PRESENT /LPF
NITRATE UR QL: NEGATIVE
PH UR STRIP: 7.5 PH (ref 5–7)
RBC #/AREA URNS AUTO: 21 /HPF (ref 0–2)
SP GR UR STRIP: 1 (ref 1–1.03)
SQUAMOUS #/AREA URNS AUTO: <1 /HPF (ref 0–1)
URN SPEC COLLECT METH UR: ABNORMAL
UROBILINOGEN UR STRIP-MCNC: NORMAL MG/DL (ref 0–2)
WBC #/AREA URNS AUTO: 1 /HPF (ref 0–2)

## 2017-07-05 PROCEDURE — 25000125 ZZHC RX 250: Performed by: STUDENT IN AN ORGANIZED HEALTH CARE EDUCATION/TRAINING PROGRAM

## 2017-07-05 PROCEDURE — 25000128 H RX IP 250 OP 636: Performed by: STUDENT IN AN ORGANIZED HEALTH CARE EDUCATION/TRAINING PROGRAM

## 2017-07-05 PROCEDURE — 12000028 ZZH R&B OB UMMC

## 2017-07-05 PROCEDURE — 25000128 H RX IP 250 OP 636: Performed by: OBSTETRICS & GYNECOLOGY

## 2017-07-05 PROCEDURE — S0077 INJECTION, CLINDAMYCIN PHOSP: HCPCS | Performed by: STUDENT IN AN ORGANIZED HEALTH CARE EDUCATION/TRAINING PROGRAM

## 2017-07-05 PROCEDURE — 25000132 ZZH RX MED GY IP 250 OP 250 PS 637: Performed by: OBSTETRICS & GYNECOLOGY

## 2017-07-05 PROCEDURE — 25000132 ZZH RX MED GY IP 250 OP 250 PS 637: Performed by: STUDENT IN AN ORGANIZED HEALTH CARE EDUCATION/TRAINING PROGRAM

## 2017-07-05 PROCEDURE — T1013 SIGN LANG/ORAL INTERPRETER: HCPCS | Mod: U3

## 2017-07-05 PROCEDURE — 81001 URINALYSIS AUTO W/SCOPE: CPT | Performed by: STUDENT IN AN ORGANIZED HEALTH CARE EDUCATION/TRAINING PROGRAM

## 2017-07-05 RX ORDER — METHYLPREDNISOLONE SODIUM SUCCINATE 125 MG/2ML
125 INJECTION, POWDER, LYOPHILIZED, FOR SOLUTION INTRAMUSCULAR; INTRAVENOUS
Status: DISCONTINUED | OUTPATIENT
Start: 2017-07-05 | End: 2017-07-05

## 2017-07-05 RX ORDER — DIPHENHYDRAMINE HYDROCHLORIDE 50 MG/ML
50 INJECTION INTRAMUSCULAR; INTRAVENOUS
Status: DISCONTINUED | OUTPATIENT
Start: 2017-07-05 | End: 2017-07-05

## 2017-07-05 RX ADMIN — ACETAMINOPHEN 650 MG: 325 TABLET, FILM COATED ORAL at 16:28

## 2017-07-05 RX ADMIN — IBUPROFEN 800 MG: 400 TABLET ORAL at 10:36

## 2017-07-05 RX ADMIN — GENTAMICIN SULFATE 100 MG: 100 INJECTION, SOLUTION INTRAVENOUS at 16:59

## 2017-07-05 RX ADMIN — SODIUM CHLORIDE 3 MILLION UNITS: 9 INJECTION, SOLUTION INTRAVENOUS at 04:56

## 2017-07-05 RX ADMIN — SODIUM CHLORIDE 3 MILLION UNITS: 9 INJECTION, SOLUTION INTRAVENOUS at 22:02

## 2017-07-05 RX ADMIN — SODIUM CHLORIDE 3 MILLION UNITS: 9 INJECTION, SOLUTION INTRAVENOUS at 09:17

## 2017-07-05 RX ADMIN — IBUPROFEN 800 MG: 400 TABLET ORAL at 23:31

## 2017-07-05 RX ADMIN — GENTAMICIN SULFATE 100 MG: 100 INJECTION, SOLUTION INTRAVENOUS at 07:31

## 2017-07-05 RX ADMIN — CLINDAMYCIN PHOSPHATE 900 MG: 18 INJECTION, SOLUTION INTRAVENOUS at 14:16

## 2017-07-05 RX ADMIN — CLINDAMYCIN PHOSPHATE 900 MG: 18 INJECTION, SOLUTION INTRAVENOUS at 06:29

## 2017-07-05 RX ADMIN — SODIUM CHLORIDE, POTASSIUM CHLORIDE, SODIUM LACTATE AND CALCIUM CHLORIDE: 600; 310; 30; 20 INJECTION, SOLUTION INTRAVENOUS at 02:43

## 2017-07-05 RX ADMIN — SODIUM CHLORIDE 3 MILLION UNITS: 9 INJECTION, SOLUTION INTRAVENOUS at 01:05

## 2017-07-05 RX ADMIN — SENNOSIDES AND DOCUSATE SODIUM 2 TABLET: 8.6; 5 TABLET ORAL at 09:20

## 2017-07-05 RX ADMIN — SODIUM CHLORIDE 3 MILLION UNITS: 9 INJECTION, SOLUTION INTRAVENOUS at 13:04

## 2017-07-05 RX ADMIN — IRON SUCROSE 200 MG: 20 INJECTION, SOLUTION INTRAVENOUS at 10:32

## 2017-07-05 RX ADMIN — SODIUM CHLORIDE 3 MILLION UNITS: 9 INJECTION, SOLUTION INTRAVENOUS at 18:18

## 2017-07-05 NOTE — PHARMACY-AMINOGLYCOSIDE DOSING SERVICE
Pharmacy Aminoglycoside Initial Note  Date of Service 2017  Patient's  1985  32 year old, female    Weight (Adjusted):  57.6 kg (Actual weight is 68.9 kg, ideal body weight 50.1 kg) - Notably patient delivered her baby    Indication: Sepsis    Current estimated CrCl = Estimated Creatinine Clearance: 103.4 mL/min (based on Cr of 0.71).    Creatinine for last 3 days  2017: 10:15 PM Creatinine 0.71 mg/dL     Nephrotoxins and other renal medications (Future)    Start     Dose/Rate Route Frequency Ordered Stop    17 0700  gentamicin (GARAMYCIN) infusion 100 mg      100 mg  over 60 Minutes Intravenous EVERY 8 HOURS 17 2316      17 2300  penicillin G potassium 3 Million Units in NaCl 0.9 % 100 mL intermittent infusion      3 Million Units Intravenous EVERY 4 HOURS 17 2230  gentamicin (GARAMYCIN) 140 mg in NaCl 0.9 % 50 mL intermittent infusion      2 mg/kg × 68.9 kg  over 60 Minutes Intravenous ONCE 17 0000  ibuprofen (ADVIL/MOTRIN) 600 MG tablet      600 mg Oral EVERY 6 HOURS PRN 17 0210      17 1745  ibuprofen (ADVIL/MOTRIN) tablet 400-800 mg      400-800 mg Oral EVERY 6 HOURS PRN 17 1745            Contrast Orders - past 72 hours     None          Aminoglycoside Levels - past 2 days  No results found for requested labs within last 48 hours.    Aminoglycosides IV Administrations (past 72 hours)      No aminoglycosides orders with administrations in past 72 hours.                    Plan:  1.  Start Gentamicin 140 mg IV x 1 load followed by gentamicin 100 mg IV q8h.   2.  Target goals based on conventional dosing  3.  Goal peak level: 8-10 mg/L  4.  Goal trough level: <1 mg/L  5.  Pharmacy will continue to follow and check levels as appropriate in 1-3 Days    Sadie Platt PharmD

## 2017-07-05 NOTE — PLAN OF CARE
Problem: Goal Outcome Summary  Goal: Goal Outcome Summary  Outcome: Improving  Data: Vital signs within normal limits. Fever resolved. Declined chills, dizzy spells, hot flashes overnight. Postpartum checks within normal limits - see flow record. Patient able to empty bladder independently and is up ambulating. Incision healing well. Uterus is not abnormally tender so it seems. Patient performing self cares and rested most of the night. Did not pump overnight, encouraged to do so frequently during the day.   Action: Patient declined pain relief medication overnight but understands that it is available.  Response: Seems to be coping per expected with  in NICU. , Noah, is present and supportive.

## 2017-07-05 NOTE — PLAN OF CARE
Problem: Goal Outcome Summary  Goal: Goal Outcome Summary  Patient received Iron infusion today. Tolerated infusion well. Patient has started thinking of possibility of getting blood. Patient denies any dizziness or lightheadedness when up ambulating. Patient has also remained afebrile and continues on triple antibiotics. Will continue to monitor and with plan of care.

## 2017-07-05 NOTE — PLAN OF CARE
Problem: Goal Outcome Summary  Goal: Goal Outcome Summary  Outcome: Improving  VSS and postpartum assessments WDL.  Up ad darcy with steady gait.  Independent with cares, showered this evening.  Going down to NICU to visit and feed infant, also pumping.  Pt denies pain.  PIV saline locked.  , Noah present and supportive.  Will continue with postpartum cares and education per plan of care.

## 2017-07-05 NOTE — PROVIDER NOTIFICATION
Phone-paged G2 with pt status update regarding pt's decision to proceed with iron infusion.  Will also inform of current vitals.

## 2017-07-05 NOTE — PLAN OF CARE
Update:  Pt returned to room from NICU visit around 1900, she pumped and then went to the bathroom and began shaking and feeling cold.  Upon this writer entering room, pt was visably shaking.  Checked vitals, assessed incision, and notified resident who came to bedside to assess pt and discuss blood transfusion / IV iron for low Hgb.  Pt and  took some time to discuss and notified this writer that they wish to proceed with IV iron.  Rechecked vitals which triggered sepsis protocol.  Notified resident who placed orders for labs, LR bolus, and to begin IV antibiotics per MAR.  Resident will visit pt at bedside again as soon as able to talk with patient and .

## 2017-07-05 NOTE — PROVIDER NOTIFICATION
"Text-paged G2 with pt status update:  Pt c/o feeling cold and started visably shaking.  Hgb this morning=6.7.    Current vitals:  Temp=100.2  WG=688/85  HR=96  RR=18  JJH9=935  Pt denies dizziness, lightheaded, states she just \"feels cold\".  "

## 2017-07-05 NOTE — PROGRESS NOTES
[facility] Interval Progress Note    S: Called by RN due to patient feeling cold and shaky. Patient interviewed with professional .     Patient states she went to the bathroom and then felt cold and shaky. Denies headache, chest pain, shortness of breath. Pumping while baby in NICU, making some colostrum but doesn't feel like it's enough. Denies dizziness while laying in bed or standing up walking.       O:  Patient Vitals for the past 8 hrs:   BP Temp Temp src Heart Rate Resp SpO2   17 143/85 100.2  F (37.9  C) Oral - 18 100 %   17 1535 120/71 98.4  F (36.9  C) Oral 88 16 -     General: Laying in bed, covered by blankets  CV: Tachycardic heart rate with regular rhythm, flow murmur present  Resp: Lungs CTAB, regular respiratory rate  Abdomen: soft, nondistended, appropriately tender to palpation, fundus not tender out of proportion to lower abdomen, no guarding or rebound tenderness  Pfannenstiel incision: steri-strips in place, dry, intact, no discharge   Extremities: +1 edema bilaterally, symmetric, nontender, no erythema    A/P:   33 yo  POD#1 s/p PLTCS for fetal hydrocephaly. Patient with temperature of 100.2, no fevers yet. No focal signs of infection. Discussed chills may be due to low hemoglobin of 6.7. Recommended blood transfusion for symptoms and to assist in establishing milk supply. Also discussed IV iron as an alterative or adjuvant therapy. Risks, benefits and alternatives to each discussed including risk of allergic reaction and infection.     - Continue to monitor vital signs, if fever develops will get UA and start empiric antibiotics  - Patient and  would like to further discuss blood transfusion versus IV iron. They will inform their RN of their decision.   - If they decide against both a blood transfusion and an iron infusion will get a repeat hemoglobin karis Booker MD  Obstetrics and Gynecology, PGY-2    Addendum:    Pt met sepsis  protocol due to tachycardia and persistent fever. OB sepsis protocol order set placed. WBC elevated at 15.3 with a left shift. Lactic acid WNL. Blood cultures pending. UA collected after starting antibiotics not suspicious for UTI. Gent/clinda/pencillin started per sepsis protocol. Suspect endometritis as likely cause. Continue IV abx for 24 hours.     Daya Booker MD  Obstetrics and Gynecology, PGY-2  3:57 AM

## 2017-07-05 NOTE — PROGRESS NOTES
[facility] Postpartum Rounding Note    Pt seen with a professional .     S: Patient reports that she is feeling much better this AM. No longer having chills/shakes. Ambulating w/o dizziness. Eating/drinking w/o nausea/vomiting. Lochia is similar to a menses. Voiding w/o difficulty. Pumping with a small amount of colostrum production.     O:  Vitals:    17 2247 17 2300 17 0459 17 0733   BP: 128/79 125/82  139/84   Pulse:       Resp:    Temp: 101  F (38.3  C) 99.1  F (37.3  C) 97.8  F (36.6  C) 97.9  F (36.6  C)   TempSrc: Oral Oral Oral Oral   SpO2: 98% 98%  100%   Weight:       Height:         Gen: Resting in bed, NAD  CV: RRR, extremities warm and well perfused  Lungs: lungs clear to auscultation bilaterally   Abd: Soft, nondistended, fundus 1cm below the umbilicus, nontender fundus  Incision: pfannenstiel skin incision with steri-strips in place clean, dry, intact, no erythema or oozing  Ext: non-tender, +1 edema, no erythema      Intake/Output Summary (Last 24 hours) at 17 0754  Last data filed at 17 1500   Gross per 24 hour   Intake              450 ml   Output             1600 ml   Net            -1150 ml       A: 32 year old  POD#2 s/p PLTCS for fetal hydrocephalus. Patient developed fever and met sepsis criteria last night, treating for suspected endometritis. Pregnancy complicated by fetal macrocephaly and macrosomia, PUPPS and b/l LE edema.     WBC 15.3  ANC 12.3   Hgb 6.9  Plts 203  UA neg LE and nitrite  BCx pending    #Acute blood loss anemia  - Heme: 10.8> EBL 1100>6.9  - Discussed IV iron and blood transfusion with patient, patient agreeable to IV iron   - IV iron this AM    #Suspected endometritis  - Gent/clinda/pcn started at 2240  - Continue IV abx for 24 hours  - Afebrile since   - No signs of pulm infection, DVT or UTI  - BCx pending    #Post-op/partum care  - Routine post-operative care.   - Rh positive, no Rhogam indicated.  Rubella immune  - Pain well controlled on oral tylenol, rosa and toradol.  - Infant: per mom doing well in NICU  - Birth control: Nexplanon at PP    Anticipate discharge home POD#3    Daya Booker MD  Obstetrics and Gynecology, PGY-2    Appears well.   Abdomen: soft, NT, ND. Fundus firm and tender with palpation.  Incision: CDI   LE without edema or erythema    A: POD 2, PP endometritis on abx, acute blood loss anemia superimposed on chronic anemia  P: continue IV abx until 24 hours afebrile: patient understands plan  Consider d/c to home when afeb 24-48 hours and stable  IV iron ordered    Geeta Flowers

## 2017-07-06 ENCOUNTER — LACTATION ENCOUNTER (OUTPATIENT)
Age: 32
End: 2017-07-06

## 2017-07-06 VITALS
SYSTOLIC BLOOD PRESSURE: 136 MMHG | HEIGHT: 62 IN | WEIGHT: 152 LBS | HEART RATE: 80 BPM | TEMPERATURE: 98.3 F | BODY MASS INDEX: 27.97 KG/M2 | OXYGEN SATURATION: 98 % | DIASTOLIC BLOOD PRESSURE: 92 MMHG | RESPIRATION RATE: 16 BRPM

## 2017-07-06 PROCEDURE — T1013 SIGN LANG/ORAL INTERPRETER: HCPCS | Mod: U3

## 2017-07-06 PROCEDURE — 25000132 ZZH RX MED GY IP 250 OP 250 PS 637: Performed by: STUDENT IN AN ORGANIZED HEALTH CARE EDUCATION/TRAINING PROGRAM

## 2017-07-06 PROCEDURE — 25000132 ZZH RX MED GY IP 250 OP 250 PS 637: Performed by: OBSTETRICS & GYNECOLOGY

## 2017-07-06 RX ADMIN — ACETAMINOPHEN 650 MG: 325 TABLET, FILM COATED ORAL at 08:27

## 2017-07-06 RX ADMIN — SENNOSIDES AND DOCUSATE SODIUM 1 TABLET: 8.6; 5 TABLET ORAL at 08:28

## 2017-07-06 RX ADMIN — IBUPROFEN 800 MG: 400 TABLET ORAL at 08:27

## 2017-07-06 NOTE — PLAN OF CARE
Referral made to [facility] for early discharge, < 96 hours post CS. Note made that mother requires  and infant is in NICU.

## 2017-07-06 NOTE — PLAN OF CARE
Problem: Goal Outcome Summary  Goal: Goal Outcome Summary  Outcome: Therapy, progress toward functional goals as expected  VSS, postpartum assessment WNL. Pt remained afebrile overnight. Denies feeling lightheadedness or dizzy. Ambulating in room independently and voiding without difficulty. Incision open to air with steri strips in place. Pt's goal overnight was to rest - only wanted to pump x 1 overnight. Small blister on L nipple, lanolin and hydrogel given.  at bedside and very supportive. Anticipate DC later today.

## 2017-07-06 NOTE — PLAN OF CARE
Problem: Goal Outcome Summary  Goal: Goal Outcome Summary  Outcome: Adequate for Discharge Date Met:  07/06/17  Data: Vital signs within normal limits. Postpartum checks within normal limits - see flow record. Patient eating and drinking normally. Patient able to empty bladder independently and is up ambulating. No apparent signs of infection. Incision healing well. Patient performing self cares and is able to ambulate to NICU to care for infant.  Action: Patient medicated during the shift for pain. See MAR. Patient reassessed within 1 hour after each medication and pain was improved - patient stated she was comfortable. Patient education done about  High iron foods and discharge instructions and medications with . See flow record.  Response:  Support persons Lisseth present.   Plan: Anticipate discharge on today.

## 2017-07-06 NOTE — DISCHARGE INSTRUCTIONS
Postop  Birth Instructions    Activity       Do not lift more than 10 pounds for 6 weeks after surgery.  Ask family and friends for help when you need it.    No driving until you have stopped taking your pain medications (usually two weeks after surgery).    No heavy exercise or activity for 6 weeks.  Don't do anything that will put a strain on your surgery site.    Don't strain when using the toilet.  Your care team may prescribe a stool softener if you have problems with your bowel movements.     To care for your incision:       Keep the incision clean and dry.    Do not soak your incision in water. No swimming or hot tubs until it has fully healed. You may soak in the bathtub if the water level is below your incision.    Do not use peroxide, gel, cream, lotion, or ointment on your incision.    Adjust your clothes to avoid pressure on your surgery site (check the elastic in your underwear for example).     You may see a small amount of clear or pink drainage and this is normal.  Check with your health care provider:       If the drainage increases or has an odor.    If the incision reddens, you have swelling, or develop a rash.    If you have increased pain and the medicine we prescribed doesn't help.    If you have a fever above 100.4 F (38 C) with or without chills when placing thermometer under your tongue.   The area around your incision (surgery wound), will feel numb.  This is normal. The numbness should go away in less than a year.     Keep your hands clean:  Always wash your hands before touching your incision (surgery wound). This helps reduce your risk of infection. If your hands aren't dirty, you may use an alcohol hand-rub to clean your hands. Keep your nails clean and short.    Call your healthcare provider if you have any of these symptoms:       You soak a sanitary pad with blood within 1 hour, or you see blood clots larger than a golf ball.    Bleeding that lasts more than 6  weeks.    Vaginal discharge that smells bad.    Severe pain, cramping or tenderness in your lower belly area.    A need to urinate more frequently (use the toilet more often), more urgently (use the toilet very quickly), or it burns when you urinate.    Nausea and vomiting.    Redness, swelling or pain around a vein in your leg.    Problems breastfeeding or a red or painful area on your breast.    Chest pain and cough or are gasping for air.    Problems with coping with sadness, anxiety or depression. If you have concerns about hurting yourself or the baby, call your provider immediately.      You have questions or concerns after you return home.               Birth Discharge Instructions: Khmer  Actividad    No levante más de 10 libras aleksander las 6 semanas posteriores a schafer cirugía. Pida a los miembros de schafer cecilia y amigos que la ayuden cuando lo necesite.    No conduzca si está tomando píldoras para el dolor recetadas por schafer médico. Puede conducir si está tomando píldoras de venta jana para el dolor.    No jordan ejercicio ni actividades intensas por 6 semanas. No jordan nada que requiera un esfuerzo en el sitio de schafer cirugía.    No jordan fuerza al usar el baño. Schafer equipo de atención podría recetarle un laxante si tiene problemas para  el intestino (estreñimiento).    Para cuidar de schafer incisión:    Mantenga la incisión limpia y seca    No empape schafer incisión en agua. No nade ni use la cele ni jacuzzis hasta que schafer incisión haya cicatrizado por completo. Puede sentarse en la cele si el nivel del agua está por debajo de schafer incisión.    Después de lavarse, seque tyler schafer incisión. Incluya la piel que podría entrar en contacto con schafer incisión.    No use agua oxigenada, gel, cremas, lociones ni ungüentos sobre schafer incisión.    Ajuste schafer ropa para evitar la presión en el sitio de schafer cirugía (compruebe el elástico en schafer ropa interior, por ejemplo)    Si tiene Steri-Strips, deje las pequeñas tiras de cinta en schafer  sitio. Se caerán solas o puede quitárselas después de ange semana.    Podría stephen ange pequeña cantidad de secreción transparente o rosada y esto es normal. Consulte con schafer proveedor de atención médica:    Si el drenaje aumenta o tiene olor.    Si tiene hinchazón, enrojecimiento o ange erupción alrededor de la incisión.    Si tiene más dolor y schafer medicamento para el dolor no ayuda    Si tiene fiebre de 100.4  F (38  C) o más (temperatura tomada bajo schafer lengua) con o sin escalofríos     El área alrededor de schafer incisión (herida de la cirugía) se sentirá adormecida. [place] es normal. El adormecimiento debería desaparecer en menos de un año.       Mantenga astrid taj limpias:  Siempre lávese las taj antes de tocar schafer incisión. [place] ayuda a reducir schafer riesgo de infección. Si astrid taj no están sucias, puede usar un gel de alcohol para limpiarse las taj. Mantenga astrid uñas cortas y limpias.   Llame a schafer proveedor de atención médica si tiene alguno de estos síntomas:    Empapa ange toalla femenina con campos en el correr de 1 hora o ve coágulos más grandes que ange pelota de golf.    Sangrado que dura más de 6 semanas.    Tiene ange secreción vaginal que huele mal.    Dolor, calambres o sensibilidad graves en la región inferior de schafer vientre.    Necesidad más frecuente o urgente de orinar (hacer pis), o ardor al hacerlo.    Náuseas y vómitos    Enrojecimiento, hinchazón o dolor alrededor de ange vena en schafer pierna.    Problemas para amamantar o un área enrojecida o dolorosa en schafer pecho.    Si tiene dolor en el pecho y tos o dificultad para respirar.    Problemas para manejar la tristeza, ansiedad o depresión.     Si le preocupa hacerse daño o hacerle daño al bebé, llame al médico de inmediato.    Tiene preguntas o inquietudes después de regresar a casa.       Birth Discharge Instructions  Activity    Do not lift more than 10 pounds for 6 weeks after surgery. Ask family and friends for help when you need it.    Do not drive  while taking pain pills prescribed by your doctor. You may drive if taking over-the-counter pain pills.    No heavy exercise or activity for 6 weeks. Don t do anything that will put a strain on your surgery site.    Don t strain when using the toilet. Your care team may prescribe a stool softener if you have problems with your bowel movements (constipation).    To care for your incision:    Keep the incision clean and dry    Do not soak your incision in water. No swimming or hot tubs until your incision has fully healed. You may soak in the bathtub if the water level is below your incision.    After washing, dry your incision well. Include the skin that may come in contact with your incision.    Do not use any peroxide, gel, cream, lotion, or ointment on your incision.     Adjust your clothes to avoid pressure on your surgery site (check the elastic in your underwear for example)    If you have Steri-Strips, leave the small strips of tape in place. They will fall off on their own, or you can remove them after one week.    You may see a small amount of clear or pink drainage and this is normal. Check with your health care provider:    If the drainage increases or has an odor.    If you have swelling, redness, or a rash around the incision.    If you have increased pain and your pain medicine doesn t help    If you have a fever of 100.4  F (38  C) or higher (temperature taken under your tongue), with or without chills   The area around your incision (surgery wound) will feel numb. This is normal. The numbness should go away in less than a year.   Keep your hands clean:   Always wash your hands before touching your incision. This helps reduce your risk of infection. If your hands aren t dirty, you may use an alcohol hand-rub to clean your hands. Keep your nails clean and short.     Call your health care provider if you have any of these symptoms:    You soak a sanitary pad with blood within 1 hour, or you see blood  clots larger than a golf ball.    Bleeding that lasts more than 6 weeks.    You have vaginal discharge that smells bad.    Severe pain, cramping or tenderness in your lower belly area.    A more frequent or urgent need to urinate (pee), or it burns when you pee.    Nausea and vomiting.    Redness, swelling or pain around a vein in your leg.    Problems breastfeeding, or a red or painful area on your breast.    If you have chest pain and cough or are gasping for air.    Problems coping with sadness, anxiety, or depression.     If you have any concerns about hurting yourself of the baby, call your doctor right away.      You have questions or concerns after you return home.

## 2017-07-06 NOTE — LACTATION NOTE
This note was copied from a baby's chart.  D: I met with mom, dad and  for discharge teaching.   I: I gave her a feeding log to use at home and went over the need for 8-12 feedings per day and how many wet diapers and stools she should see each day to show adequate intake. We discussed home storage of breast milk, weaning from pumping, and transitioning to full breastfeeding at home.  I gave the mother handouts on all of these topics. We discussed growth spurts, birth control and other medications, paced bottlefeeding, Babyweigh rental scales, and resources for help at home/ when to seek outpatient help.  She verbalized understanding via teach back.  I observed a feeding.  We discussed supportive hold, positioning, latch, breastfeeding patterns and infant driven feeding, breast support and compressions, skin to skin benefits, and timing of pumpings around breastfeedings.  Dina ate off both sides and mom could easily well the difference between a good and bad latch, and nutritive vs non-nutritive sucks.  I showed her how to do breast compressions to assist with eating.  Rosalie noted to have a mild tongue tie (but takes great volumes, is able to latch easily and mom does not have pain); we discussed potential impact and who to speak with if it starts to be a problem (her primary MD); resident is aware.  I dispensed a Pump in Style  and instructed her in its use.    A: Mom has information and equipment she needs to feed her baby at home.   P: I encouraged her to call with any breastfeeding questions she may have in the future.

## 2017-07-06 NOTE — PLAN OF CARE
Problem: Goal Outcome Summary  Goal: Goal Outcome Summary  Outcome: Improving  VSS and postpartum assessments WDL.  Pt has remained afebrile this shift.  Up ad darcy with steady gait.  Independent with cares.  Pumping independently and also going down to NICU to visit and feed infant.  Speaking positively about infant's progress.  Pain managed with tylenol per MAR.  PIV in left hand running LR between IV antibiotics.  Last dose of IV antibiotics to be administered at 2200.  , Noah present and supportive.  Will continue with postpartum cares and education per plan of care.

## 2017-07-11 LAB
BACTERIA SPEC CULT: NO GROWTH
BACTERIA SPEC CULT: NO GROWTH
Lab: NORMAL
Lab: NORMAL
MICRO REPORT STATUS: NORMAL
MICRO REPORT STATUS: NORMAL
SPECIMEN SOURCE: NORMAL
SPECIMEN SOURCE: NORMAL

## 2017-07-24 ENCOUNTER — PRENATAL OFFICE VISIT (OUTPATIENT)
Dept: OBGYN | Facility: CLINIC | Age: 32
End: 2017-07-24
Payer: COMMERCIAL

## 2017-07-24 VITALS
BODY MASS INDEX: 22.13 KG/M2 | WEIGHT: 121 LBS | HEART RATE: 75 BPM | DIASTOLIC BLOOD PRESSURE: 63 MMHG | SYSTOLIC BLOOD PRESSURE: 95 MMHG | TEMPERATURE: 98.8 F

## 2017-07-24 DIAGNOSIS — Z09 POSTOPERATIVE EXAMINATION: Primary | ICD-10-CM

## 2017-07-24 PROBLEM — Z98.890 POST-OPERATIVE STATE: Status: RESOLVED | Noted: 2017-07-03 | Resolved: 2017-07-24

## 2017-07-24 PROBLEM — Z36.89 ENCOUNTER FOR TRIAGE IN PREGNANT PATIENT: Status: RESOLVED | Noted: 2017-06-23 | Resolved: 2017-07-24

## 2017-07-24 PROBLEM — Z98.890 POSTOPERATIVE STATE: Status: RESOLVED | Noted: 2017-07-03 | Resolved: 2017-07-24

## 2017-07-24 LAB
ERYTHROCYTE [DISTWIDTH] IN BLOOD BY AUTOMATED COUNT: 13.4 % (ref 10–15)
HCT VFR BLD AUTO: 33.3 % (ref 35–47)
HGB BLD-MCNC: 11.1 G/DL (ref 11.7–15.7)
MCH RBC QN AUTO: 30.5 PG (ref 26.5–33)
MCHC RBC AUTO-ENTMCNC: 33.3 G/DL (ref 31.5–36.5)
MCV RBC AUTO: 92 FL (ref 78–100)
PLATELET # BLD AUTO: 469 10E9/L (ref 150–450)
RBC # BLD AUTO: 3.64 10E12/L (ref 3.8–5.2)
WBC # BLD AUTO: 6.6 10E9/L (ref 4–11)

## 2017-07-24 PROCEDURE — 85027 COMPLETE CBC AUTOMATED: CPT | Performed by: OBSTETRICS & GYNECOLOGY

## 2017-07-24 PROCEDURE — 36415 COLL VENOUS BLD VENIPUNCTURE: CPT | Performed by: OBSTETRICS & GYNECOLOGY

## 2017-07-24 PROCEDURE — 99212 OFFICE O/P EST SF 10 MIN: CPT | Performed by: OBSTETRICS & GYNECOLOGY

## 2017-07-24 NOTE — NURSING NOTE
"Chief Complaint   Patient presents with     Post Partum Exam     DOD 7/3/17       Initial BP 95/63 (BP Location: Right arm, Patient Position: Chair, Cuff Size: Adult Regular)  Pulse 75  Temp 98.8  F (37.1  C) (Oral)  Wt 54.9 kg (121 lb)  LMP 10/01/2016  Breastfeeding? Yes  BMI 22.13 kg/m2 Estimated body mass index is 22.13 kg/(m^2) as calculated from the following:    Height as of 7/3/17: 1.575 m (5' 2\").    Weight as of this encounter: 54.9 kg (121 lb).  Medication Reconciliation: complete   Boyd Smith CMA      "

## 2017-07-24 NOTE — MR AVS SNAPSHOT
After Visit Summary   7/24/2017    Jo Ann Patel    MRN: [id number]           Patient Information     Date Of Birth          1985        Visit Information        Provider Department      7/24/2017 12:45 PM Arnaldo Buchanan MD; [employer] [facility]        Care Instructions                                                        If you have any questions regarding your visit, Please contact your care team.     [facility]: [phone]    [facility] CLINIC HOURS TELEPHONE NUMBER   Arnaldo Buchanan M.D.      [place]-    Tyrese Lopez-FRANCOISE Nix-Medical Assistant   Monday-[place]  8:00a.m-4:45 p.m  Wednesday-[place] 8:00a.m-4:45 p.m.  [facility]4:45 p.m.  Friday-[place]  8:00a.m-4:45 p.m. [facility]  [address]  [place], [place] [address] [phone] ask [facility]  [phone] Fax  Imaging [phone]    [facility] Labor and Delivery  [address] [place] [address] [address].  [place], [place] [address] [phone]    [facility]  [address]  [place], [place] [address] [phone] ask [facility]  [phone] Fax  Imaging [phone]     Urgent Care locations:    [facility] Monday-Friday  5 pm - 9 pm  Saturday and Sunday   9 am - 5 pm    Monday-Friday   11 am - 9 pm  Saturday and Sunday   9 am - 5 pm   [phone]       If you need a medication refill, please contact your pharmacy. Please allow 3 business days for your refill to be completed.  As always, Thank you for trusting us with your healthcare needs!            Follow-ups after your visit        Who to contact     If you have questions or need follow up information about today's clinic visit or your schedule please contact [facility] directly at [phone].  Normal or non-critical lab and imaging results  "will be communicated to you by MyChart, letter or phone within 4 business days after the clinic has received the results. If you do not hear from us within 7 days, please contact the clinic through [employer] or phone. If you have a critical or abnormal lab result, we will notify you by phone as soon as possible.  Submit refill requests through [employer] or call your pharmacy and they will forward the refill request to us. Please allow 3 business days for your refill to be completed.          Additional Information About Your Visit        [employer] Information     [employer] lets you send messages to your doctor, view your test results, renew your prescriptions, schedule appointments and more. To sign up, go to [email or web address] . Click on \"Log in\" on the left side of the screen, which will take you to the Welcome page. Then click on \"Sign up Now\" on the right side of the page.     You will be asked to enter the access code listed below, as well as some personal information. Please follow the directions to create your username and password.     Your access code is: [email or web address]  Expires: 2017  9:52 AM     Your access code will  in 90 days. If you need help or a new code, please call your [facility] clinic or [phone].        Care EveryWhere ID     This is your Care EveryWhere ID. This could be used by other organizations to access your [facility] medical records  [id number]        Your Vitals Were     Pulse Temperature Last Period Breastfeeding? BMI (Body Mass Index)       75 98.8  F (37.1  C) (Oral) 10/01/2016 Yes 22.13 kg/m2        Blood Pressure from Last 3 Encounters:   17 95/63   17 (!) 136/92   17 132/85    Weight from Last 3 Encounters:   17 54.9 kg (121 lb)   17 68.9 kg (152 lb)   17 69.3 kg (152 lb 12.8 oz)              Today, you had the following     No orders found for display       Primary Care Provider    [facility]       No address " on file        Equal Access to Services     [facility] HEIDI : Hadii aad ku hadgradyrene Tataali, wawilliamda luqvikasha, qaarianeta ernalilliealicia szymanski, asim brock. So [facility] [phone].    ATENCIÓN: Si habla español, tiene a schafer disposición servicios gratuitos de asistencia lingüística. Llame al [phone].    We comply with applicable federal civil rights laws and [place] laws. We do not discriminate on the basis of race, color, national origin, age, disability sex, sexual orientation or gender identity.            Thank you!     Thank you for choosing [facility]  for your care. Our goal is always to provide you with excellent care. Hearing back from our patients is one way we can continue to improve our services. Please take a few minutes to complete the written survey that you may receive in the mail after your visit with us. Thank you!             Your Updated Medication List - Protect others around you: Learn how to safely use, store and throw away your medicines at www.disposemymeds.org.          This list is accurate as of: 7/24/17  1:12 PM.  Always use your most recent med list.                   Brand Name Dispense Instructions for use Diagnosis    acetaminophen 325 MG tablet    TYLENOL    100 tablet    Take 1-2 tablets (325-650 mg) by mouth every 4 hours as needed for mild pain or fever    Post-operative state       amoxicillin-clavulanate 875-125 MG per tablet    AUGMENTIN     Take 875 mg by mouth        ferrous sulfate 325 (65 FE) MG tablet    IRON    60 tablet    Take 1 tablet (325 mg) by mouth 2 times daily    Anemia due to blood loss, acute       ibuprofen 600 MG tablet    ADVIL/MOTRIN    60 tablet    Take 1 tablet (600 mg) by mouth every 6 hours as needed for moderate pain    Post-operative state       oxyCODONE 5 MG IR tablet    ROXICODONE    30 tablet    Take 1 tablet (5 mg) by mouth every 4 hours as needed for pain    Post-operative state       pyridOXINE 25 MG  tablet    vitamin B-6     Take 25 mg by mouth        senna-docusate 8.6-50 MG per tablet    SENOKOT-S;PERICOLACE    100 tablet    Take 1-2 tablets by mouth 2 times daily    Post-operative state       NERIS DHA 27-0.6-0.4-300 MG Caps

## 2017-07-24 NOTE — PROGRESS NOTES
Jo Ann Patel is a 32 year old year old who is here today for a postoperative exam.      Doing fair after recent surgery-  section.  No signif signs, symptoms or concerns except had postop fever twice and was treated through Urgent Care on  with Amox or Augmentin. Feels better.   Postop BP elevated also but normal now.   Taking po iron. Hgb was very low but did not have a transfusion. BM yest. Breast feeding. Incision healing.   Her infant daughter is doing well and did not need a shunt yet and is progressing well despite hydrocephalus.     Here with .     Past medical, obstetrical, surgical, family and social history reviewed and as noted or updated in chart.      Exam: Abdomen negative.  Incision: intact, no erythema, induration or discharge.    A/P: Satisfactory postop exam. No evidence of infection now. CBC sent. RTC in 3-4 weeks for PPE and Pap/HRHPV. Reviewed operation and findings. See orders.     Arnaldo Buchanan MD

## 2017-07-24 NOTE — PATIENT INSTRUCTIONS
If you have any questions regarding your visit, Please contact your care team.     [facility]: [phone]    [facility] CLINIC HOURS TELEPHONE NUMBER   KAT Snowden-    Tyrese Lopez-FRANCOISE Nix-Medical Assistant   Monday-[place]  8:00a.m-4:45 p.m  Wednesday-[place] 8:00a.m-4:45 p.m.  Thursday-[place]  8:00a.m-4:45 p.m.  Friday-[place]  8:00a.m-4:45 p.m. [facility]  [address]  [place], [place] [address] [phone] ask [facility]  [phone] Fax  Imaging [phone]    [facility] Labor and Delivery  [address] [place] [address] Dr.  [place], [place] [address] [phone]    [facility]  [address] [place] [address]  [phone] ask [facility]  [phone] Fax  Imaging [phone]     Urgent Care locations:    [place]        [place] Monday-Friday  5 pm - 9 pm  Saturday and Sunday   9 am - 5 pm    Monday-Friday   11 am - 9 pm  Saturday and Sunday   9 am - 5 pm   [phone]       If you need a medication refill, please contact your pharmacy. Please allow 3 business days for your refill to be completed.  As always, Thank you for trusting us with your healthcare needs!

## 2017-08-09 LAB — COPATH REPORT: NORMAL

## 2017-08-14 ENCOUNTER — PRENATAL OFFICE VISIT (OUTPATIENT)
Dept: OBGYN | Facility: CLINIC | Age: 32
End: 2017-08-14
Payer: COMMERCIAL

## 2017-08-14 VITALS
HEART RATE: 65 BPM | WEIGHT: 120 LBS | TEMPERATURE: 97.8 F | BODY MASS INDEX: 21.95 KG/M2 | SYSTOLIC BLOOD PRESSURE: 104 MMHG | DIASTOLIC BLOOD PRESSURE: 62 MMHG

## 2017-08-14 PROCEDURE — 87624 HPV HI-RISK TYP POOLED RSLT: CPT | Performed by: OBSTETRICS & GYNECOLOGY

## 2017-08-14 PROCEDURE — G0476 HPV COMBO ASSAY CA SCREEN: HCPCS | Performed by: OBSTETRICS & GYNECOLOGY

## 2017-08-14 PROCEDURE — G0123 SCREEN CERV/VAG THIN LAYER: HCPCS | Performed by: OBSTETRICS & GYNECOLOGY

## 2017-08-14 PROCEDURE — G0145 SCR C/V CYTO,THINLAYER,RESCR: HCPCS | Performed by: OBSTETRICS & GYNECOLOGY

## 2017-08-14 ASSESSMENT — ANXIETY QUESTIONNAIRES
5. BEING SO RESTLESS THAT IT IS HARD TO SIT STILL: NOT AT ALL
1. FEELING NERVOUS, ANXIOUS, OR ON EDGE: NOT AT ALL
GAD7 TOTAL SCORE: 0
IF YOU CHECKED OFF ANY PROBLEMS ON THIS QUESTIONNAIRE, HOW DIFFICULT HAVE THESE PROBLEMS MADE IT FOR YOU TO DO YOUR WORK, TAKE CARE OF THINGS AT HOME, OR GET ALONG WITH OTHER PEOPLE: NOT DIFFICULT AT ALL
3. WORRYING TOO MUCH ABOUT DIFFERENT THINGS: NOT AT ALL
2. NOT BEING ABLE TO STOP OR CONTROL WORRYING: NOT AT ALL
7. FEELING AFRAID AS IF SOMETHING AWFUL MIGHT HAPPEN: NOT AT ALL
6. BECOMING EASILY ANNOYED OR IRRITABLE: NOT AT ALL

## 2017-08-14 ASSESSMENT — PATIENT HEALTH QUESTIONNAIRE - PHQ9: 5. POOR APPETITE OR OVEREATING: NOT AT ALL

## 2017-08-14 NOTE — LETTER
August 23, 2017    Jo Ann Patel  [address]   [place] [address]    Dear Jo Ann,  We are happy to inform you that your PAP smear result from 8/14/17 is normal.  We are now able to do a follow up test on PAP smears. The DNA test is for HPV (Human Papilloma Virus). Cervical cancer is closely linked with certain types of HPV. Your result showed no evidence of high risk HPV.  Therefore we recommend you return in 3 years for your next pap smear and HPV test.  You will still need to return to the clinic every year for an annual exam and other preventive tests.  Please contact the clinic at [phone] with any questions.  Sincerely,    Arnaldo Buchanan MD/ro

## 2017-08-14 NOTE — MR AVS SNAPSHOT
After Visit Summary   8/14/2017    Jo Ann Patel    MRN: [id number]           Patient Information     Date Of Birth          1985        Visit Information        Provider Department      8/14/2017 1:30 PM Arnaldo Buchanan MD; [employer] [facility]        Care Instructions                                                        If you have any questions regarding your visit, Please contact your care team.     [employer] [facility]: [phone]    [facility] CLINIC HOURS TELEPHONE NUMBER   Arnaldo Buchanan M.D.      [place]-    Tyrese Lopez-FRANCOISE Nix-Medical Assistant   Monday-[place]  8:00a.m-4:45 p.m  Wednesday-[place] 8:00a.m-4:45 p.m.  [facility]  8:00[facility].m-4:45 p.m.  Friday-[place]  8:00a.m-4:45 p.m. [facility]  [address]  [place], [place] [address] [phone] ask [facility]  [phone] Fax  Imaging [phone]    [facility] Labor and Delivery  [address] [place] [address] [address].  [place], [place] [address] [phone]    [facility]  [address]  [place], [place] [address] [phone] ask [facility]  [phone] Fax  Imaging [phone]     Urgent Care locations:    [facility] Monday-Friday  5 pm - 9 pm  Saturday and Sunday   9 am - 5 pm    Monday-Friday   11 am - 9 pm  Saturday and Sunday   9 am - 5 pm   [phone]       If you need a medication refill, please contact your pharmacy. Please allow 3 business days for your refill to be completed.  As always, Thank you for trusting us with your healthcare needs!            Follow-ups after your visit        Who to contact     If you have questions or need follow up information about today's clinic visit or your schedule please contact [facility] directly at [phone].  Normal or non-critical lab and imaging results  "will be communicated to you by MyChart, letter or phone within 4 business days after the clinic has received the results. If you do not hear from us within 7 days, please contact the clinic through [employer] or phone. If you have a critical or abnormal lab result, we will notify you by phone as soon as possible.  Submit refill requests through [employer] or call your pharmacy and they will forward the refill request to us. Please allow 3 business days for your refill to be completed.          Additional Information About Your Visit        [employer] Information     [employer] lets you send messages to your doctor, view your test results, renew your prescriptions, schedule appointments and more. To sign up, go to [email or web address] . Click on \"Log in\" on the left side of the screen, which will take you to the Welcome page. Then click on \"Sign up Now\" on the right side of the page.     You will be asked to enter the access code listed below, as well as some personal information. Please follow the directions to create your username and password.     Your access code is: [email or web address]  Expires: 2017  9:52 AM     Your access code will  in 90 days. If you need help or a new code, please call your [facility] clinic or [phone].        Care EveryWhere ID     This is your Care EveryWhere ID. This could be used by other organizations to access your [facility] medical records  [id number]        Your Vitals Were     Pulse Temperature Last Period Breastfeeding? BMI (Body Mass Index)       65 97.8  F (36.6  C) (Oral) 10/01/2016 Yes 21.95 kg/m2        Blood Pressure from Last 3 Encounters:   17 104/62   17 95/63   17 (!) 136/92    Weight from Last 3 Encounters:   17 54.4 kg (120 lb)   17 54.9 kg (121 lb)   17 68.9 kg (152 lb)              Today, you had the following     No orders found for display       Primary Care Provider    [facility]       No address on " file        Equal Access to Services     [facility] Lakes[facility] HospitalJUAQUIN : Hadii delvin ramires jason Gaspar, wawilliamda luqadaha, qaybta kalilliealicia szymanski, asim brock. So [facility] [phone].    ATENCIÓN: Si habla gabrielle, tiene a schafer disposición servicios gratuitos de asistencia lingüística. Taloname al [phone].    We comply with applicable federal civil rights laws and [place] laws. We do not discriminate on the basis of race, color, national origin, age, disability sex, sexual orientation or gender identity.            Thank you!     Thank you for choosing [facility]  for your care. Our goal is always to provide you with excellent care. Hearing back from our patients is one way we can continue to improve our services. Please take a few minutes to complete the written survey that you may receive in the mail after your visit with us. Thank you!             Your Updated Medication List - Protect others around you: Learn how to safely use, store and throw away your medicines at www.disposemymeds.org.          This list is accurate as of: 8/14/17  1:32 PM.  Always use your most recent med list.                   Brand Name Dispense Instructions for use Diagnosis    acetaminophen 325 MG tablet    TYLENOL    100 tablet    Take 1-2 tablets (325-650 mg) by mouth every 4 hours as needed for mild pain or fever    Post-operative state       ferrous sulfate 325 (65 FE) MG tablet    IRON    60 tablet    Take 1 tablet (325 mg) by mouth 2 times daily    Anemia due to blood loss, acute       ibuprofen 600 MG tablet    ADVIL/MOTRIN    60 tablet    Take 1 tablet (600 mg) by mouth every 6 hours as needed for moderate pain    Post-operative state       oxyCODONE 5 MG IR tablet    ROXICODONE    30 tablet    Take 1 tablet (5 mg) by mouth every 4 hours as needed for pain    Post-operative state       senna-docusate 8.6-50 MG per tablet    SENOKOT-S;PERICOLACE    100 tablet    Take 1-2 tablets by mouth 2 times  daily    Post-operative state       ZATEAN-PN DHA 27-0.6-0.4-300 MG Caps

## 2017-08-14 NOTE — PATIENT INSTRUCTIONS
If you have any questions regarding your visit, Please contact your care team.     [employer]: [phone]    [facility] CLINIC HOURS TELEPHONE NUMBER   KAT Snowden-    Tyrese Lopez-FRANCOISE Nix-Medical Assistant   Monday-[place]  8:00a.m-4:45 p.m  Wednesday-[place] 8:00a.m-4:45 p.m.  Thursday-[place]  8:00a.m-4:45 p.m.  Friday-[place]  8:00a.m-4:45 p.m. [facility]  [address]  [place], [place] [address] [phone] ask [facility]  [phone] Fax  Imaging [phone]    [facility] Labor and Delivery  [address] [place] [address] Dr.  [place], [place] [address] [phone]    [facility]  [address] [place] [address]  [phone] ask [facility]  [phone] Fax  Imaging [phone]     Urgent Care locations:    [place]        [place] Monday-Friday  5 pm - 9 pm  Saturday and Sunday   9 am - 5 pm    Monday-Friday   11 am - 9 pm  Saturday and Sunday   9 am - 5 pm   [phone]       If you need a medication refill, please contact your pharmacy. Please allow 3 business days for your refill to be completed.  As always, Thank you for trusting us with your healthcare needs!

## 2017-08-14 NOTE — NURSING NOTE
"Chief Complaint   Patient presents with     Post Partum Exam     DOD 7/3/17       Initial /62 (BP Location: Right arm, Patient Position: Chair, Cuff Size: Adult Regular)  Pulse 65  Temp 97.8  F (36.6  C) (Oral)  Wt 54.4 kg (120 lb)  LMP 10/01/2016  Breastfeeding? Yes  BMI 21.95 kg/m2 Estimated body mass index is 21.95 kg/(m^2) as calculated from the following:    Height as of 7/3/17: 1.575 m (5' 2\").    Weight as of this encounter: 54.4 kg (120 lb).  Medication Reconciliation: complete   Boyd Smith CMA      "

## 2017-08-15 PROBLEM — Z34.80 SUPERVISION OF OTHER NORMAL PREGNANCY, ANTEPARTUM: Status: RESOLVED | Noted: 2017-03-21 | Resolved: 2017-08-15

## 2017-08-15 PROBLEM — O35.00X0 MATERNAL CARE FOR SUSPECTED CENTRAL NERVOUS SYSTEM MALFORMATION IN FETUS: Status: RESOLVED | Noted: 2017-03-21 | Resolved: 2017-08-15

## 2017-08-15 ASSESSMENT — ANXIETY QUESTIONNAIRES: GAD7 TOTAL SCORE: 0

## 2017-08-15 NOTE — PROGRESS NOTES
Jo Ann Patel is a 32 year old year old G 2 P 2 who is here today for a postpartum exam.    HPI:      Doing well and without signif sx or concerns except mild low back pain, chronic itching in ears, incisional soreness, and small bump at IV site in left forearm. Currently breast feeding infant. Here today with mother and . Infant doing ok and having shunt placed on 8/25. Contraceptive method planned is Nexplanon- method, RBA reviewed. NSA since delivery. PP depression screening as noted. See PHQ-9. Score = 0.    Past medical, obstetrical, surgical, family and social history reviewed and as noted or updated in chart.     Allergies, meds and supplements are as noted or updated in chart.      ROS:     Systems reviewed include constitutional; breast;                 cardiac; respiratory; gastrointestinal; genitourinary;                                musculoskeletal; integumentary; psychological;                                hematologic/lymphatic and endocrine.                  These systems were negative for significant symptoms except                 for the following: none and see HPI.                                Exam:  VS as noted.                    Ears, Back, Abd and Pelvis were                             normal or negative except for, or in particular noting, the following                pertinent findings: limited bilateral ear cerumen, small nodule at IV site in left forearm without cellulitis, Wd A, mod stool in rectum.       Assessment: Postpartum exam    Plan and Recommendations: Symptoms, problems and concerns reviewed.  Discussed pregnancy, birth, future pregnancy plans, work plans and infant care issues.  Problem list updated and Pregnancy Episode closed. See orders. RTC for Nexplanon insertion soon.    Jo Ann was seen today for post partum exam.    Diagnoses and all orders for this visit:    Routine postpartum follow-up  -     Pap imaged thin layer screen with HPV -  recommended age 30 - 65  -     HPV High Risk Types DNA Cervical        Arnaldo Buchanan MD

## 2017-08-16 LAB
COPATH REPORT: NORMAL
PAP: NORMAL

## 2017-08-18 LAB
FINAL DIAGNOSIS: NORMAL
HPV HR 12 DNA CVX QL NAA+PROBE: NEGATIVE
HPV16 DNA SPEC QL NAA+PROBE: NEGATIVE
HPV18 DNA SPEC QL NAA+PROBE: NEGATIVE
SPECIMEN DESCRIPTION: NORMAL

## 2017-09-18 ENCOUNTER — OFFICE VISIT (OUTPATIENT)
Dept: OBGYN | Facility: CLINIC | Age: 32
End: 2017-09-18
Payer: COMMERCIAL

## 2017-09-18 VITALS
OXYGEN SATURATION: 100 % | HEART RATE: 53 BPM | BODY MASS INDEX: 21.29 KG/M2 | DIASTOLIC BLOOD PRESSURE: 75 MMHG | SYSTOLIC BLOOD PRESSURE: 110 MMHG | WEIGHT: 116.4 LBS

## 2017-09-18 DIAGNOSIS — Z30.017 NEXPLANON INSERTION: Primary | ICD-10-CM

## 2017-09-18 DIAGNOSIS — Z32.00 PREGNANCY EXAMINATION OR TEST, PREGNANCY UNCONFIRMED: ICD-10-CM

## 2017-09-18 LAB — BETA HCG QUAL IFA URINE: NEGATIVE

## 2017-09-18 PROCEDURE — 99213 OFFICE O/P EST LOW 20 MIN: CPT | Mod: 25 | Performed by: OBSTETRICS & GYNECOLOGY

## 2017-09-18 PROCEDURE — 11981 INSERTION DRUG DLVR IMPLANT: CPT | Performed by: OBSTETRICS & GYNECOLOGY

## 2017-09-18 PROCEDURE — 84703 CHORIONIC GONADOTROPIN ASSAY: CPT | Performed by: OBSTETRICS & GYNECOLOGY

## 2017-09-18 NOTE — PATIENT INSTRUCTIONS
If you have any questions regarding your visit, Please contact your care team.    Women s Health CLINIC HOURS TELEPHONE NUMBER   Michelle DO Jesus.    SHAN Moser -    FRANCOISE Serrato RN       Monday, Wednesday, Thursday and Friday, [place]  8:30a.m-5:00 p.m   [facility]  [address] [place] [address]  [phone] ask for [facility]    Imaging [phone]       Urgent Care locations:    [facility] Saturday and Sunday   9 am - 5 pm    Monday-Friday   12 pm - 8 pm  Saturday and Sunday   9 am - 5 pm   [phone]     [facility] Labor and Delivery:  [phone]    If you need a medication refill, please contact your pharmacy. Please allow 3 business days for your refill to be completed.  As always, Thank you for trusting us with your healthcare needs!

## 2017-09-18 NOTE — MR AVS SNAPSHOT
After Visit Summary   9/18/2017    Jo Ann Patel    MRN: [id number]           Patient Information     Date Of Birth          1985        Visit Information        Provider Department      9/18/2017 9:15 AM Michelle Lee DO; [employer]; PROC RM 1 [facility]        Today's Diagnoses     Pregnancy examination or test, pregnancy unconfirmed    -  1      Care Instructions                                                         If you have any questions regarding your visit, Please contact your care team.    Women s Health CLINIC HOURS TELEPHONE NUMBER   Michelle Lee DO.    SHAN Moser -    FRANCOISE Serrato RN       [facility], Wednesday, [facility]  8:30a.m-5:00 p.m   [facility]  [address] [place] [address]  [phone] ask for [facility]    Imaging [phone]       Urgent Care locations:    [facility] Saturday and Sunday   9 am - 5 pm    Monday-Friday   12 pm - 8 pm  Saturday and Sunday   9 am - 5 pm   [phone]     [facility] Labor and Delivery:  [phone]    If you need a medication refill, please contact your pharmacy. Please allow 3 business days for your refill to be completed.  As always, Thank you for trusting us with your healthcare needs!                Follow-ups after your visit        Who to contact     If you have questions or need follow up information about today's clinic visit or your schedule please contact [facility] directly at [phone].  Normal or non-critical lab and imaging results will be communicated to you by MyChart, letter or phone within 4 business days after the clinic has received the results. If you do not hear from us within 7 days, please contact the clinic through MyChart or phone. If you have a critical or abnormal lab result, we will  "notify you by phone as soon as possible.  Submit refill requests through [employer] or call your pharmacy and they will forward the refill request to us. Please allow 3 business days for your refill to be completed.          Additional Information About Your Visit        [employer] Information     [employer] lets you send messages to your doctor, view your test results, renew your prescriptions, schedule appointments and more. To sign up, go to [email or web address] . Click on \"Log in\" on the left side of the screen, which will take you to the Welcome page. Then click on \"Sign up Now\" on the right side of the page.     You will be asked to enter the access code listed below, as well as some personal information. Please follow the directions to create your username and password.     Your access code is: [email or web address]  Expires: 2017 10:03 AM     Your access code will  in 90 days. If you need help or a new code, please call your [facility] clinic or [phone].        Care EveryWhere ID     This is your Care EveryWhere ID. This could be used by other organizations to access your [facility] medical records  [id number]        Your Vitals Were     Pulse Pulse Oximetry Breastfeeding? BMI (Body Mass Index)          53 100% Yes 21.29 kg/m2         Blood Pressure from Last 3 Encounters:   17 110/75   17 104/62   17 95/63    Weight from Last 3 Encounters:   17 52.8 kg (116 lb 6.4 oz)   17 54.4 kg (120 lb)   17 54.9 kg (121 lb)              We Performed the Following     Beta HCG qual IFA urine        Primary Care Provider    [facility]       No address on file        Equal Access to Services     IRAM GORDON : Hadii delvin Gaspar, wawilliamda lucarlos manueladaha, qaybta kaalmada nessa, asim brock. So [facility] [phone].    ATENCIÓN: Si habla español, tiene a schafer disposición servicios gratuitos de asistencia lingüística. Llame al " [phone].    We comply with applicable federal civil rights laws and [place] laws. We do not discriminate on the basis of race, color, national origin, age, disability sex, sexual orientation or gender identity.            Thank you!     Thank you for choosing [facility]  for your care. Our goal is always to provide you with excellent care. Hearing back from our patients is one way we can continue to improve our services. Please take a few minutes to complete the written survey that you may receive in the mail after your visit with us. Thank you!             Your Updated Medication List - Protect others around you: Learn how to safely use, store and throw away your medicines at www.disposemymeds.org.          This list is accurate as of: 9/18/17 10:03 AM.  Always use your most recent med list.                   Brand Name Dispense Instructions for use Diagnosis    ALEXANDRA-PN DHA 27-0.6-0.4-300 MG Caps

## 2017-09-18 NOTE — PROGRESS NOTES
This 31 y/o female, , presents for insertion of Nexplanon for contraception and denies any risk of pregnancy.  She had a  in  followed by a primary C/S on 7/3/17 and is currently breastfeeding.  Her pre-procedural UPT result was negative this morning.  Other contraceptive options were discussed but she is only interested in Nexplanon.  Informed consent was reviewed and obtained.  She is not sure if she will want another child in 3+ years but not before then.  She declines a flu vaccine today.  /75  Pulse 53  Wt 52.8 kg (116 lb 6.4 oz)  SpO2 100%  Breastfeeding? Yes  BMI 21.29 kg/m2  The patient states that she is left-handed so will place the implant in her right arm.  She was very nervous so I had Shanti, the MA, sit with her during the procedure.  The Latvian interpretor did not show for her appt but the pt felt comfortable enough with her English to skip having an interpretor.  She flexed her right arm at the elbow with her right hand near her right ear.  I measured 8 cm medial to the right epicondyle and made a purple dot.  A second jim was made 2 cm medial to this dot for guidance.  The skin was cleansed at the entry site using Betadine x 3 swabs followed by an alcohol wipe.  The skin was injected at the entry site but not involving the purple dots, and along the track.  The site was tested and found to be numb.  The Nexplanon was then inserted per protocol and the patient tolerated this well.  The entry site was covered by 2 steristrips after Tincture of Benzoin was applied to the skin.  EBL was 1 cc and there were no complications.  A Band-aid was applied to the site followed by an ACE wrap.  F/u directions were reviewed both verbally and per handout.  All her questions were addressed.  Assessment - Nexplanon insertion for contraception  Plan - F/u as discussed.  This was a 30-minute visit and 10 minutes of this time were spent in the procedure.    Nexplanon NDC #1787-7943-47  Lot  #[id number]  Exp 08/2019

## 2017-09-18 NOTE — NURSING NOTE
"Chief Complaint   Patient presents with     Contraception     Nexplanon insertion       Initial /75  Pulse 53  Wt 52.8 kg (116 lb 6.4 oz)  SpO2 100%  Breastfeeding? Yes  BMI 21.29 kg/m2 Estimated body mass index is 21.29 kg/(m^2) as calculated from the following:    Height as of 7/3/17: 1.575 m (5' 2\").    Weight as of this encounter: 52.8 kg (116 lb 6.4 oz).  Medication Reconciliation:   Shanti Veloz CMA  September 18, 2017 10:03 AM        "

## 2017-11-30 ENCOUNTER — OFFICE VISIT (OUTPATIENT)
Dept: PEDIATRICS | Facility: CLINIC | Age: 32
End: 2017-11-30
Payer: COMMERCIAL

## 2017-11-30 VITALS
WEIGHT: 115.7 LBS | SYSTOLIC BLOOD PRESSURE: 98 MMHG | OXYGEN SATURATION: 100 % | DIASTOLIC BLOOD PRESSURE: 50 MMHG | HEART RATE: 68 BPM | BODY MASS INDEX: 21.16 KG/M2 | TEMPERATURE: 96.6 F

## 2017-11-30 DIAGNOSIS — L30.9 ECZEMA, UNSPECIFIED TYPE: ICD-10-CM

## 2017-11-30 DIAGNOSIS — N63.25 BREAST LUMP ON LEFT SIDE AT 6 O'CLOCK POSITION: Primary | ICD-10-CM

## 2017-11-30 DIAGNOSIS — M77.8 TENDINITIS OF LEFT WRIST: ICD-10-CM

## 2017-11-30 PROCEDURE — 99203 OFFICE O/P NEW LOW 30 MIN: CPT | Performed by: NURSE PRACTITIONER

## 2017-11-30 NOTE — NURSING NOTE
"Chief Complaint   Patient presents with     Mass     lump on the left breast x 2 months       Initial BP 98/50 (BP Location: Right arm, Patient Position: Sitting, Cuff Size: Adult Regular)  Pulse 68  Temp 96.6  F (35.9  C) (Temporal)  Wt 115 lb 11.2 oz (52.5 kg)  SpO2 100%  Breastfeeding? Yes  BMI 21.16 kg/m2 Estimated body mass index is 21.16 kg/(m^2) as calculated from the following:    Height as of 7/3/17: 5' 2\" (1.575 m).    Weight as of this encounter: 115 lb 11.2 oz (52.5 kg).  Medication Reconciliation: complete      MANNIE Diallo      "

## 2017-11-30 NOTE — PROGRESS NOTES
SUBJECTIVE:   Jo Ann Patel is a 32 year old female who presents to clinic today for the following health issues:  This patient is accompanied in the office by her .    Concern - lump in the left breast  Onset: 2 months    Description:   Pea size lump in the left breast. Only painful when breast feeding. Denies discharge, swelling or itching.    Intensity: mild    Progression of Symptoms:  improving    Accompanying Signs & Symptoms:  Decreased in size    Previous history of similar problem:   none    Precipitating factors:   Worsened by: breast feeding    Alleviating factors:  Improved by: none    Therapies Tried and outcome: none    Noticed rash on her arm   it about 2 months ago but thinks it may be getting better.  Also has one on her left arm.       Also complaint of hand cramping in the left hand and left thumb will hurt  No injury to the hand.   Is left handed. Has noticed will notice when she grabs things will bother her.   Thinks it may have been after her IV in labor       Problem list and histories reviewed & adjusted, as indicated.  Additional history: as documented    Patient Active Problem List   Diagnosis   (none) - all problems resolved or deleted     Past Surgical History:   Procedure Laterality Date     APPENDECTOMY        SECTION N/A 7/3/2017    Procedure:  SECTION;  Primary  Section ;  Surgeon: Luz Maria Kwan MD;  Location: [facility]     HC INSERTION INTRAUTERINE DEVICE       HC REMOVE INTRAUTERINE DEVICE         Social History   Substance Use Topics     Smoking status: Never Smoker     Smokeless tobacco: Never Used     Alcohol use No     History reviewed. No pertinent family history.      Current Outpatient Prescriptions   Medication Sig Dispense Refill     etonogestrel (IMPLANON/NEXPLANON) 68 MG IMPL 1 each (68 mg) by Subdermal route continuous  0     Prenat w/o E-JB-Lpgoggm-FA-DHA (ZATEAN-PN DHA) 27-0.6-0.4-300 MG CAPS        No  Known Allergies      Reviewed and updated as needed this visit by clinical staffTobacco  Allergies  Meds  Med Hx  Surg Hx  Fam Hx  Soc Hx      Reviewed and updated as needed this visit by Provider         ROS:  Constitutional, HEENT, cardiovascular, pulmonary, gi and gu systems are negative, except as otherwise noted.      OBJECTIVE:   BP 98/50 (BP Location: Right arm, Patient Position: Sitting, Cuff Size: Adult Regular)  Pulse 68  Temp 96.6  F (35.9  C) (Temporal)  Wt 115 lb 11.2 oz (52.5 kg)  SpO2 100%  Breastfeeding? Yes  BMI 21.16 kg/m2  Body mass index is 21.16 kg/(m^2).  GENERAL APPEARANCE: alert, active and no distress  RESP: lungs clear to auscultation - no rales, rhonchi or wheezes  BREAST: Inspection negative. No nipple discharge or bleeding. No masses., positive findings: nodule 2 cm, smooth, firm, non-tender and well delineated from surrounding tissue located left 6 o'clock  CV: regular rates and rhythm and no murmur, click or rub  MS: extremities normal- no gross deformities noted  Wrist exam -  normal, full range of motion, no swelling, mild tenderness to palpation over radial aspect  or deformities. Normal radial pulse. Negative Phalen/Tinel signs.  PSYCH: mentation appears normal and affect normal/bright    Diagnostic Test Results:  Recent Results (from the past 744 hour(s))   MA Diagnostic Digital Bilateral    Narrative    Examinations: MA DIAGNOSTIC DIGITAL BILATERAL, US BREAST LEFT LIMITED  1-3 QUADRANTS, 12/4/2017 2:01 PM and CAD    Comparisons: Baseline mammogram    History: 32 years old with left breast lump for couple of months. The  patient is currently breast-feeding a 5-month-old.    Breast Density: Heterogeneously dense    Technique: Standard mammographic views were performed .    Findings:     Mammogram:  There are no suspicious mammographic findings in either breast.    Ultrasound:  Targeted, real-time ultrasound evaluation was performed by the  technologist and radiologist.  The patient had difficulty localizing  the pea-sized lump today. In the general area of concern in the left  inferior breast, there is normal glandular tissue.      Impression    Impression: BI-RADS CATEGORY: 1 -  NEGATIVE    Recommended Follow-up: Annual Mammography Beginning at Age 40.  Clinical follow-up of left breast lump.    The finding and recommendation were discussed with the patient at the  time of evaluation.    The patient was given the results of the examination.    HIREN BOJORQUEZ MD   US Breast Left    Narrative    Examinations: MA DIAGNOSTIC DIGITAL BILATERAL, US BREAST LEFT LIMITED  1-3 QUADRANTS, 12/4/2017 2:01 PM and CAD    Comparisons: Baseline mammogram    History: 32 years old with left breast lump for couple of months. The  patient is currently breast-feeding a 5-month-old.    Breast Density: Heterogeneously dense    Technique: Standard mammographic views were performed .    Findings:     Mammogram:  There are no suspicious mammographic findings in either breast.    Ultrasound:  Targeted, real-time ultrasound evaluation was performed by the  technologist and radiologist. The patient had difficulty localizing  the pea-sized lump today. In the general area of concern in the left  inferior breast, there is normal glandular tissue.      Impression    Impression: BI-RADS CATEGORY: 1 -  NEGATIVE    Recommended Follow-up: Annual Mammography Beginning at Age 40.  Clinical follow-up of left breast lump.    The finding and recommendation were discussed with the patient at the  time of evaluation.    The patient was given the results of the examination.    HIREN BOJORQUEZ MD         ASSESSMENT/PLAN:     Jo Ann was seen today for mass.    Diagnoses and all orders for this visit:    Breast lump on left side at 6 o'clock position  -     MA Diagnostic Digital Bilateral; Future  -     Will follow up and/or notify patient on results via phone or mail to determine further need for followup    Tendinitis of left  wrist  Symptomatic therapy suggested: OTC ibuprofen, aleve and call prn if symptoms persist or worsen.    Eczema, unspecified type  Use of regular emollient IE white crisco, vaseline, aquaphor, eucerin, etc. and sensitive skin soap, etc. to prevent occurence discussed.  Avoid fabric softener and scented products.  Questions about association with asthma/allergy reviewed.  Follow up prn.    PLAN:   1.   Symptomatic therapy suggested: OTC aleve and call prn if symptoms persist or worsen.    2.  Orders Placed This Encounter   Procedures     MA Diagnostic Digital Bilateral     US Breast Left Complete 4 Quadrants       3. Patient needs to follow up in if no improvement,or sooner if worsening of symptoms or other symptoms develop.  FURTHER TESTING:       - mammogram      See Patient Instructions    TREVA Valladares CNP  [facility]

## 2017-11-30 NOTE — PATIENT INSTRUCTIONS
PLAN:   1.   Symptomatic therapy suggested: OTC aleve and call prn if symptoms persist or worsen.    2.  Orders Placed This Encounter   Procedures     MA Diagnostic Digital Bilateral     US Breast Left Complete 4 Quadrants       3. Patient needs to follow up in if no improvement,or sooner if worsening of symptoms or other symptoms develop.  FURTHER TESTING:       - mammogram    It was a pleasure seeing you today at the [facility]. Thank you for allowing us to care for you today. We truly hope we provided you with the excellent service you deserve. Please let us know if there is anything else we can do for you so we can be sure you are leaving completley satisfied with your care experience.       General information about your clinic   Clinic Hours Lab Hours (Appointments are required)   Mon-Thurs: 7:30 AM - 7 PM Mon-Thurs: 7:30 AM - 7 PM   Fri: 7:30 AM - 5 PM Fri: 7:30 AM - 5 PM        After Hours Nurse Advise & Appts:  [facility]: [phone]  [facility] On Call: to make appointments anytime: [phone] On Call Physician: call [phone] and answering service will page the on call physician.        For urgent appointments, please call [phone] and ask for the triage nurse or your care team clinic nurse.  How to contact my care team:  MyChart: [email or web address]   Phone: [phone]   Fax: [phone]       [facility]:   Phone: [phone]  Hours: 8:00 AM - 6:00 PM  Medication Refills:  Call your pharmacy and they will forward the refill to us. Please allow 3 business days for your refills to be completed.       Normal or non-critical lab and imaging results will be communicated to you by MyChart, letter or phone within 7 days.  If you do not hear from us within 10 days, please call the clinic. If you have a critical or abnormal lab result, we will notify you by phone as soon as possible.       We now have PWIC (Pediatric Walk in  Care)  Monday-Friday from 7:30-4. Simply walk in and be seen for your urgent needs like cough, fever, rash, diarrhea or vomiting, pink eye, UTI. No appointments needed. Ask one of the team for more information      -Your Care Team:    Dr. yMron Ibrahim - Internal Medicine/Pediatrics   Dr. Robson Wilson - Family Medicine  Dr. Danette Sow - Pediatrics  Esmer Garcia CNP - Family Practice Nurse Practitioner  Dr. Taylor Mendoza - Pediatrics           Tendinitis [Tendonitis]  Un TENDÓN es ange zepeda espesa de tejido que une el músculo al hueso y hace que se muevan las articulaciones. La tendinitis es la inflamación de un tendón, y puede ser consecuencia del sobreuso del mismo, ange lesión o ange infección. La tendinitis suele presentarse en los hombros, antebrazos, muñecas, taj y pies. Los síntomas son dolor localizado, hinchazón y sensibilidad al tacto. El movimiento de la articulación afectada aumenta el dolor.  La tendinitis tarda unas 4 a 6 semanas en sanar. El tratamiento consiste en la inmovilización del tendón con ange férula o abrazadera y el uso de medicamentos antiinflamatorios.  Cuidados En [place]:  1. Para aliviar el dolor aleksander el primer día, aplique ange bolsa de hielo (cubitos de hielo en ange bolsa de plástico, envuelta en ange toalla) sobre la shelley lesionada aleksander 20 minutos cada 1-2 horas. Continúe esta práctica 3-4 veces al día hasta que el dolor y la hinchazón desaparezcan.  2. Descanse la articulación inflamada y protéjala del movimiento.  3. Puede lexie ibuprofeno (Motrin o Advil) o naproxeno (Aleve o Naprosyn) para aliviar el dolor y la inflamación, a menos que le hayan recetado otro medicamento. Si no puede lexie estos medicamentos, el acetaminofén (Tylenol) puede ayudarle a aliviar el dolor, demarcus no reducirá la inflamación. [ NOTA : Si tiene ange enfermedad hepática o renal crónica, o ha tenido alguna vez ange úlcera estomacal o sangrado gastrointestinal, consulte con schafer médico antes de lexie estos  medicamentos.]  4. A medida que mejoran astrid síntomas, comience a  gradualmente la articulación afectada.  Programe ange VISITA DE CONTROL con schafer médico si no mejora al cabo de miguelangel días de tratamiento.  Busque Prontamente Atención Médica  si algo de lo siguiente ocurre:    Enrojecimiento sobre la shelley dolorida    Dolor o hinchazón crecientes en la articulación    Fiebre de 100.4 F (38 C) o más josé antonio, o andie le haya indicado schafer proveedor de atención médica  Date Last Reviewed: 11/21/2015 2000-2017 [employer]. [address] [place] [id number]. Todos los derechos reservados. Esta información no pretende sustituir la atención médica profesional. Sólo schafer médico puede diagnosticar y tratar un problema de jeannette.        Manejo de la dermatitis atópica     Después de bañarse, dese palmaditas suaves en schafer piel para secarla (no se frote). Aplíquese el humectante mientras schafer piel está húmeda.   Para manejar astrid síntomas y ayuda a reducir schafer intensidad y schafer frecuencia, pruebe estos consejos de cuidado personal:  Cuide schafer piel    Báñese con un limpiador suave sin perfume (o un limpiador que no tenga jabón). Enjuague tyler, y séquese dando suaves golpecitos.    Michie lynne tibios, no calientes, en la cele.    Aplíquese el humectante libremente maral después de bañarse, cuando todavía tenga la piel húmeda.    Evite rascarse, porque puede dañarle aún más la piel.     Puede usar hidrocortisona tópica, de venta jana, para ayudar a aliviar los síntomas leves.   Controle schafer entorno    Evite temperaturas extremas, tanto frío andie calor.    Evite el aire muy húmedo o muy seco.    Si en schafer casa o schafer oficina, el aire es muy seco, use un humidificador.    Evite los alérgenos, medardo andie el polvo, que puede estar presente en la ropa de cama, las alfombras, los juguetes de plush, o los tapetes.    Tenga presente que el pelo y la caspa de los animales pueden causar erupciones.  Busque tratamiento médico  Otra opción  para mantener los síntomas bajo control es buscar un tratamiento médico. Hable con schafer proveedor de atención médica sobre el tipo de tratamiento que puede funcionar mejor para usted. Puede que le indiquen un tratamiento tópico para que se aplique sobre la piel a diario. También puede que le receten medicamentos orales (que se lupe por boca). Entre los medicamentos que quizás le indiquen están los antihistamínicos, los antibióticos o los corticosteroides. Puede que necesite aplicarse algunas inyecciones para aliviar los síntomas, y que incluso necesite antibióticos si tiene infecciones en la piel. No todos los tratamientos funcionan de igual manera para todos los pacientes. Por eso, si los síntomas persisten o empeoran, pregunte a schafer proveedor de atención médica sobre otros tratamientos.  Decisiones sobre el estilo de vincenzo    Maneje el estrés en schafer vincenzo.    Póngase ropa holgada de algodón que no le apriete o roce la piel.    Evite el contacto con la kaykay u otras telas que pican.    Use productos sin perfume.  Para obtener buenos resultados  Ahora que sabe más sobre la dermatitis atópica, el próximo paso depende de usted. Siga el plan de tratamiento que le haya dado schafer proveedor de atención médica y schafer rutina de cuidado personal. Coram le ayudará a mantener la dermatitis atópica bajo control. Si astrid síntomas persisten, asegúrese de informar a schafer proveedor de atención médica.   Date Last Reviewed: 10/14/2013    [id number] [employer]. [address] [place] [address], [place] [address]. Todos los derechos reservados. Esta información no pretende sustituir la atención médica profesional. Sólo schafer médico puede diagnosticar y tratar un problema de jeannette.         Qué son las afecciones de seno benignas?  La mayoría de las enfermedades de seno son benignas (no cancerosas), por lo cual no le causarán daños serios. Andres todas las mujeres corren cierto andreea de riesgo de presentar cáncer de seno. Gabriela riesgo se va  incrementando con la edad. Por esta razón, si observa cualquier cambio que no le parezca normal, consulte a schafer proveedor de atención médica. De philippe modo, se asegurará un tratamiento adecuado en miya de existir algún problema.  Infección de los senos  Las infecciones del tejido de los senos pueden causar enrojecimiento, calor, dolor o sensibilidad de la piel. La infección más común es la mastitis. Esta inflamación de las glándulas mamarias puede presentarse aleksander la lactancia. La mastitis y otras infecciones de los senos generalmente se tratan con antibióticos.  Secreción de los pezones  Muchas mujeres pueden presentar keisha pequeña secreción de líquido emi o lechoso a través de mian o ambos pezones al presionarlos. Esta secreción puede ser normal. Otros tipos de secreción pueden ser síntomas de keisha afección de seno. Keisha secreción oscura puede ser causada por un papiloma intraductal (crecimiento vijaya en un conducto cerca del pezón). Cualquier secreción oscura o sanguinolenta a través del pezón, o que se produzca sin presionarlo, deberá ser examinada por schafer proveedor de atención médica.    Cambios fibroquísticos  Estos cambios benignos pueden causar un engrosamiento de los senos que puede producir sensibilidad y dolor. Además, los senos pueden sentirse más densos en algunas zonas que en otras. Algunas veces también se pueden formar nódulos sólidos o llenos de fluido (quistes). Los quistes pueden ser lisos, blandos o firmes, y sensibles. Pueden aumentar de tamaño y volverse más sensibles maral antes de schafer menstruación.    Nódulos de seno benignos  Los nódulos de seno benignos se presentan en todas las formas, texturas y tamaños. Un fibroadenoma (nódulo de tejido fibroso) puede ser liso, firme y flexible. Por lo general, es indoloro y móvil. Pídale a schafer proveedor de atención médica que le examine cualquier nódulo que tenga.  Date Last Reviewed: 8/13/2015 2000-2017 [employer]. [address] [place] [address]  Road, [place] [address]. Todos los derechos reservados. Esta información no pretende sustituir la atención médica profesional. Sólo schafer médico puede diagnosticar y tratar un problema de jeannette.

## 2017-11-30 NOTE — MR AVS SNAPSHOT
After Visit Summary   11/30/2017    Jo Ann Patel    MRN: [id number]           Patient Information     Date Of Birth          1985        Visit Information        Provider Department      11/30/2017 9:00 AM Esmer Garcia APRN CNP; [employer] [facility]        Today's Diagnoses     Breast lump on left side at 6 o'clock position    -  1    Tendinitis of left wrist        Eczema, unspecified type          Care Instructions    PLAN:   1.   Symptomatic therapy suggested: OTC aleve and call prn if symptoms persist or worsen.    2.  Orders Placed This Encounter   Procedures     MA Diagnostic Digital Bilateral     US Breast Left Complete 4 Quadrants       3. Patient needs to follow up in if no improvement,or sooner if worsening of symptoms or other symptoms develop.  FURTHER TESTING:       - mammogram    It was a pleasure seeing you today at the [facility]. Thank you for allowing us to care for you today. We truly hope we provided you with the excellent service you deserve. Please let us know if there is anything else we can do for you so we can be sure you are leaving completley satisfied with your care experience.       General information about your clinic   Clinic Hours Lab Hours (Appointments are required)   Mon-Thurs: 7:30 AM - 7 PM Mon-Thurs: 7:30 AM - 7 PM   Fri: 7:30 AM - 5 PM Fri: 7:30 AM - 5 PM        After Hours Nurse Advise & Appts:  [facility]: [phone]  [facility] On Call: to make appointments anytime: [phone] On Call Physician: call [phone] and answering service will page the on call physician.        For urgent appointments, please call [phone] and ask for the triage nurse or your care team clinic nurse.  How to contact my care team:  MyChart: [email or web address]   Phone: [phone]   Fax: [phone]       [facility]:   Phone: [phone]  Hours: 8:00 AM - 6:00 PM   Medication Refills:  Call your pharmacy and they will forward the refill to us. Please allow 3 business days for your refills to be completed.       Normal or non-critical lab and imaging results will be communicated to you by MyChart, letter or phone within 7 days.  If you do not hear from us within 10 days, please call the clinic. If you have a critical or abnormal lab result, we will notify you by phone as soon as possible.       We now have PWIC (Pediatric Walk in Care)  Monday-Friday from 7:30-4. Simply walk in and be seen for your urgent needs like cough, fever, rash, diarrhea or vomiting, pink eye, UTI. No appointments needed. Ask one of the team for more information      -Your Care Team:    Dr. Myron Ibrahim - Internal Medicine/Pediatrics   Dr. Robson Wilson - Family Medicine  Dr. Danette Sow - Pediatrics  Esmer Garcia CNP - Family Practice Nurse Practitioner  Dr. Taylor Mendoza - Pediatrics           Tendinitis [Tendonitis]  Un TENDÓN es ange zepeda espesa de tejido que une el músculo al hueso y hace que se muevan las articulaciones. La tendinitis es la inflamación de un tendón, y puede ser consecuencia del sobreuso del mismo, ange lesión o ange infección. La tendinitis suele presentarse en los hombros, antebrazos, muñecas, taj y pies. Los síntomas son dolor localizado, hinchazón y sensibilidad al tacto. El movimiento de la articulación afectada aumenta el dolor.  La tendinitis tarda unas 4 a 6 semanas en sanar. El tratamiento consiste en la inmovilización del tendón con ange férula o abrazadera y el uso de medicamentos antiinflamatorios.  Cuidados En [place]:  1. Para aliviar el dolor aleksander el primer día, aplique ange bolsa de hielo (cubitos de hielo en ange bolsa de plástico, envuelta en ange toalla) sobre la shelley lesionada aleksander 20 minutos cada 1-2 horas. Continúe esta práctica 3-4 veces al día hasta que el dolor y la hinchazón desaparezcan.  2. Descanse la articulación inflamada y protéjala del  movimiento.  3. Puede lexie ibuprofeno (Motrin o Advil) o naproxeno (Aleve o Naprosyn) para aliviar el dolor y la inflamación, a menos que le hayan recetado otro medicamento. Si no puede lexie estos medicamentos, el acetaminofén (Tylenol) puede ayudarle a aliviar el dolor, demarcus no reducirá la inflamación. [ NOTA : Si tiene ange enfermedad hepática o renal crónica, o ha tenido alguna vez ange úlcera estomacal o sangrado gastrointestinal, consulte con schafer médico antes de lexie estos medicamentos.]  4. A medida que mejoran astrid síntomas, comience a  gradualmente la articulación afectada.  Programe ange VISITA DE CONTROL con schafer médico si no mejora al cabo de miguelangel días de tratamiento.  Busque Prontamente Atención Médica  si algo de lo siguiente ocurre:    Enrojecimiento sobre la shelley dolorida    Dolor o hinchazón crecientes en la articulación    Fiebre de 100.4 F (38 C) o más josé antonio, o andie le haya indicado schafer proveedor de atención médica  Date Last Reviewed: 11/21/2015 2000-2017 [employer] [place]. [address] [place] [id number]. Todos los derechos reservados. Esta información no pretende sustituir la atención médica profesional. Sólo schafer médico puede diagnosticar y tratar un problema de jeannette.        Manejo de la dermatitis atópica     Después de bañarse, dese palmaditas suaves en schafer piel para secarla (no se frote). Aplíquese el humectante mientras schafer piel está húmeda.   Para manejar astrid síntomas y ayuda a reducir schafer intensidad y schafer frecuencia, pruebe estos consejos de cuidado personal:  Cuide schafer piel    Báñese con un limpiador suave sin perfume (o un limpiador que no tenga jabón). Enjuague tyler, y séquese dando suaves golpecitos.    Glorieta lynne tibios, no calientes, en la cele.    Aplíquese el humectante libremente maral después de bañarse, cuando todavía tenga la piel húmeda.    Evite rascarse, porque puede dañarle aún más la piel.     Puede usar hidrocortisona tópica, de venta jana, para ayudar a  aliviar los síntomas leves.   Controle schafer entorno    Evite temperaturas extremas, tanto frío andie calor.    Evite el aire muy húmedo o muy seco.    Si en schafer casa o schafer oficina, el aire es muy seco, use un humidificador.    Evite los alérgenos, medardo andie el polvo, que puede estar presente en la ropa de cama, las alfombras, los juguetes de plush, o los tapetes.    Tenga presente que el pelo y la caspa de los animales pueden causar erupciones.  Busque tratamiento médico  Otra opción para mantener los síntomas bajo control es buscar un tratamiento médico. Hable con schafer proveedor de atención médica sobre el tipo de tratamiento que puede funcionar mejor para usted. Puede que le indiquen un tratamiento tópico para que se aplique sobre la piel a diario. También puede que le receten medicamentos orales (que se lupe por boca). Entre los medicamentos que quizás le indiquen están los antihistamínicos, los antibióticos o los corticosteroides. Puede que necesite aplicarse algunas inyecciones para aliviar los síntomas, y que incluso necesite antibióticos si tiene infecciones en la piel. No todos los tratamientos funcionan de igual manera para todos los pacientes. Por eso, si los síntomas persisten o empeoran, pregunte a schafer proveedor de atención médica sobre otros tratamientos.  Decisiones sobre el estilo de vincenzo    Maneje el estrés en schafer vincenzo.    Póngase ropa holgada de algodón que no le apriete o roce la piel.    Evite el contacto con la kaykay u otras telas que pican.    Use productos sin perfume.  Para obtener buenos resultados  Ahora que sabe más sobre la dermatitis atópica, el próximo paso depende de usted. Siga el plan de tratamiento que le haya dado schafer proveedor de atención médica y schafer rutina de cuidado personal. Frazer le ayudará a mantener la dermatitis atópica bajo control. Si astrid síntomas persisten, asegúrese de informar a schafer proveedor de atención médica.   Date Last Reviewed: 10/14/2013    [id number] [employer] [address] [place] [address]. Todos los derechos reservados. Esta información no pretende sustituir la atención médica profesional. Sólo schafer médico puede diagnosticar y tratar un problema de jeannette.         Qué son las afecciones de seno benignas?  La mayoría de las enfermedades de seno son benignas (no cancerosas), por lo cual no le causarán daños serios. Andres todas las mujeres corren cierto andreea de riesgo de presentar cáncer de seno. Gabriela riesgo se va incrementando con la edad. Por esta razón, si observa cualquier cambio que no le parezca normal, consulte a schafer proveedor de atención médica. De gabriela modo, se asegurará un tratamiento adecuado en miya de existir algún problema.  Infección de los senos  Las infecciones del tejido de los senos pueden causar enrojecimiento, calor, dolor o sensibilidad de la piel. La infección más común es la mastitis. Esta inflamación de las glándulas mamarias puede presentarse aleksander la lactancia. La mastitis y otras infecciones de los senos generalmente se tratan con antibióticos.  Secreción de los pezones  Muchas mujeres pueden presentar keisha pequeña secreción de líquido emi o lechoso a través de mian o ambos pezones al presionarlos. Esta secreción puede ser normal. Otros tipos de secreción pueden ser síntomas de keisha afección de seno. Keisha secreción oscura puede ser causada por un papiloma intraductal (crecimiento vijaya en un conducto cerca del pezón). Cualquier secreción oscura o sanguinolenta a través del pezón, o que se produzca sin presionarlo, deberá ser examinada por schafer proveedor de atención médica.    Cambios fibroquísticos  Estos cambios benignos pueden causar un engrosamiento de los senos que puede producir sensibilidad y dolor. Además, los senos pueden sentirse más densos en algunas zonas que en otras. Algunas veces también se pueden formar nódulos sólidos o llenos de fluido (quistes). Los quistes pueden ser lisos, blandos o firmes, y sensibles. Pueden  aumentar de tamaño y volverse más sensibles maral antes de schafer menstruación.    Nódulos de seno benignos  Los nódulos de seno benignos se presentan en todas las formas, texturas y tamaños. Un fibroadenoma (nódulo de tejido fibroso) puede ser liso, firme y flexible. Por lo general, es indoloro y móvil. Pídale a schafer proveedor de atención médica que le examine cualquier nódulo que tenga.  Date Last Reviewed: 8/13/2015 2000-2017 [employer]. [address] [place] [address], [place] [address]. Todos los derechos reservados. Esta información no pretende sustituir la atención médica profesional. Sólo schafer médico puede diagnosticar y tratar un problema de jeannette.                Follow-ups after your visit        Future tests that were ordered for you today     Open Future Orders        Priority Expected Expires Ordered    MA Diagnostic Digital Bilateral Routine  11/30/2018 11/30/2017    US Breast Left Complete 4 Quadrants Routine  11/30/2018 11/30/2017            Who to contact     If you have questions or need follow up information about today's clinic visit or your schedule please contact [facility] directly at [phone].  Normal or non-critical lab and imaging results will be communicated to you by CarWoo!hart, letter or phone within 4 business days after the clinic has received the results. If you do not hear from us within 7 days, please contact the clinic through CarWoo!hart or phone. If you have a critical or abnormal lab result, we will notify you by phone as soon as possible.  Submit refill requests through WaveCheck or call your pharmacy and they will forward the refill request to us. Please allow 3 business days for your refill to be completed.          Additional Information About Your Visit        WaveCheck Information     WaveCheck is an electronic gateway that provides easy, online access to your medical records. With WaveCheck, you can request a clinic appointment, read your test results, renew a  prescription or communicate with your care team.     To sign up for [facility] visit the website at [email or web address]   You will be asked to enter the access code listed below, as well as some personal information. Please follow the directions to create your username and password.     Your access code is: [email or web address]  Expires: 2017  9:03 AM     Your access code will  in 90 days. If you need help or a new code, please contact your [facility] or call [phone] for assistance.        Care EveryWhere ID     This is your Care EveryWhere ID. This could be used by other organizations to access your [facility] medical records  [id number]        Your Vitals Were     Pulse Temperature Pulse Oximetry Breastfeeding? BMI (Body Mass Index)       68 96.6  F (35.9  C) (Temporal) 100% Yes 21.16 kg/m2        Blood Pressure from Last 3 Encounters:   17 98/50   17 110/75   17 104/62    Weight from Last 3 Encounters:   17 115 lb 11.2 oz (52.5 kg)   17 116 lb 6.4 oz (52.8 kg)   17 120 lb (54.4 kg)               Primary Care Provider Office Phone # Fax #    [facility] [phone]       [address]  [facility] [address]        Equal Access to Services     IRAM GORDON AH: Hadii aad ku hadasho Soomaali, waaxda luqadaha, qaybta kaalmada adeegyada, asim brock. So [facility] [phone].    ATENCIÓN: Si habla español, tiene a schafer disposición servicios gratuitos de asistencia lingüística. Llame al [phone].    We comply with applicable federal civil rights laws and [place] laws. We do not discriminate on the basis of race, color, national origin, age, disability, sex, sexual orientation, or gender identity.            Thank you!     Thank you for choosing [facility]  for your care. Our goal is always to provide you with excellent care. Hearing back from our patients  is one way we can continue to improve our services. Please take a few minutes to complete the written survey that you may receive in the mail after your visit with us. Thank you!             Your Updated Medication List - Protect others around you: Learn how to safely use, store and throw away your medicines at www.disposemymeds.org.          This list is accurate as of: 11/30/17  9:57 AM.  Always use your most recent med list.                   Brand Name Dispense Instructions for use Diagnosis    etonogestrel 68 MG Impl    IMPLANON/NEXPLANON     1 each (68 mg) by Subdermal route continuous    Pregnancy examination or test, pregnancy unconfirmed, Nexplanon insertion       ZATEAN-PN DHA 27-0.6-0.4-300 MG Caps

## 2017-12-04 ENCOUNTER — RADIANT APPOINTMENT (OUTPATIENT)
Dept: ULTRASOUND IMAGING | Facility: CLINIC | Age: 32
End: 2017-12-04
Attending: NURSE PRACTITIONER
Payer: COMMERCIAL

## 2017-12-04 ENCOUNTER — RADIANT APPOINTMENT (OUTPATIENT)
Dept: MAMMOGRAPHY | Facility: CLINIC | Age: 32
End: 2017-12-04
Attending: NURSE PRACTITIONER
Payer: COMMERCIAL

## 2017-12-04 DIAGNOSIS — N63.25 BREAST LUMP ON LEFT SIDE AT 6 O'CLOCK POSITION: ICD-10-CM

## 2017-12-04 PROCEDURE — G0204 DX MAMMO INCL CAD BI: HCPCS | Performed by: RADIOLOGY

## 2017-12-04 PROCEDURE — 76642 ULTRASOUND BREAST LIMITED: CPT | Mod: LT | Performed by: RADIOLOGY

## 2018-04-09 ENCOUNTER — OFFICE VISIT (OUTPATIENT)
Dept: PEDIATRICS | Facility: CLINIC | Age: 33
End: 2018-04-09
Payer: COMMERCIAL

## 2018-04-09 VITALS
OXYGEN SATURATION: 98 % | DIASTOLIC BLOOD PRESSURE: 62 MMHG | WEIGHT: 117.4 LBS | BODY MASS INDEX: 23.67 KG/M2 | HEART RATE: 82 BPM | TEMPERATURE: 98 F | HEIGHT: 59 IN | SYSTOLIC BLOOD PRESSURE: 98 MMHG

## 2018-04-09 DIAGNOSIS — N93.8 DUB (DYSFUNCTIONAL UTERINE BLEEDING): Primary | ICD-10-CM

## 2018-04-09 DIAGNOSIS — M54.50 MIDLINE LOW BACK PAIN WITHOUT SCIATICA, UNSPECIFIED CHRONICITY: ICD-10-CM

## 2018-04-09 LAB
ERYTHROCYTE [DISTWIDTH] IN BLOOD BY AUTOMATED COUNT: 11.7 % (ref 10–15)
HCT VFR BLD AUTO: 37.7 % (ref 35–47)
HGB BLD-MCNC: 12.6 G/DL (ref 11.7–15.7)
MCH RBC QN AUTO: 31.3 PG (ref 26.5–33)
MCHC RBC AUTO-ENTMCNC: 33.4 G/DL (ref 31.5–36.5)
MCV RBC AUTO: 94 FL (ref 78–100)
PLATELET # BLD AUTO: 353 10E9/L (ref 150–450)
RBC # BLD AUTO: 4.02 10E12/L (ref 3.8–5.2)
WBC # BLD AUTO: 9.6 10E9/L (ref 4–11)

## 2018-04-09 PROCEDURE — 36415 COLL VENOUS BLD VENIPUNCTURE: CPT | Performed by: NURSE PRACTITIONER

## 2018-04-09 PROCEDURE — 85027 COMPLETE CBC AUTOMATED: CPT | Performed by: NURSE PRACTITIONER

## 2018-04-09 PROCEDURE — 99213 OFFICE O/P EST LOW 20 MIN: CPT | Performed by: NURSE PRACTITIONER

## 2018-04-09 NOTE — NURSING NOTE
"Chief Complaint   Patient presents with     Abnormal Uterine Bleeding       Initial BP 98/62 (BP Location: Right arm, Patient Position: Chair, Cuff Size: Adult Regular)  Pulse 82  Temp 98  F (36.7  C) (Temporal)  Ht 4' 11.06\" (1.5 m)  Wt 117 lb 6.4 oz (53.3 kg)  LMP 03/06/2018 (Exact Date)  SpO2 98%  BMI 23.67 kg/m2 Estimated body mass index is 23.67 kg/(m^2) as calculated from the following:    Height as of this encounter: 4' 11.06\" (1.5 m).    Weight as of this encounter: 117 lb 6.4 oz (53.3 kg).  Medication Reconciliation: complete     Mago Melendez CMA      "

## 2018-04-09 NOTE — PROGRESS NOTES
SUBJECTIVE:   Jo Ann Patel is a 33 year old female who presents to clinic today for the following health issues:    Jo Ann is in clinic today - she has had her period for about 1 months now and would like to get checked up to make sure everything is ok. Pt states that it goes from light to heavy and back and forth. Some discomfort lower left abdomin and some left leg pain. Jo Ann states she is also very tired.    Has has vaginal bleeding for a month straight  Has the Nexplanon in place since September  Never had any issue like this before   Has some mild lower abdomen pain and now is gone     Also having some low back pain as well. Used to have sciatica in the past   She has not been working now for 2.5 years   The case has been for this injury at work for over a year   Has a small child at home now and this pain is from repetitive use at home and would benefit from some physical therapy       Problem list and histories reviewed & adjusted, as indicated.  Additional history: as documented    Patient Active Problem List   Diagnosis   (none) - all problems resolved or deleted     Past Surgical History:   Procedure Laterality Date     APPENDECTOMY        SECTION N/A 7/3/2017    Procedure:  SECTION;  Primary  Section ;  Surgeon: Luz Maria Kwan MD;  Location: [facility] L[facility]     HC INSERTION INTRAUTERINE DEVICE       HC REMOVE INTRAUTERINE DEVICE         Social History   Substance Use Topics     Smoking status: Never Smoker     Smokeless tobacco: Never Used     Alcohol use No     No family history on file.      Current Outpatient Prescriptions   Medication Sig Dispense Refill     etonogestrel (IMPLANON/NEXPLANON) 68 MG IMPL 1 each (68 mg) by Subdermal route continuous  0     Prenat w/o I-PE-Adrnwhd-FA-DHA (ZATEAN-PN DHA) 27-0.6-0.4-300 MG CAPS        No Known Allergies  Labs reviewed in EPIC    Reviewed and updated as needed this visit by clinical staff       Reviewed  "and updated as needed this visit by Provider         ROS:  Constitutional, HEENT, cardiovascular, pulmonary, gi and gu systems are negative, except as otherwise noted.    OBJECTIVE:     BP 98/62 (BP Location: Right arm, Patient Position: Chair, Cuff Size: Adult Regular)  Pulse 82  Temp 98  F (36.7  C) (Temporal)  Ht 4' 11.06\" (1.5 m)  Wt 117 lb 6.4 oz (53.3 kg)  LMP 03/06/2018 (Exact Date)  SpO2 98%  BMI 23.67 kg/m2  Body mass index is 23.67 kg/(m^2).  GENERAL APPEARANCE: alert, active and no distress  RESP: lungs clear to auscultation - no rales, rhonchi or wheezes  CV: regular rates and rhythm and no murmur, click or rub  ABDOMEN: soft, non-tender  MS: extremities normal- no gross deformities noted  Lumbosacral spine area reveals no local tenderness or mass.  Full and painless lumbosacral range of motion is noted. Straight leg raise is negative at 90 degrees on both sides.  PSYCH: mentation appears normal and affect normal/bright    Diagnostic Test Results:  Results for orders placed or performed in visit on 04/09/18   CBC with platelets   Result Value Ref Range    WBC 9.6 4.0 - 11.0 10e9/L    RBC Count 4.02 3.8 - 5.2 10e12/L    Hemoglobin 12.6 11.7 - 15.7 g/dL    Hematocrit 37.7 35.0 - 47.0 %    MCV 94 78 - 100 fl    MCH 31.3 26.5 - 33.0 pg    MCHC 33.4 31.5 - 36.5 g/dL    RDW 11.7 10.0 - 15.0 %    Platelet Count 353 150 - 450 10e9/L     Recent Results (from the past 744 hour(s))   US Pelvic Complete w Transvaginal    Narrative    EXAMINATION: TEMPORARY, 4/12/2018 9:30 AM     COMPARISON: None.    HISTORY: Dysfunctional uterine bleeding. Last menstrual period  3/6/2018.    TECHNIQUE: The pelvis was scanned in standard fashion with  transabdominal and transvaginal transducer(s) using both grey scale  and color Doppler techniques.    FINDINGS:  The uterus measures 7.1 x 2.9 x 4.3 cm, and there is no evidence of a  focal fibroid.  The endometrium is within normal limits and measures 4  mm. Small amount of " fluid within the low uterus. There is no free  fluid in the pelvis.    The right ovary measures   2.4 x 2.0 x 2.8  and the left ovary  measures 2.7 x 1.9 x 1.8. There is no adnexal mass. There is normal  blood flow to the ovaries with low resistance waveforms.        Impression    IMPRESSION:   1.  Small amount of fluid, likely blood within the lower uterus.  Otherwise, normal pelvic ultrasound.    I have personally reviewed the examination and initial interpretation  and I agree with the findings.    OSMAR TITUS MD       ASSESSMENT/PLAN:   Jo Ann was seen today for abnormal uterine bleeding.    Diagnoses and all orders for this visit:    DUB (dysfunctional uterine bleeding)  -     US Pelvic Complete w Transvaginal; Future  -     CBC with platelets    Midline low back pain without sciatica, unspecified chronicity  -     PHYSICAL THERAPY REFERRAL    PLAN:   Patient needs to follow up in if no improvement,or sooner if worsening of symptoms or other symptoms develop.  FURTHER TESTING:       - pelvic ultrasound  I will place order. Please call [phone] to schedule.  Will follow up and/or notify patient on results via phone or mail to determine further need for followup  CONSULTATION/REFERRAL to physical therapy  See Patient Instructions    TREVA Valladares [facility] [address]

## 2018-04-09 NOTE — MR AVS SNAPSHOT
After Visit Summary   4/9/2018    Jo Ann Patel    MRN: [id number]           Patient Information     Date Of Birth          1985        Visit Information        Provider Department      4/9/2018 4:45 PM Esmer Garcia, TREVA CNP; [employer] [facility]        Today's Diagnoses     DUB (dysfunctional uterine bleeding)    -  1    Midline low back pain without sciatica, unspecified chronicity          Care Instructions    PLAN:   1.   Symptomatic therapy suggested:   referral to Physical Therapy    2.  Orders Placed This Encounter   Procedures     US Pelvic Complete w Transvaginal     CBC with platelets     PHYSICAL THERAPY REFERRAL       3. Patient needs to follow up in if no improvement,or sooner if worsening of symptoms or other symptoms develop.  FURTHER TESTING:       - pelvic ultrasound  I will place order. Please call [phone] to schedule.  Will follow up and/or notify patient on results via phone or mail to determine further need for followup  CONSULTATION/REFERRAL to physical therapy    It was a pleasure seeing you today at the [facility]. Thank you for allowing us to care for you today. We truly hope we provided you with the excellent service you deserve. Please let us know if there is anything else we can do for you so we can be sure you are leaving completley satisfied with your care experience.       General information about your clinic   Clinic Hours Lab Hours (Appointments are required)   Mon-Thurs: 7:30 AM - 7 PM Mon-Thurs: 7:30 AM - 7 PM   Fri: 7:30 AM - 5 PM Fri: 7:30 AM - 5 PM        After Hours Nurse Advise & Appts:  [facility]ing Nurse Advisors: [phone]  Jing On Call: to make appointments anytime: [phone] On Call Physician: call [phone] and answering service will page the on call physician.        For urgent appointments, please call [phone] and ask for the triage nurse or  your care team clinic nurse.  How to contact my care team:  Michele: [email or web address]   Phone: [phone]   Fax: [phone]       [facility]:   Phone: [phone]  Hours: 8:00 AM - 6:00 PM  Medication Refills:  Call your pharmacy and they will forward the refill to us. Please allow 3 business days for your refills to be completed.       Normal or non-critical lab and imaging results will be communicated to you by MyChart, letter or phone within 7 days.  If you do not hear from us within 10 days, please call the clinic. If you have a critical or abnormal lab result, we will notify you by phone as soon as possible.       We now have PWIC (Pediatric Walk in Care)  Monday-Friday from 7:30-4. Simply walk in and be seen for your urgent needs like cough, fever, rash, diarrhea or vomiting, pink eye, UTI. No appointments needed. Ask one of the team for more information      -Your Care Team:    Dr. Myron Ibrahim - Internal Medicine/Pediatrics   Dr. Robson Wilson - Family Medicine  Dr. Danette Sow - Pediatrics  Dr. Taylor Mendoza - Pediatrics  Esmer Garcia CNP - Family Practice Nurse Practitioner                           Follow-ups after your visit        Additional Services     PHYSICAL THERAPY REFERRAL       *This therapy referral will be filtered to a centralized scheduling office at [facility] and the patient will receive a call to schedule an appointment at a [facility] location most convenient for them. *     [facility] provides Physical Therapy evaluation and treatment and many specialty services across the [place] [facility] system.  If requesting a specialty program, please choose from the list below.    If you have not heard from the scheduling office within 2 business days, please call [phone] for all locations, with the exception of [facility], please call [phone] and [facility], please call [phone]  Treatment: Evaluation & Treatment  Special  "Instructions/Modalities:   Special Programs: None    Please be aware that coverage of these services is subject to the terms and limitations of your health insurance plan.  Call member services at your health plan with any benefit or coverage questions.      **Note to Provider:  If you are referring outside of [facility] for the therapy appointment, please list the name of the location in the \"special instructions\" above, print the referral and give to the patient to schedule the appointment.                  Future tests that were ordered for you today     Open Future Orders        Priority Expected Expires Ordered    US Pelvic Complete w Transvaginal Routine  4/9/2019 4/9/2018            Who to contact     If you have questions or need follow up information about today's clinic visit or your schedule please contact [facility] directly at [phone].  Normal or non-critical lab and imaging results will be communicated to you by MyChart, letter or phone within 4 business days after the clinic has received the results. If you do not hear from us within 7 days, please contact the clinic through MyChart or phone. If you have a critical or abnormal lab result, we will notify you by phone as soon as possible.  Submit refill requests through Reveal or call your pharmacy and they will forward the refill request to us. Please allow 3 business days for your refill to be completed.          Additional Information About Your Visit        Reveal Information     Reveal is an electronic gateway that provides easy, online access to your medical records. With Reveal, you can request a clinic appointment, read your test results, renew a prescription or communicate with your care team.     To sign up for Reveal visit the website at [email or web address]   You will be asked to enter the access code listed below, as well as some personal information. Please follow the directions to create your username and " "password.     Your access code is: [email or web address]  Expires: 2018  5:22 PM     Your access code will  in 90 days. If you need help or a new code, please contact your [facility] or call [phone] for assistance.        Care EveryWhere ID     This is your Care EveryWhere ID. This could be used by other organizations to access your [facility] medical records  [id number]        Your Vitals Were     Pulse Temperature Height Last Period Pulse Oximetry BMI (Body Mass Index)    82 98  F (36.7  C) (Temporal) 4' 11.06\" (1.5 m) 2018 (Exact Date) 98% 23.67 kg/m2       Blood Pressure from Last 3 Encounters:   18 98/62   17 98/50   17 110/75    Weight from Last 3 Encounters:   18 117 lb 6.4 oz (53.3 kg)   17 115 lb 11.2 oz (52.5 kg)   17 116 lb 6.4 oz (52.8 kg)              We Performed the Following     CBC with platelets     PHYSICAL THERAPY REFERRAL        Primary Care Provider Office Phone # Fax #    [facility] [phone]       [address]  [facility] [address]        Equal Access to Services     IRAM GORDON : Hadii aad ku hadasho Soomaali, waaxda luqadaha, qaybta kaalmada adeegyada, asim valerio hayhari unger . So [facility] [phone].    ATENCIÓN: Si habla español, tiene a schafer disposición servicios gratuitos de asistencia lingüística. Llame al [phone].    We comply with applicable federal civil rights laws and [place] laws. We do not discriminate on the basis of race, color, national origin, age, disability, sex, sexual orientation, or gender identity.            Thank you!     Thank you for choosing [facility]  for your care. Our goal is always to provide you with excellent care. Hearing back from our patients is one way we can continue to improve our services. Please take a few minutes to complete the written survey that you may receive in the mail after your " visit with us. Thank you!             Your Updated Medication List - Protect others around you: Learn how to safely use, store and throw away your medicines at www.disposemymeds.org.          This list is accurate as of 4/9/18  5:22 PM.  Always use your most recent med list.                   Brand Name Dispense Instructions for use Diagnosis    etonogestrel 68 MG Impl    IMPLANON/NEXPLANON     1 each (68 mg) by Subdermal route continuous    Pregnancy examination or test, pregnancy unconfirmed, Nexplanon insertion       ZATEAN-PN DHA 27-0.6-0.4-300 MG Caps

## 2018-04-09 NOTE — LETTER
April 9, 2018      Jo Ann Patel                                                                [address]   [place] [address]          Dear Jo Ann,    The results of your recent  -Normal red blood cell (hgb) levels, normal white blood cell count and normal platelet levels.    Results for orders placed or performed in visit on 04/09/18   CBC with platelets   Result Value Ref Range    WBC 9.6 4.0 - 11.0 10e9/L    RBC Count 4.02 3.8 - 5.2 10e12/L    Hemoglobin 12.6 11.7 - 15.7 g/dL    Hematocrit 37.7 35.0 - 47.0 %    MCV 94 78 - 100 fl    MCH 31.3 26.5 - 33.0 pg    MCHC 33.4 31.5 - 36.5 g/dL    RDW 11.7 10.0 - 15.0 %    Platelet Count 353 150 - 450 10e9/L       Please make a follow-up appointment if you have additional questions.    Sincerely,       Esmer Garcia, NP, APRN CNP

## 2018-04-09 NOTE — PATIENT INSTRUCTIONS
PLAN:   1.   Symptomatic therapy suggested:   referral to Physical Therapy    2.  Orders Placed This Encounter   Procedures     US Pelvic Complete w Transvaginal     CBC with platelets     PHYSICAL THERAPY REFERRAL       3. Patient needs to follow up in if no improvement,or sooner if worsening of symptoms or other symptoms develop.  FURTHER TESTING:       - pelvic ultrasound  I will place order. Please call [phone] to schedule.  Will follow up and/or notify patient on results via phone or mail to determine further need for followup  CONSULTATION/REFERRAL to physical therapy    It was a pleasure seeing you today at the [facility]. Thank you for allowing us to care for you today. We truly hope we provided you with the excellent service you deserve. Please let us know if there is anything else we can do for you so we can be sure you are leaving completley satisfied with your care experience.       General information about your clinic   Clinic Hours Lab Hours (Appointments are required)   Mon-Thurs: 7:30 AM - 7 PM Mon-Thurs: 7:30 AM - 7 PM   Fri: 7:30 AM - 5 PM Fri: 7:30 AM - 5 PM        After Hours Nurse Advise & Appts:  Jing Nurse Advisors: [phone]  Jing On Call: to make appointments anytime: [phone] On Call Physician: call [phone] and answering service will page the on call physician.        For urgent appointments, please call [phone] and ask for the triage nurse or your care team clinic nurse.  How to contact my care team:  [facility]: [email or web address]   Phone: [phone]   Fax: [phone]       [facility]:   Phone: [phone]  Hours: 8:00 AM - 6:00 PM  Medication Refills:  Call your pharmacy and they will forward the refill to us. Please allow 3 business days for your refills to be completed.       Normal or non-critical lab and imaging results will be communicated to you by MyChart, letter or phone within 7 days.  If you do not  hear from us within 10 days, please call the clinic. If you have a critical or abnormal lab result, we will notify you by phone as soon as possible.       We now have PWIC (Pediatric Walk in Care)  Monday-Friday from 7:30-4. Simply walk in and be seen for your urgent needs like cough, fever, rash, diarrhea or vomiting, pink eye, UTI. No appointments needed. Ask one of the team for more information      -Your Care Team:    Dr. Myron Ibrahim - Internal Medicine/Pediatrics   Dr. Robson Wilson - Family Medicine  Dr. Danette Sow - Pediatrics  Dr. Taylor Mendoza - Pediatrics  Esmer Garcia CNP - Family Practice Nurse Practitioner

## 2018-04-12 ENCOUNTER — RADIANT APPOINTMENT (OUTPATIENT)
Dept: ULTRASOUND IMAGING | Facility: CLINIC | Age: 33
End: 2018-04-12
Attending: NURSE PRACTITIONER
Payer: COMMERCIAL

## 2018-04-12 DIAGNOSIS — N93.8 DUB (DYSFUNCTIONAL UTERINE BLEEDING): ICD-10-CM

## 2018-04-12 PROCEDURE — 76830 TRANSVAGINAL US NON-OB: CPT | Performed by: RADIOLOGY

## 2018-04-12 PROCEDURE — 76856 US EXAM PELVIC COMPLETE: CPT | Performed by: RADIOLOGY

## 2018-04-13 ENCOUNTER — TELEPHONE (OUTPATIENT)
Dept: PEDIATRICS | Facility: CLINIC | Age: 33
End: 2018-04-13

## 2018-04-13 NOTE — TELEPHONE ENCOUNTER
Attempt #1    Left message for patient to return call to clinic.  Clinic number given.    Velia Salazar CMA

## 2018-04-13 NOTE — TELEPHONE ENCOUNTER
US Pelvic Complete w Transvaginal   Status:  Final result   Visible to patient:  No (Not Released) Dx:  DUB (dysfunctional uterine bleeding) Order: [id number]       Notes Recorded by Esmer Garcia APRN CNP on 4/12/2018 at 10:18 PM  Please call   Pelvic ultrasound is normal   Make a follow up appointment with GYN   Esmer Garcia NP, TREVA CNP

## 2018-04-16 NOTE — TELEPHONE ENCOUNTER
Patient advised of information below per Evon Garcia.  Patient stated understanding.  Patient transferred to the call center to schedule an appointment with [facility]'s [facility] .     Message routed to Evon Garcia for referral to [facility] for DUB.    Velia Salazar CMA

## 2018-04-17 ENCOUNTER — THERAPY VISIT (OUTPATIENT)
Dept: PHYSICAL THERAPY | Facility: CLINIC | Age: 33
End: 2018-04-17
Payer: COMMERCIAL

## 2018-04-17 DIAGNOSIS — M54.42 CHRONIC BILATERAL LOW BACK PAIN WITH LEFT-SIDED SCIATICA: Primary | ICD-10-CM

## 2018-04-17 DIAGNOSIS — G89.29 CHRONIC BILATERAL LOW BACK PAIN WITH LEFT-SIDED SCIATICA: Primary | ICD-10-CM

## 2018-04-17 PROCEDURE — 97110 THERAPEUTIC EXERCISES: CPT | Mod: GP | Performed by: PHYSICAL THERAPIST

## 2018-04-17 PROCEDURE — 97161 PT EVAL LOW COMPLEX 20 MIN: CPT | Mod: GP | Performed by: PHYSICAL THERAPIST

## 2018-04-17 NOTE — PROGRESS NOTES
[facility] Initial Evaluation  Subjective:  Patient is a 33 year old female presenting with rehab back hpi.   Jo Ann Patel is a 33 year old female with a lumbar condition.      This is a chronic condition  Session conducted with phone .  Pt reports that she has had R sciatic sx for several years but it has bothered more in the last four years but more recently with one of her children in therapy she has been bending and lifting (2018). Saw MD in 2018 for the pain and was referred to PT. .    Patient reports pain:  Lower lumbar spine and lumbar spine right.  Radiates to:  No radiation.  Pain is described as other and is intermittent and reported as 4/10.  Associated symptoms:  Loss of motion/stiffness. Pain is worse in the A.M..  Symptoms are exacerbated by bending and relieved by other (stretching (twisting and bending)).  Since onset symptoms are unchanged.    Previous treatment includes physical therapy (PT in the past for sciatic pain during pregnancy last year).  There was significant improvement following previous treatment.  General health as reported by patient is good.  Pertinent medical history includes:  None.  Medical allergies: no.  Other surgeries include:  Other ().  Current medications:  None as reported by the patient.  Current occupation is housewife.            Red flags:  None as reported by the patient.                        Objective:  Standing Alignment:        Lumbar:  Lordosis decr                           Lumbar/SI Evaluation    Lumbar Myotomes:  normal                                                                           Emmie Lumbar Evaluation    Posture:  Sitting: poor    Lordosis: Reduced        Movement Loss:  Flexion (Flex): nil and pain  Extension (EXT): mod and pain  Side [place] R (SG R): nil and pain  Side [place] L (SG L): pain and nil  Test Movements:        EIL: During: no effect  After: no effect  Mechanical  Response: no effect  Repeat EIL: During: decreases  After: better  Mechanical Response: IncROM        Conclusion: derangement  Principle of Treatment:  Posture Correction: lumbar roll    Extension: EIL 10 reps every 2-3 hrs                                           ROS    Assessment/Plan:    Patient is a 33 year old female with lumbar complaints.    Patient has the following significant findings with corresponding treatment plan.                Diagnosis 1:  Central symmetrical lumbar derangment  Pain -  manual therapy, self management, education, directional preference exercise and home program  Decreased ROM/flexibility - manual therapy, therapeutic exercise, therapeutic activity and home program  Inflammation - self management/home program  Decreased function - therapeutic activities and home program  Impaired posture - neuro re-education, therapeutic activities and home program    Therapy Evaluation Codes:   1) History comprised of:   Personal factors that impact the plan of care:      Language.    Comorbidity factors that impact the plan of care are:      None.     Medications impacting care: None.  2) Examination of Body Systems comprised of:   Body structures and functions that impact the plan of care:      Lumbar spine.   Activity limitations that impact the plan of care are:      Bending and Sitting.  3) Clinical presentation characteristics are:   Stable/Uncomplicated.  4) Decision-Making    Low complexity using standardized patient assessment instrument and/or measureable assessment of functional outcome.  Cumulative Therapy Evaluation is: Low complexity.    Previous and current functional limitations:  (See Goal Flow Sheet for this information)    Short term and Long term goals: (See Goal Flow Sheet for this information)     Communication ability:  Patient has an  for communication clarity.  Treatment Explanation - The following has been discussed with the patient:   RX ordered/plan of  care  Anticipated outcomes  Possible risks and side effects  This patient would benefit from PT intervention to resume normal activities.   Rehab potential is excellent.    Frequency:  2 X week, once daily  Duration:  for 2 weeks  Discharge Plan:  Achieve all LTG.  Independent in home treatment program.  Reach maximal therapeutic benefit.    Please refer to the daily flowsheet for treatment today, total treatment time and time spent performing 1:1 timed codes.

## 2018-04-17 NOTE — MR AVS SNAPSHOT
After Visit Summary   4/17/2018    Jo Ann Patel    MRN: [id number]           Patient Information     Date Of Birth          1985        Visit Information        Provider Department      4/17/2018 8:55 AM Madison Mcguire; Vinh Sanchez, PT; PHONE,  [facility]        Today's Diagnoses     Chronic bilateral low back pain with left-sided sciatica    -  1       Follow-ups after your visit        Your next 10 appointments already scheduled     Apr 20, 2018  6:45 AM CDT   TAYA Spine with Vinh Sanchez PT   [facility] ([facility])    [address]  [facility] [address] [phone]            Apr 25, 2018  2:00 PM CDT   Office Visit with Michelle Lee DO   [facility] ([facility])    [address] [place] [address] [phone]           Bring a current list of meds and any records pertaining to this visit. For Physicals, please bring immunization records and any forms needing to be filled out. Please arrive 10 minutes early to complete paperwork.              Who to contact     If you have questions or need follow up information about today's clinic visit or your schedule please contact [facility] PHYSICAL THERAPY directly at [phone].  Normal or non-critical lab and imaging results will be communicated to you by MyChart, letter or phone within 4 business days after the clinic has received the results. If you do not hear from us within 7 days, please contact the clinic through MyChart or phone. If you have a critical or abnormal lab result, we will notify you by phone as soon as possible.  Submit refill requests through [employer] or call your pharmacy and they will forward the refill request to us. Please allow 3 business days for your refill to be  "completed.          Additional Information About Your Visit        [email or web address] Information     [employer] lets you send messages to your doctor, view your test results, renew your prescriptions, schedule appointments and more. To sign up, go to [email or web address] . Click on \"Log in\" on the left side of the screen, which will take you to the Welcome page. Then click on \"Sign up Now\" on the right side of the page.     You will be asked to enter the access code listed below, as well as some personal information. Please follow the directions to create your username and password.     Your access code is: [email or web address]  Expires: 2018  5:22 PM     Your access code will  in 90 days. If you need help or a new code, please call your [place] clinic or [phone].        Care EveryWhere ID     This is your Care EveryWhere ID. This could be used by other organizations to access your [place] medical records  [id number]         Blood Pressure from Last 3 Encounters:   18 98/62   17 98/50   17 110/75    Weight from Last 3 Encounters:   18 53.3 kg (117 lb 6.4 oz)   17 52.5 kg (115 lb 11.2 oz)   17 52.8 kg (116 lb 6.4 oz)              We Performed the Following     HC PT EVAL, LOW COMPLEXITY     TAYA INITIAL EVAL REPORT     THERAPEUTIC EXERCISES        Primary Care Provider Office Phone # Fax #    [facility] [phone]       [address]  [facility] [address]        Equal Access to Services     [facility] : Hadii aad ku hadasho Soomaali, waaxda luqadaha, qaybta kaalmada nessa, asim brock. So [facility] [phone].    ATENCIÓN: Si habla español, tiene a schafer disposición servicios gratuitos de asistencia lingüística. Llame al [phone].    We comply with applicable federal civil rights laws and [place] laws. We do not discriminate on the basis of race, color, national origin, age, disability, sex, sexual " orientation, or gender identity.            Thank you!     Thank you for choosing [facility] PHYSICAL [facility]  for your care. Our goal is always to provide you with excellent care. Hearing back from our patients is one way we can continue to improve our services. Please take a few minutes to complete the written survey that you may receive in the mail after your visit with us. Thank you!             Your Updated Medication List - Protect others around you: Learn how to safely use, store and throw away your medicines at www.disposemymeds.org.          This list is accurate as of 4/17/18 12:24 PM.  Always use your most recent med list.                   Brand Name Dispense Instructions for use Diagnosis    etonogestrel 68 MG Impl    IMPLANON/NEXPLANON     1 each (68 mg) by Subdermal route continuous    Pregnancy examination or test, pregnancy unconfirmed, Nexplanon insertion       ALEXANDRA-PN DHA 27-0.6-0.4-300 MG Caps

## 2018-04-20 ENCOUNTER — THERAPY VISIT (OUTPATIENT)
Dept: PHYSICAL THERAPY | Facility: CLINIC | Age: 33
End: 2018-04-20
Payer: COMMERCIAL

## 2018-04-20 DIAGNOSIS — M54.42 CHRONIC BILATERAL LOW BACK PAIN WITH LEFT-SIDED SCIATICA: ICD-10-CM

## 2018-04-20 DIAGNOSIS — G89.29 CHRONIC BILATERAL LOW BACK PAIN WITH LEFT-SIDED SCIATICA: ICD-10-CM

## 2018-04-20 PROCEDURE — 97110 THERAPEUTIC EXERCISES: CPT | Mod: GP | Performed by: PHYSICAL THERAPIST

## 2018-04-20 PROCEDURE — 97530 THERAPEUTIC ACTIVITIES: CPT | Mod: GP | Performed by: PHYSICAL THERAPIST

## 2018-04-25 ENCOUNTER — OFFICE VISIT (OUTPATIENT)
Dept: OBGYN | Facility: CLINIC | Age: 33
End: 2018-04-25
Payer: COMMERCIAL

## 2018-04-25 VITALS
OXYGEN SATURATION: 100 % | WEIGHT: 120.2 LBS | BODY MASS INDEX: 24.23 KG/M2 | SYSTOLIC BLOOD PRESSURE: 111 MMHG | HEART RATE: 75 BPM | DIASTOLIC BLOOD PRESSURE: 69 MMHG

## 2018-04-25 DIAGNOSIS — N92.1 BREAKTHROUGH BLEEDING ON NEXPLANON: Primary | ICD-10-CM

## 2018-04-25 DIAGNOSIS — Z97.5 BREAKTHROUGH BLEEDING ON NEXPLANON: Primary | ICD-10-CM

## 2018-04-25 PROCEDURE — 99214 OFFICE O/P EST MOD 30 MIN: CPT | Performed by: OBSTETRICS & GYNECOLOGY

## 2018-04-25 RX ORDER — ESTRADIOL 0.5 MG/1
0.5 TABLET ORAL DAILY
Qty: 90 TABLET | Refills: 3 | Status: SHIPPED | OUTPATIENT
Start: 2018-04-25 | End: 2018-07-24

## 2018-04-25 NOTE — MR AVS SNAPSHOT
After Visit Summary   4/25/2018    Jo Ann Patel    MRN: [id number]           Patient Information     Date Of Birth          1985        Visit Information        Provider Department      4/25/2018 1:45 PM Michelle Lee DO; [employer] [facility]        Care Instructions                                                         If you have any questions regarding your visit, Please contact your care team.    [facility] CLINIC HOURS TELEPHONE NUMBER   Michelle Lee DO.    SHAN Moser -    FRANCOISE Lopez       Monday, Wednesday, Thursday and Friday, [place]  8:30a.m-5:00 p.m   [facility]  [address] [place] [address]  [phone] ask for [facility]    Imaging [phone]       Urgent Care locations:    [facility] Saturday and Sunday   9 am - 5 pm    Monday-Friday   12 pm - 8 pm  Saturday and Sunday   9 am - 5 pm   [phone]     [facility] Labor and Delivery:  [phone]    If you need a medication refill, please contact your pharmacy. Please allow 3 business days for your refill to be completed.  As always, Thank you for trusting us with your healthcare needs!                Follow-ups after your visit        Your next 10 appointments already scheduled     Apr 27, 2018  6:45 AM CDT   TAYA Spine with Vinh Sanchez, PT   [facility] ([facility])    [address]  [facility] [id number] [phone]              Who to contact     If you have questions or need follow up information about today's clinic visit or your schedule please contact [facility] directly at [phone].  Normal or non-critical lab and imaging results will be communicated to you by MyChart, letter or phone within 4 business days after the clinic has  "received the results. If you do not hear from us within 7 days, please contact the clinic through Drop Messages or phone. If you have a critical or abnormal lab result, we will notify you by phone as soon as possible.  Submit refill requests through Drop Messages or call your pharmacy and they will forward the refill request to us. Please allow 3 business days for your refill to be completed.          Additional Information About Your Visit        Drop Messages Information     Drop Messages lets you send messages to your doctor, view your test results, renew your prescriptions, schedule appointments and more. To sign up, go to [email or web address] . Click on \"Log in\" on the left side of the screen, which will take you to the Welcome page. Then click on \"Sign up Now\" on the right side of the page.     You will be asked to enter the access code listed below, as well as some personal information. Please follow the directions to create your username and password.     Your access code is: [email or web address]  Expires: 2018  5:22 PM     Your access code will  in 90 days. If you need help or a new code, please call your [facility] clinic or [phone].        Care EveryWhere ID     This is your Care EveryWhere ID. This could be used by other organizations to access your [facility] medical records  [id number]        Your Vitals Were     Pulse Last Period Pulse Oximetry Breastfeeding? BMI (Body Mass Index)       75 2018 100% Yes 24.23 kg/m2        Blood Pressure from Last 3 Encounters:   18 111/69   18 98/62   17 98/50    Weight from Last 3 Encounters:   18 120 lb 3.2 oz (54.5 kg)   18 117 lb 6.4 oz (53.3 kg)   17 115 lb 11.2 oz (52.5 kg)              Today, you had the following     No orders found for display       Primary Care Provider Office Phone # Fax #    [facility] [phone] [address]  [facility] [address]        Equal Access to Services  "    IRAM GORDON : Hadii aad ike jason Somindy, waaxda luqadaha, qaybta kaalmada aderaegan, asim teodoro alizagabby janeruth annanabel unger . So [facility] [phone].    ATENCIÓN: Si habla español, tiene a schafer disposición servicios gratuitos de asistencia lingüística. Llame al [phone].    We comply with applicable federal civil rights laws and [place] laws. We do not discriminate on the basis of race, color, national origin, age, disability, sex, sexual orientation, or gender identity.            Thank you!     Thank you for choosing [facility]  for your care. Our goal is always to provide you with excellent care. Hearing back from our patients is one way we can continue to improve our services. Please take a few minutes to complete the written survey that you may receive in the mail after your visit with us. Thank you!             Your Updated Medication List - Protect others around you: Learn how to safely use, store and throw away your medicines at www.disposemymeds.org.          This list is accurate as of 4/25/18  2:06 PM.  Always use your most recent med list.                   Brand Name Dispense Instructions for use Diagnosis    etonogestrel 68 MG Impl    IMPLANON/NEXPLANON     1 each (68 mg) by Subdermal route continuous    Pregnancy examination or test, pregnancy unconfirmed, Nexplanon insertion       ZATEAN-PN DHA 27-0.6-0.4-300 MG Caps

## 2018-04-25 NOTE — PROGRESS NOTES
This 32 y/o female, , LMP 18, presents c/o break-through-bleeding while using Nexplanon for contraception.  She had the implant inserted on 17 in her right arm since she is left-handed.  She really likes this method of birth control so does not want it removed until it is due to come out.  A recent US on 18 showed normal results and this information was reviewed with the patient and her Tajik interpretor.  Her abnormal spotting began on 3/6/18 but is not daily.  She denies feeling anemic so will skip a hgb check.  /69  Pulse 75  Wt 120 lb 3.2 oz (54.5 kg)  LMP 2018  SpO2 100%  Breastfeeding? Yes  BMI 24.23 kg/m2  ROS:  10 systems were reviewed and the positives were noted in her problem list.  Her Nexplanon insert was palpated just under the skin of her right upper arm and is in the correct location.  Assessment - wqmar-ftyxxpe-uxwcfkma on Nexplanon  Plan - We discussed treatment options and she prefers to take Estrace daily so this script was sent to her pharmacy.  She does not smoke nor does she have hypertension.  However, if the abnormal bleeding continues, then she is to call/return.  Her Nexplanon is due for removal in 2020.  Instructions were provided and a Tajik interpretor was present throughout today's visit.  This was a 30-minute visit and over 50% of the time was spent in direct pt consultation.

## 2018-04-25 NOTE — PATIENT INSTRUCTIONS
If you have any questions regarding your visit, Please contact your care team.    Women s Health CLINIC HOURS TELEPHONE NUMBER   Michelle Lee DO.    SHAN Moser -    FRANCOISE Lopez       Monday, Wednesday, Thursday and Friday, [place]  8:30a.m-5:00 p.m   [facility]  [address] [place] [address]  [phone] ask for [facility]    Imaging [phone]       Urgent Care locations:    [facility] Saturday and Sunday   9 am - 5 pm    Monday-Friday   12 pm - 8 pm  Saturday and Sunday   9 am - 5 pm   [phone]     [facility] Labor and Delivery:  [phone]    If you need a medication refill, please contact your pharmacy. Please allow 3 business days for your refill to be completed.  As always, Thank you for trusting us with your healthcare needs!

## 2018-04-27 ENCOUNTER — THERAPY VISIT (OUTPATIENT)
Dept: PHYSICAL THERAPY | Facility: CLINIC | Age: 33
End: 2018-04-27
Payer: COMMERCIAL

## 2018-04-27 DIAGNOSIS — G89.29 CHRONIC BILATERAL LOW BACK PAIN WITH LEFT-SIDED SCIATICA: ICD-10-CM

## 2018-04-27 DIAGNOSIS — M54.42 CHRONIC BILATERAL LOW BACK PAIN WITH LEFT-SIDED SCIATICA: ICD-10-CM

## 2018-04-27 PROCEDURE — 97530 THERAPEUTIC ACTIVITIES: CPT | Mod: GP | Performed by: PHYSICAL THERAPIST

## 2018-04-27 PROCEDURE — 97110 THERAPEUTIC EXERCISES: CPT | Mod: GP | Performed by: PHYSICAL THERAPIST

## 2018-04-27 NOTE — MR AVS SNAPSHOT
"              After Visit Summary   4/27/2018    Jo Ann Patel    MRN: [id number]           Patient Information     Date Of Birth          1985        Visit Information        Provider Department      4/27/2018 6:45 AM Vinh Sanchez, PT; [employer] [facility] Physical Therapy        Today's Diagnoses     Chronic bilateral low back pain with left-sided sciatica           Follow-ups after your visit        Your next 10 appointments already scheduled     Apr 30, 2018 10:45 AM CDT   Return Visit with Robson Wilson MD   [facility] ([facility]    [address] [place] [id number] [phone]              Who to contact     If you have questions or need follow up information about today's clinic visit or your schedule please contact [facility] PHYSICAL [facility] directly at [phone].  Normal or non-critical lab and imaging results will be communicated to you by Adbongohart, letter or phone within 4 business days after the clinic has received the results. If you do not hear from us within 7 days, please contact the clinic through Adbongohart or phone. If you have a critical or abnormal lab result, we will notify you by phone as soon as possible.  Submit refill requests through Plannify or call your pharmacy and they will forward the refill request to us. Please allow 3 business days for your refill to be completed.          Additional Information About Your Visit        AdbongoharGaatu Information     Plannify lets you send messages to your doctor, view your test results, renew your prescriptions, schedule appointments and more. To sign up, go to [email or web address] . Click on \"Log in\" on the left side of the screen, which will take you to the Welcome page. Then click on \"Sign up Now\" on the right side of the page.     You will be asked to enter the access code listed " below, as well as some personal information. Please follow the directions to create your username and password.     Your access code is: [email or web address]  Expires: 2018  5:22 PM     Your access code will  in 90 days. If you need help or a new code, please call your [place] clinic or [phone].        Care EveryWhere ID     This is your Care EveryWhere ID. This could be used by other organizations to access your [place] medical records  [id number]        Your Vitals Were     Last Period                   2018            Blood Pressure from Last 3 Encounters:   18 111/69   18 98/62   17 98/50    Weight from Last 3 Encounters:   18 54.5 kg (120 lb 3.2 oz)   18 53.3 kg (117 lb 6.4 oz)   17 52.5 kg (115 lb 11.2 oz)              We Performed the Following     THERAPEUTIC ACTIVITIES     THERAPEUTIC EXERCISES        Primary Care Provider Office Phone # Fax #    [facility] [phone]       [address]  [facility] [address]        Equal Access to Services     IARM GORDON : Hadii aad ku hadasho Soomaali, waaxda luqadaha, qaybta kaalmada adeegyada, waxay missyin haylucilan nneka brock. So [facility] [phone].    ATENCIÓN: Si habla español, tiene a schafer disposición servicios gratuitos de asistencia lingüística. Llame al [phone].    We comply with applicable federal civil rights laws and [place] laws. We do not discriminate on the basis of race, color, national origin, age, disability, sex, sexual orientation, or gender identity.            Thank you!     Thank you for choosing [facility] PHYSICAL THERAPY  for your care. Our goal is always to provide you with excellent care. Hearing back from our patients is one way we can continue to improve our services. Please take a few minutes to complete the written survey that you may receive in the mail after your visit with us. Thank you!              Your Updated Medication List - Protect others around you: Learn how to safely use, store and throw away your medicines at www.disposemymeds.org.          This list is accurate as of 4/27/18  7:45 AM.  Always use your most recent med list.                   Brand Name Dispense Instructions for use Diagnosis    estradiol 0.5 MG tablet    ESTRACE    90 tablet    Take 1 tablet (0.5 mg) by mouth daily    Breakthrough bleeding on Nexplanon       etonogestrel 68 MG Impl    IMPLANON/NEXPLANON     1 each (68 mg) by Subdermal route continuous    Pregnancy examination or test, pregnancy unconfirmed, Nexplanon insertion       ZATEAN-PN DHA 27-0.6-0.4-300 MG Caps

## 2018-04-27 NOTE — PROGRESS NOTES
Subjective:  HPI                    Objective:  System    Physical Exam    General     ROS    Assessment/Plan:    SUBJECTIVE  Subjective changes as noted by pt:  Performing the pressups since last session a week ago about 2-3x/day. Using better posture. As a result, reports that her sx are better. Less stiffness. Not a lot of pain in back.  Overall about 60% to where she wants to be.       Current pain level: 3/10     Changes in function:  Yes (See Goal flowsheet attached for changes in current functional level)     Adverse reaction to treatment or activity:  None    OBJECTIVE  Changes in objective findings:  Yes, See physical exam section and/or daily flowsheet for response to repeated movements.           ASSESSMENT  Jo Ann continues to require intervention to meet STG and LTG's: PT  Patient's symptoms are resolving.  Response to therapy has shown an improvement in  pain level and function  Progress made towards STG/LTG?  Yes (See Goal flowsheet attached for updates on achievement of STG and LTG)    PLAN  Continue current treatment plan until patient demonstrates readiness to progress to higher level exercises.    PTA/ATC plan:  N/A    Please refer to the daily flowsheet for treatment today, total treatment time and time spent performing 1:1 timed codes.

## 2018-05-08 DIAGNOSIS — Z31.438 ENCOUNTER FOR OTHER GENETIC TESTING OF FEMALE FOR PROCREATIVE MANAGEMENT: ICD-10-CM

## 2018-05-08 DIAGNOSIS — Z82.79 FAMILY HISTORY OF CONGENITAL OR GENETIC CONDITION: Primary | ICD-10-CM

## 2018-05-08 PROCEDURE — 88289 CHROMOSOME STUDY ADDITIONAL: CPT | Performed by: MEDICAL GENETICS

## 2018-05-08 PROCEDURE — 88230 TISSUE CULTURE LYMPHOCYTE: CPT | Performed by: MEDICAL GENETICS

## 2018-05-08 PROCEDURE — 40000803 ZZHCL STATISTIC DNA ISOL HIGH PURITY: Performed by: MEDICAL GENETICS

## 2018-05-08 PROCEDURE — 88264 CHROMOSOME ANALYSIS 20-25: CPT | Performed by: MEDICAL GENETICS

## 2018-05-08 PROCEDURE — 36415 COLL VENOUS BLD VENIPUNCTURE: CPT | Performed by: MEDICAL GENETICS

## 2018-05-08 PROCEDURE — 40000891 ZZHCL STATISTIC CGH PARENTAL FOLLOW UP 81228: Performed by: MEDICAL GENETICS

## 2018-05-15 ENCOUNTER — TELEPHONE (OUTPATIENT)
Dept: OBGYN | Facility: CLINIC | Age: 33
End: 2018-05-15

## 2018-05-15 NOTE — TELEPHONE ENCOUNTER
estradiol (ESTRACE) 0.5 MG tablet 90 tablet 3 4/25/2018  --   Sig: Take 1 tablet (0.5 mg) by mouth daily     Phone call from patient using [employer]  #[id number].     Phone call from patient. Patient reported she has been taking med and her break through bleeding did stop and then started bleeding again on 05-03-18. Patient stated she is getting frustrated that bleeding has not stopped completely. Explained it may take a little more time for the bleeding to stop but unable to determine how long it will take. Attempted to reassure patient that bleeding did stop for 1 1/2 wks but patient was not satisfied. Patient stated she will consult her  and may decide to have Nexplanon removed. Patient will call back if needed. Jessica Calvert RN, BAN

## 2018-05-17 ENCOUNTER — TELEPHONE (OUTPATIENT)
Dept: OBGYN | Facility: CLINIC | Age: 33
End: 2018-05-17

## 2018-05-17 LAB — COPATH REPORT: NORMAL

## 2018-05-17 NOTE — TELEPHONE ENCOUNTER
Patient is calling to schedule for a nexplanon removal. She also has concerns regarding menstrual bleeding. Please reach out with a . Please advise.

## 2018-05-22 PROBLEM — M54.42 CHRONIC BILATERAL LOW BACK PAIN WITH LEFT-SIDED SCIATICA: Status: RESOLVED | Noted: 2018-04-17 | Resolved: 2018-05-22

## 2018-05-22 PROBLEM — G89.29 CHRONIC BILATERAL LOW BACK PAIN WITH LEFT-SIDED SCIATICA: Status: RESOLVED | Noted: 2018-04-17 | Resolved: 2018-05-22

## 2018-05-26 LAB — COPATH REPORT: NORMAL

## 2018-05-30 ENCOUNTER — TELEPHONE (OUTPATIENT)
Dept: OBGYN | Facility: CLINIC | Age: 33
End: 2018-05-30

## 2018-05-30 NOTE — TELEPHONE ENCOUNTER
[facility]    Phone Message    May a detailed message be left on voicemail: yes    Reason for Call: Other: Call to reschedule Nexplanon Removal     Action Taken: Message routed to:  [facility] p [phone]

## 2018-06-08 ENCOUNTER — OFFICE VISIT (OUTPATIENT)
Dept: OBGYN | Facility: CLINIC | Age: 33
End: 2018-06-08
Payer: COMMERCIAL

## 2018-06-08 VITALS
BODY MASS INDEX: 24.35 KG/M2 | WEIGHT: 120.8 LBS | HEART RATE: 85 BPM | OXYGEN SATURATION: 100 % | SYSTOLIC BLOOD PRESSURE: 112 MMHG | DIASTOLIC BLOOD PRESSURE: 70 MMHG

## 2018-06-08 DIAGNOSIS — Z30.46 ENCOUNTER FOR NEXPLANON REMOVAL: Primary | ICD-10-CM

## 2018-06-08 DIAGNOSIS — Z30.013 ENCOUNTER FOR INITIAL PRESCRIPTION OF INJECTABLE CONTRACEPTIVE: ICD-10-CM

## 2018-06-08 PROCEDURE — 11982 REMOVE DRUG IMPLANT DEVICE: CPT | Performed by: OBSTETRICS & GYNECOLOGY

## 2018-06-08 PROCEDURE — 96372 THER/PROPH/DIAG INJ SC/IM: CPT | Performed by: OBSTETRICS & GYNECOLOGY

## 2018-06-08 PROCEDURE — 99214 OFFICE O/P EST MOD 30 MIN: CPT | Mod: 25 | Performed by: OBSTETRICS & GYNECOLOGY

## 2018-06-08 RX ORDER — MEDROXYPROGESTERONE ACETATE 150 MG/ML
150 INJECTION, SUSPENSION INTRAMUSCULAR
Qty: 1 ML | Refills: 3 | OUTPATIENT
Start: 2018-06-08 | End: 2018-11-26

## 2018-06-08 NOTE — PROGRESS NOTES
This 32 y/o female, , presents for removal of her Nexplanon due to spotting issues.  She tried taking Estrace po in addition to use of the Nexplanon but this failed to resolve the issue so she just wants it out and will switch to Depo Provera for contraception.  She tried the Mirena IUD in the past but had this removed after one year due to bleeding issues as well.  She is hoping to have a third child in the future so is not interested in sterilization options.  Her Congolese interpretor was present throughout today's visit.  /70  Pulse 85  Wt 120 lb 12.8 oz (54.8 kg)  LMP 2018  SpO2 100%  Breastfeeding? Yes  BMI 24.35 kg/m2   ROS:  10 systems were reviewed and the positives were listed under problems.  Informed consent was reviewed and obtained for Nexplanon removal.  She lay down on the table and flexed her right arm with her right hand near her right ear (since she is left arm dominant).  The exit site was noted and marked with a purple pen.  The localized area of skin was then cleansed with betadine x 3 swabs and cleaned with an alcohol pad.  The site was then injected with 1% Lidocaine and 4 ccs was used with spillage.  A stab wound was made using a sterile scalpel and the Nexplanon implant was grasped and removed without difficulty.  The hollie was disposed of in correct fashion and Tincture of Benzoin was applied to the peripheral skin.  A steristrip was placed across the incision site followed by a Band-aid and an ACE wrap.  She tolerated the procedure well and there were no complications.  EBL was 1 cc and counts were correct.  Assessment - Nexplanon removal and initiation of Depo Provera for contraception  Plan - She was provided instructions on use of Depo Provera and informed consent was reviewed and obtained.  She was also provided directions on care for her right arm wound site.  This was a 30-minute visit and over 50% of the time was spent in direct pt consultation and 10 minutes  were spent in procedure.

## 2018-06-08 NOTE — MR AVS SNAPSHOT
After Visit Summary   6/8/2018    Jo Ann Patel    MRN: [id number]           Patient Information     Date Of Birth          1985        Visit Information        Provider Department      6/8/2018 2:00 PM Michelle Lee DO; [place] [employer]; PROC RM 1 [facility]        Care Instructions                                                         If you have any questions regarding your visit, Please contact your care team.    [facility] HOURS TELEPHONE NUMBER   Michelle Lee DO.    SHAN Moser -    FRANCOISE Lopez       [facility], [facility]  8:30a.m-5:00 p.m   [facility]  [address] [place] [address]  [phone] ask for [facility]    Imaging [phone]       Urgent Care locations:    [facility] Saturday and Sunday   9 am - 5 pm    Monday-Friday   12 pm - 8 pm  Saturday and Sunday   9 am - 5 pm   [phone]     [facility] Labor and Delivery:  [phone]    If you need a medication refill, please contact your pharmacy. Please allow 3 business days for your refill to be completed.  As always, Thank you for trusting us with your healthcare needs!                Follow-ups after your visit        Who to contact     If you have questions or need follow up information about today's clinic visit or your schedule please contact [facility] directly at [phone].  Normal or non-critical lab and imaging results will be communicated to you by MyChart, letter or phone within 4 business days after the clinic has received the results. If you do not hear from us within 7 days, please contact the clinic through MyChart or phone. If you have a critical or abnormal lab result, we will notify you by phone as soon as possible.  Submit refill requests through [place] [employer] Equipmenthart or call your  pharmacy and they will forward the refill request to us. Please allow 3 business days for your refill to be completed.          Additional Information About Your Visit        Care EveryWhere ID     This is your Care EveryWhere ID. This could be used by other organizations to access your [place] medical records  [id number]        Your Vitals Were     Pulse Last Period Pulse Oximetry Breastfeeding? BMI (Body Mass Index)       85 03/06/2018 100% Yes 24.35 kg/m2        Blood Pressure from Last 3 Encounters:   06/08/18 112/70   04/25/18 111/69   04/09/18 98/62    Weight from Last 3 Encounters:   06/08/18 120 lb 12.8 oz (54.8 kg)   04/25/18 120 lb 3.2 oz (54.5 kg)   04/09/18 117 lb 6.4 oz (53.3 kg)              Today, you had the following     No orders found for display         Today's Medication Changes          These changes are accurate as of 6/8/18  2:27 PM.  If you have any questions, ask your nurse or doctor.               Stop taking these medicines if you haven't already. Please contact your care team if you have questions.     ALEXANDRA-PN DHA 27-0.6-0.4-300 MG Caps   Stopped by:  Michelle Lee,                     Primary Care Provider Office Phone # Fax #    [facility] [phone]       [address]  [facility] [address]        Equal Access to Services     MICHAEL [facility] Madison[facility]QUIN : Hadii delvin ku hadasho Sooscarali, waaxda luqadaha, qaybta kaalmada nessa, asim brock. So [facility] [phone].    ATENCIÓN: Si habla español, tiene a schafer disposición servicios gratuitos de asistencia lingüística. Ralph rojo [phone].    We comply with applicable federal civil rights laws and [place] laws. We do not discriminate on the basis of race, color, national origin, age, disability, sex, sexual orientation, or gender identity.            Thank you!     Thank you for choosing [facility]  for your care. Our goal is always to provide you  with excellent care. Hearing back from our patients is one way we can continue to improve our services. Please take a few minutes to complete the written survey that you may receive in the mail after your visit with us. Thank you!             Your Updated Medication List - Protect others around you: Learn how to safely use, store and throw away your medicines at www.disposemymeds.org.          This list is accurate as of 6/8/18  2:27 PM.  Always use your most recent med list.                   Brand Name Dispense Instructions for use Diagnosis    estradiol 0.5 MG tablet    ESTRACE    90 tablet    Take 1 tablet (0.5 mg) by mouth daily    Breakthrough bleeding on Nexplanon       etonogestrel 68 MG Impl    IMPLANON/NEXPLANON     1 each (68 mg) by Subdermal route continuous    Pregnancy examination or test, pregnancy unconfirmed, Nexplanon insertion

## 2018-06-08 NOTE — PATIENT INSTRUCTIONS
If you have any questions regarding your visit, Please contact your care team.    Women s Health CLINIC HOURS TELEPHONE NUMBER   Michelle Lee DO.    SHAN Moser -    FRANCOISE Lopez       Monday, Wednesday, Thursday and Friday, [place]  8:30a.m-5:00 p.m   [facility]  [address] [place] [address]  [phone] ask for [facility]    Imaging [phone]       Urgent Care locations:    [facility] Saturday and Sunday   9 am - 5 pm    Monday-Friday   12 pm - 8 pm  Saturday and Sunday   9 am - 5 pm   [phone]     [facility] Labor and Delivery:  [phone]    If you need a medication refill, please contact your pharmacy. Please allow 3 business days for your refill to be completed.  As always, Thank you for trusting us with your healthcare needs!

## 2018-06-08 NOTE — NURSING NOTE
The following medication was given:     MEDICATION: Depo Provera 150mg  ROUTE: IM  SITE: RUQ - Gluteus  : LurnQ  LOT #: [id number]  EXP:07/2020  NEXT INJECTION DUE: 8/24/18 - 9/7/18   Provider: Dr. Lee  NDC# [id number]  Shanti Veloz CMA  June 8, 2018 3:15 PM

## 2018-07-24 ENCOUNTER — OFFICE VISIT (OUTPATIENT)
Dept: PEDIATRICS | Facility: CLINIC | Age: 33
End: 2018-07-24
Payer: COMMERCIAL

## 2018-07-24 VITALS
WEIGHT: 119 LBS | OXYGEN SATURATION: 98 % | DIASTOLIC BLOOD PRESSURE: 62 MMHG | BODY MASS INDEX: 23.99 KG/M2 | TEMPERATURE: 97.5 F | SYSTOLIC BLOOD PRESSURE: 94 MMHG | HEART RATE: 88 BPM

## 2018-07-24 DIAGNOSIS — M54.2 CERVICALGIA: Primary | ICD-10-CM

## 2018-07-24 DIAGNOSIS — M62.838 SPASM OF MUSCLE: ICD-10-CM

## 2018-07-24 DIAGNOSIS — M54.12 CERVICAL RADICULOPATHY: ICD-10-CM

## 2018-07-24 DIAGNOSIS — Z91.81 H/O FALL: ICD-10-CM

## 2018-07-24 DIAGNOSIS — M54.42 CHRONIC MIDLINE LOW BACK PAIN WITH LEFT-SIDED SCIATICA: ICD-10-CM

## 2018-07-24 DIAGNOSIS — M25.511 RIGHT SHOULDER PAIN, UNSPECIFIED CHRONICITY: ICD-10-CM

## 2018-07-24 DIAGNOSIS — G89.29 CHRONIC MIDLINE LOW BACK PAIN WITH LEFT-SIDED SCIATICA: ICD-10-CM

## 2018-07-24 PROCEDURE — 99214 OFFICE O/P EST MOD 30 MIN: CPT | Performed by: FAMILY MEDICINE

## 2018-07-24 RX ORDER — METHYLPREDNISOLONE 4 MG
TABLET, DOSE PACK ORAL
Qty: 21 TABLET | Refills: 0 | Status: SHIPPED | OUTPATIENT
Start: 2018-07-24 | End: 2018-08-27

## 2018-07-24 RX ORDER — TIZANIDINE 2 MG/1
2 TABLET ORAL 2 TIMES DAILY PRN
Qty: 60 TABLET | Refills: 0 | Status: SHIPPED | OUTPATIENT
Start: 2018-07-24 | End: 2018-08-27

## 2018-07-24 RX ORDER — DICLOFENAC SODIUM 75 MG/1
75 TABLET, DELAYED RELEASE ORAL 2 TIMES DAILY PRN
Qty: 60 TABLET | Refills: 1 | Status: SHIPPED | OUTPATIENT
Start: 2018-07-24 | End: 2018-08-27

## 2018-07-24 NOTE — MR AVS SNAPSHOT
After Visit Summary   7/24/2018    Jo Ann Patel    MRN: [id number]           Patient Information     Date Of Birth          1985        Visit Information        Provider Department      7/24/2018 11:00 AM Robson Wilson MD; [facility]        Today's Diagnoses     Cervicalgia    -  1    Chronic midline low back pain with left-sided sciatica        Right shoulder pain, unspecified chronicity        Cervical radiculopathy        Spasm of muscle        H/O fall          Care Instructions    start on MEDROL DOSE PACK, TIZANIDINE and DICLOFENAC as prescribed  Start on PT  Follow up if pain is no better in 4 weeks of PT            Follow-ups after your visit        Additional Services     [facility] PT, HAND, AND CHIROPRACTIC REFERRAL       **This order will print in the [facility] Scheduling Office**    Physical Therapy, Hand Therapy and Chiropractic Care are available through:    *[facility]  *[facility]  *[facility]    Call one number to schedule at any of the above locations: [phone].    Your provider has referred you to: Physical Therapy at [facility] or [facility]    Indication/Reason for Referral: AS BELOW  Onset of Illness: 1 YEAR  Therapy Orders: Evaluate and Treat  Special Programs: None  Special Request: None    Vinh Justice      Additional Comments for the Therapist or Chiropractor: NONE    Please be aware that coverage of these services is subject to the terms and limitations of your health insurance plan.  Call member services at your health plan with any benefit or coverage questions.      Please bring the following to your appointment:    *Your personal calendar for scheduling future appointments  *Comfortable clothing                  Your next 10 appointments already scheduled     Aug 27, 2018  8:30 AM CDT   Office Visit with Michelle Lee,    [facility]  ([facility]    [address] [address] [address] [phone]           Bring a current list of meds and any records pertaining to this visit. For Physicals, please bring immunization records and any forms needing to be filled out. Please arrive 10 minutes early to complete paperwork.              Who to contact     If you have questions or need follow up information about today's clinic visit or your schedule please contact [facility] directly at [phone].  Normal or non-critical lab and imaging results will be communicated to you by MyChart, letter or phone within 4 business days after the clinic has received the results. If you do not hear from us within 7 days, please contact the clinic through MyChart or phone. If you have a critical or abnormal lab result, we will notify you by phone as soon as possible.  Submit refill requests through [employer] or call your pharmacy and they will forward the refill request to us. Please allow 3 business days for your refill to be completed.          Additional Information About Your Visit        Care EveryWhere ID     This is your Care EveryWhere ID. This could be used by other organizations to access your [facility] medical records  [id number]        Your Vitals Were     Pulse Temperature Pulse Oximetry BMI (Body Mass Index)          88 97.5  F (36.4  C) (Temporal) 98% 23.99 kg/m2         Blood Pressure from Last 3 Encounters:   07/24/18 94/62   06/08/18 112/70   04/25/18 111/69    Weight from Last 3 Encounters:   07/24/18 119 lb (54 kg)   06/08/18 120 lb 12.8 oz (54.8 kg)   04/25/18 120 lb 3.2 oz (54.5 kg)              We Performed the Following     [facility] PT, HAND, AND CHIROPRACTIC REFERRAL          Today's Medication Changes          These changes are accurate as of 7/24/18 11:47 AM.  If you have any questions, ask your nurse or doctor.               Start taking these medicines.        Dose/Directions    diclofenac 75 MG  EC tablet   Commonly known as:  VOLTAREN   Used for:  Cervicalgia, Chronic midline low back pain with left-sided sciatica, Right shoulder pain, unspecified chronicity, Cervical radiculopathy, Spasm of muscle, H/O fall   Started by:  Robson Wilson MD        Dose:  75 mg   Take 1 tablet (75 mg) by mouth 2 times daily as needed for moderate pain   Quantity:  60 tablet   Refills:  1       methylPREDNISolone 4 MG tablet   Commonly known as:  MEDROL DOSEPAK   Used for:  Cervical radiculopathy, Chronic midline low back pain with left-sided sciatica, Right shoulder pain, unspecified chronicity, Cervicalgia, Spasm of muscle, H/O fall   Started by:  Robson Wilson MD        Follow package instructions   Quantity:  21 tablet   Refills:  0       tiZANidine 2 MG tablet   Commonly known as:  ZANAFLEX   Used for:  Cervicalgia, Chronic midline low back pain with left-sided sciatica, Spasm of muscle, Right shoulder pain, unspecified chronicity, Cervical radiculopathy, H/O fall   Started by:  Robson Wilson MD        Dose:  2 mg   Take 1 tablet (2 mg) by mouth 2 times daily as needed for muscle spasms   Quantity:  60 tablet   Refills:  0            Where to get your medicines      These medications were sent to [facility] [address] [place] [address]  [address] [facility] [address]    Hours:  24-hours Phone:  [phone]     diclofenac 75 MG EC tablet    methylPREDNISolone 4 MG tablet    tiZANidine 2 MG tablet                Primary Care Provider Office Phone # Fax #    [facility] [phone]       [address]  [facility] [address]        Equal Access to Services     IRAM GORDON AH: Hadii delvin augustino Sooscarali, waaxda luqadaha, qaybta kaalmada adeegyada, asim abramsn nneka brock. So [facility] [phone].    ATENCIÓN: Si habla español, tiene a schafer disposición servicios gratuitos de  asistencia lingüística. Ralph al [phone].    We comply with applicable federal civil rights laws and [place] laws. We do not discriminate on the basis of race, color, national origin, age, disability, sex, sexual orientation, or gender identity.            Thank you!     Thank you for choosing [facility]  for your care. Our goal is always to provide you with excellent care. Hearing back from our patients is one way we can continue to improve our services. Please take a few minutes to complete the written survey that you may receive in the mail after your visit with us. Thank you!             Your Updated Medication List - Protect others around you: Learn how to safely use, store and throw away your medicines at www.disposemymeds.org.          This list is accurate as of 7/24/18 11:47 AM.  Always use your most recent med list.                   Brand Name Dispense Instructions for use Diagnosis    diclofenac 75 MG EC tablet    VOLTAREN    60 tablet    Take 1 tablet (75 mg) by mouth 2 times daily as needed for moderate pain    Cervicalgia, Chronic midline low back pain with left-sided sciatica, Right shoulder pain, unspecified chronicity, Cervical radiculopathy, Spasm of muscle, H/O fall       medroxyPROGESTERone 150 MG/ML injection    DEPO-PROVERA    1 mL    Inject 1 mL (150 mg) into the muscle every 3 months    Encounter for initial prescription of injectable contraceptive       methylPREDNISolone 4 MG tablet    MEDROL DOSEPAK    21 tablet    Follow package instructions    Cervical radiculopathy, Chronic midline low back pain with left-sided sciatica, Right shoulder pain, unspecified chronicity, Cervicalgia, Spasm of muscle, H/O fall       tiZANidine 2 MG tablet    ZANAFLEX    60 tablet    Take 1 tablet (2 mg) by mouth 2 times daily as needed for muscle spasms    Cervicalgia, Chronic midline low back pain with left-sided sciatica, Spasm of muscle, Right shoulder pain, unspecified chronicity,  Cervical radiculopathy, H/O fall

## 2018-07-24 NOTE — PROGRESS NOTES
SUBJECTIVE:   Jo Ann Patel is a 33 year old female who presents to clinic today for the following health issues:      Patient is new to the provider, is here with a  and with her 1-year-old daughter.  Patient is complaining of progressively worsening pain in the neck upper back and lower back region radiating to the right arm and left leg with no associated tingling, numbness or weakness of extremities, new onset of bladder or bowel incontinence for more than a year now since she had a fall when she was pregnant.  Patient denies upper or lower extremity weakness, she was not seen for this with providers in the past.    Patient is not currently breast-feeding  She has been using over-the-counter pain medications with no relief of symptoms  Has tried physical therapy for the low back pain and left sciatica when she was pregnant with good resolution of symptoms.   Patient has more occurrences of pain in the neck radiating to the right shoulder and right arm in the past 1 month.  Patient is left-handed.    Low Back, Neck, Right shoulder pain, fall about 1 year ago and pain slowly gotten worse      Duration: 1 year    Description (location/character/radiation): Low Back, Neck, Right shoulder     Intensity:  moderate    Accompanying signs and symptoms: as above    History (similar episodes/previous evaluation): None    Precipitating or alleviating factors: worse with lifting    Therapies tried and outcome: None           Problem list and histories reviewed & adjusted, as indicated.  Additional history: as documented    Patient Active Problem List   Diagnosis   (none) - all problems resolved or deleted     Past Surgical History:   Procedure Laterality Date     APPENDECTOMY        SECTION N/A 7/3/2017    Procedure:  SECTION;  Primary  Section ;  Surgeon: Luz Maria Kwan MD;  Location: [facility] L+D     HC INSERTION INTRAUTERINE DEVICE       HC REMOVE  INTRAUTERINE DEVICE  2009       Social History   Substance Use Topics     Smoking status: Never Smoker     Smokeless tobacco: Never Used     Alcohol use No     Family History   Problem Relation Age of Onset     Cerebrovascular Disease Maternal Grandfather      HEART DISEASE Maternal Grandfather      Other - See Comments Daughter      Hydrocephaly         Current Outpatient Prescriptions   Medication Sig Dispense Refill     diclofenac (VOLTAREN) 75 MG EC tablet Take 1 tablet (75 mg) by mouth 2 times daily as needed for moderate pain 60 tablet 1     medroxyPROGESTERone (DEPO-PROVERA) 150 MG/ML injection Inject 1 mL (150 mg) into the muscle every 3 months 1 mL 3     methylPREDNISolone (MEDROL DOSEPAK) 4 MG tablet Follow package instructions 21 tablet 0     tiZANidine (ZANAFLEX) 2 MG tablet Take 1 tablet (2 mg) by mouth 2 times daily as needed for muscle spasms 60 tablet 0     No Known Allergies  Recent Labs   Lab Test  07/04/17   2215   ALT  12   CR  0.71   GFRESTIMATED  >90  Non  GFR Calc     GFRESTBLACK  >90   GFR Calc     POTASSIUM  4.2      BP Readings from Last 3 Encounters:   07/24/18 94/62   06/08/18 112/70   04/25/18 111/69    Wt Readings from Last 3 Encounters:   07/24/18 119 lb (54 kg)   06/08/18 120 lb 12.8 oz (54.8 kg)   04/25/18 120 lb 3.2 oz (54.5 kg)                  Labs reviewed in EPIC    Reviewed and updated as needed this visit by clinical staff  Tobacco  Allergies  Meds  Med Hx  Surg Hx  Fam Hx  Soc Hx      Reviewed and updated as needed this visit by Provider         ROS:  CONSTITUTIONAL: NEGATIVE for fever, chills, change in weight  MUSCULOSKELETAL: as above  NEURO: NEGATIVE for weakness, dizziness or paresthesias  PSYCHIATRIC: NEGATIVE for changes in mood or affect    OBJECTIVE:     BP 94/62  Pulse 88  Temp 97.5  F (36.4  C) (Temporal)  Wt 119 lb (54 kg)  SpO2 98%  BMI 23.99 kg/m2  Body mass index is 23.99 kg/(m^2).  GENERAL: healthy, alert and no  distress  MS: Normal gait  Neck-moderate trapezius and paracervical muscle spasm, painful but full range of motion of the neck, no cervical spine tenderness  Moderate paraspinal muscle spasm of the anterior back  Right shoulder-normal on inspection, nontender on palpation, full range of motion, negative impingement test  SKIN: no suspicious lesions or rashes  NEURO: Normal strength and tone, mentation intact and speech normal  BACK: no CVA tenderness, no paralumbar tenderness  Comprehensive back pain exam:  Tenderness of Left paralumbar muscles, Range of motion not limited by pain, Lower extremity strength functional and equal on both sides, Lower extremity reflexes within normal limits bilaterally, Lower extremity sensation normal and equal on both sides and Straight leg raise negative bilaterally  PSYCH: mentation appears normal, affect normal/bright    Diagnostic Test Results:  none     ASSESSMENT/PLAN:             1. Cervicalgia  Chronic along with other muscle pain as mentioned below.    Recommended to start on physical therapy, take muscle relaxer tizanidine twice daily as needed along with diclofenac twice daily as needed for pain  Given the concern for possible cervical radiculopathy, will start on Medrol Dosepak.    Patient will follow up with the provider in 4 weeks of physical therapy if she does not feel any better to determine the need for MRI given the chronicity of symptoms.  Dosing and potential medication side effects discussed.  Patient verbalised understanding and is agreeable to the plan.    - tiZANidine (ZANAFLEX) 2 MG tablet; Take 1 tablet (2 mg) by mouth 2 times daily as needed for muscle spasms  Dispense: 60 tablet; Refill: 0  - diclofenac (VOLTAREN) 75 MG EC tablet; Take 1 tablet (75 mg) by mouth 2 times daily as needed for moderate pain  Dispense: 60 tablet; Refill: 1  - methylPREDNISolone (MEDROL DOSEPAK) 4 MG tablet; Follow package instructions  Dispense: 21 tablet; Refill: 0  - [facility] PT,  HAND, AND CHIROPRACTIC REFERRAL    2. Chronic midline low back pain with left-sided sciatica  as above    - tiZANidine (ZANAFLEX) 2 MG tablet; Take 1 tablet (2 mg) by mouth 2 times daily as needed for muscle spasms  Dispense: 60 tablet; Refill: 0  - diclofenac (VOLTAREN) 75 MG EC tablet; Take 1 tablet (75 mg) by mouth 2 times daily as needed for moderate pain  Dispense: 60 tablet; Refill: 1  - methylPREDNISolone (MEDROL DOSEPAK) 4 MG tablet; Follow package instructions  Dispense: 21 tablet; Refill: 0  - [facility] PT, HAND, AND CHIROPRACTIC REFERRAL    3. Right shoulder pain, unspecified chronicity  Likely referred pain from the cervical radiculopathy    - tiZANidine (ZANAFLEX) 2 MG tablet; Take 1 tablet (2 mg) by mouth 2 times daily as needed for muscle spasms  Dispense: 60 tablet; Refill: 0  - diclofenac (VOLTAREN) 75 MG EC tablet; Take 1 tablet (75 mg) by mouth 2 times daily as needed for moderate pain  Dispense: 60 tablet; Refill: 1  - methylPREDNISolone (MEDROL DOSEPAK) 4 MG tablet; Follow package instructions  Dispense: 21 tablet; Refill: 0  - [facility] PT, HAND, AND CHIROPRACTIC REFERRAL    4. Cervical radiculopathy    - tiZANidine (ZANAFLEX) 2 MG tablet; Take 1 tablet (2 mg) by mouth 2 times daily as needed for muscle spasms  Dispense: 60 tablet; Refill: 0  - diclofenac (VOLTAREN) 75 MG EC tablet; Take 1 tablet (75 mg) by mouth 2 times daily as needed for moderate pain  Dispense: 60 tablet; Refill: 1  - methylPREDNISolone (MEDROL DOSEPAK) 4 MG tablet; Follow package instructions  Dispense: 21 tablet; Refill: 0  - [facility] PT, HAND, AND CHIROPRACTIC REFERRAL    5. Spasm of muscle    - tiZANidine (ZANAFLEX) 2 MG tablet; Take 1 tablet (2 mg) by mouth 2 times daily as needed for muscle spasms  Dispense: 60 tablet; Refill: 0  - diclofenac (VOLTAREN) 75 MG EC tablet; Take 1 tablet (75 mg) by mouth 2 times daily as needed for moderate pain  Dispense: 60 tablet; Refill: 1  - methylPREDNISolone (MEDROL DOSEPAK) 4 MG tablet; Follow  package instructions  Dispense: 21 tablet; Refill: 0  - [facility] PT, HAND, AND CHIROPRACTIC REFERRAL    6. H/O fall  as above    - tiZANidine (ZANAFLEX) 2 MG tablet; Take 1 tablet (2 mg) by mouth 2 times daily as needed for muscle spasms  Dispense: 60 tablet; Refill: 0  - diclofenac (VOLTAREN) 75 MG EC tablet; Take 1 tablet (75 mg) by mouth 2 times daily as needed for moderate pain  Dispense: 60 tablet; Refill: 1  - methylPREDNISolone (MEDROL DOSEPAK) 4 MG tablet; Follow package instructions  Dispense: 21 tablet; Refill: 0  - [facility] PT, HAND, AND CHIROPRACTIC REFERRAL    Chart documentation done in part with Dragon Voice recognition Software. Although reviewed after completion, some word and grammatical error may remain.    See Patient Instructions    Robson Wilson MD  [facility]

## 2018-07-24 NOTE — PATIENT INSTRUCTIONS
start on MEDROL DOSE PACK, TIZANIDINE and DICLOFENAC as prescribed  Start on PT  Follow up if pain is no better in 4 weeks of PT

## 2018-07-31 ENCOUNTER — THERAPY VISIT (OUTPATIENT)
Dept: PHYSICAL THERAPY | Facility: CLINIC | Age: 33
End: 2018-07-31
Attending: FAMILY MEDICINE
Payer: COMMERCIAL

## 2018-07-31 DIAGNOSIS — M54.12 CERVICAL RADICULOPATHY: Primary | ICD-10-CM

## 2018-07-31 DIAGNOSIS — M54.50 LUMBAGO: ICD-10-CM

## 2018-07-31 PROCEDURE — 97161 PT EVAL LOW COMPLEX 20 MIN: CPT | Mod: GP | Performed by: PHYSICAL THERAPIST

## 2018-07-31 PROCEDURE — 97140 MANUAL THERAPY 1/> REGIONS: CPT | Mod: GP | Performed by: PHYSICAL THERAPIST

## 2018-07-31 PROCEDURE — 97112 NEUROMUSCULAR REEDUCATION: CPT | Mod: GP | Performed by: PHYSICAL THERAPIST

## 2018-07-31 PROCEDURE — 97110 THERAPEUTIC EXERCISES: CPT | Mod: GP | Performed by: PHYSICAL THERAPIST

## 2018-07-31 NOTE — PROGRESS NOTES
[facility] Initial Evaluation  Subjective:  Patient is a 33 year old female presenting with rehab cervical spine hpi. The history is provided by the patient. A  was used (Frisian).   Jo Ann Patel is a 33 year old female with a cervical spine (shoulder) condition.  Condition occurred with:  Other reason (fall while pregnant ~1year ago).    This is a new and chronic condition  Many months, intermittent pain.  MD visit 7/24/18  .    Patient reports pain:  Central cervical spine, cervical right side, mid cervical spine and thoracic right side.  Radiates to:  Elbow left, lower arm right, upper arm right, shoulder right and elbow right.  Pain is described as stabbing and burning  and reported as 6/10.  Associated symptoms:  Headache, tingling and loss of strength. Pain is worse during the night.  Symptoms are exacerbated by looking up or down, carrying and certain positions and relieved by rest and other (massage).  Since onset symptoms are unchanged.        General health as reported by patient is good.  Pertinent medical history includes:  None.  Medical allergies: no.  Surgical history: c- section, appendix\  Current medications:  None as reported by the patient (birthday).  Current occupation is Homemaker  .        Barriers include:  None as reported by the patient.    Red flags:  None as reported by the patient.                        Objective:  Standing Alignment:    Cervical/Thoracic:  Forward head                               Lumbar/SI Evaluation  ROM:  Arom wnl lumbar: LROM flex, ext SG, mod loss +        Lumbar Myotomes:  normal              Cord Signs:  normal        Lumbar Palpation:      Tenderness present at Right: Erector Spinae             Cervical/Thoracic Evaluation  Arom wnl cervical: CROM flex min loss,+, Ext min loss, rot mod loss+, SB min - mod loss + right.         Headaches: cervical  Cervical Myotomes:  normal                        Cervical  Palpation:      Tenderness present at Right:    Rhomboids; Upper Trap; Levator; Erector Spinae and Suboccipitals               Shoulder Evaluation:  ROM:  AROM:  :  = Dominic. pain in right triceps with all motions.                                  Strength:  normal                          Palpation:      Right shoulder tenderness present at: Subscapularis; Levator; Rhomboids and Upper Trap                                     General     ROS    Assessment/Plan:    Patient is a 33 year old female with cervical and lumbar complaints.    Patient has the following significant findings with corresponding treatment plan.                Diagnosis 1:  Cervical radiculaopathy  Pain -  hot/cold therapy, US, manual therapy, self management, education and home program  Decreased ROM/flexibility - manual therapy and therapeutic exercise  Impaired muscle performance - neuro re-education  Decreased function - therapeutic activities  Impaired posture - neuro re-education  Diagnosis 2:  Lumbago   Pain -  hot/cold therapy, US, electric stimulation, manual therapy, self management, education and home program  Decreased ROM/flexibility - manual therapy and therapeutic exercise  Impaired muscle performance - neuro re-education  Decreased function - therapeutic activities  Impaired posture - neuro re-education    Therapy Evaluation Codes:   1) History comprised of:   Personal factors that impact the plan of care:      None.    Comorbidity factors that impact the plan of care are:      None.     Medications impacting care: None.  2) Examination of Body Systems comprised of:   Body structures and functions that impact the plan of care:      Elbow, Lumbar spine and Shoulder.   Activity limitations that impact the plan of care are:      Bending, Cooking, Lifting, Walking and .  3) Clinical presentation characteristics are:   Stable/Uncomplicated.  4) Decision-Making    Low complexity using standardized patient assessment instrument  and/or measureable assessment of functional outcome.  Cumulative Therapy Evaluation is: Low complexity.    Previous and current functional limitations:  (See Goal Flow Sheet for this information)    Short term and Long term goals: (See Goal Flow Sheet for this information)     Communication ability:  Patient has an  for communication clarity.  Treatment Explanation - The following has been discussed with the patient:   RX ordered/plan of care  Anticipated outcomes  Possible risks and side effects  This patient would benefit from PT intervention to resume normal activities.   Rehab potential is good.    Frequency:  1 X week, once daily  Duration:  for 8 weeks  Discharge Plan:  Achieve all LTG.  Independent in home treatment program.  Reach maximal therapeutic benefit.    Please refer to the daily flowsheet for treatment today, total treatment time and time spent performing 1:1 timed codes.

## 2018-08-13 ENCOUNTER — THERAPY VISIT (OUTPATIENT)
Dept: PHYSICAL THERAPY | Facility: CLINIC | Age: 33
End: 2018-08-13
Payer: COMMERCIAL

## 2018-08-13 DIAGNOSIS — M54.50 LUMBAGO: ICD-10-CM

## 2018-08-13 DIAGNOSIS — M54.12 CERVICAL RADICULOPATHY: ICD-10-CM

## 2018-08-13 PROCEDURE — 97110 THERAPEUTIC EXERCISES: CPT | Mod: GP | Performed by: PHYSICAL THERAPIST

## 2018-08-13 PROCEDURE — 97140 MANUAL THERAPY 1/> REGIONS: CPT | Mod: GP | Performed by: PHYSICAL THERAPIST

## 2018-08-16 ENCOUNTER — THERAPY VISIT (OUTPATIENT)
Dept: PHYSICAL THERAPY | Facility: CLINIC | Age: 33
End: 2018-08-16
Payer: COMMERCIAL

## 2018-08-16 DIAGNOSIS — M54.12 CERVICAL RADICULOPATHY: ICD-10-CM

## 2018-08-16 DIAGNOSIS — M54.50 LUMBAGO: ICD-10-CM

## 2018-08-16 PROCEDURE — 97110 THERAPEUTIC EXERCISES: CPT | Mod: GP | Performed by: PHYSICAL THERAPIST

## 2018-08-16 PROCEDURE — 97140 MANUAL THERAPY 1/> REGIONS: CPT | Mod: GP | Performed by: PHYSICAL THERAPIST

## 2018-08-23 ENCOUNTER — THERAPY VISIT (OUTPATIENT)
Dept: PHYSICAL THERAPY | Facility: CLINIC | Age: 33
End: 2018-08-23
Payer: COMMERCIAL

## 2018-08-23 DIAGNOSIS — M54.50 LUMBAGO: ICD-10-CM

## 2018-08-23 DIAGNOSIS — M54.12 CERVICAL RADICULOPATHY: ICD-10-CM

## 2018-08-23 PROCEDURE — 97140 MANUAL THERAPY 1/> REGIONS: CPT | Mod: GP | Performed by: PHYSICAL THERAPIST

## 2018-08-23 PROCEDURE — 97110 THERAPEUTIC EXERCISES: CPT | Mod: GP | Performed by: PHYSICAL THERAPIST

## 2018-08-27 ENCOUNTER — OFFICE VISIT (OUTPATIENT)
Dept: OBGYN | Facility: CLINIC | Age: 33
End: 2018-08-27
Payer: COMMERCIAL

## 2018-08-27 VITALS
BODY MASS INDEX: 23.81 KG/M2 | HEIGHT: 59 IN | SYSTOLIC BLOOD PRESSURE: 102 MMHG | OXYGEN SATURATION: 97 % | WEIGHT: 118.1 LBS | DIASTOLIC BLOOD PRESSURE: 66 MMHG

## 2018-08-27 DIAGNOSIS — Z13.29 SCREENING FOR THYROID DISORDER: ICD-10-CM

## 2018-08-27 DIAGNOSIS — Z13.220 LIPID SCREENING: ICD-10-CM

## 2018-08-27 DIAGNOSIS — Z13.1 SCREENING FOR DIABETES MELLITUS: ICD-10-CM

## 2018-08-27 DIAGNOSIS — Z30.8 ENCOUNTER FOR OTHER CONTRACEPTIVE MANAGEMENT: ICD-10-CM

## 2018-08-27 DIAGNOSIS — R07.0 THROAT PAIN: Primary | ICD-10-CM

## 2018-08-27 LAB
CHOLEST SERPL-MCNC: 119 MG/DL
DEPRECATED S PYO AG THROAT QL EIA: NORMAL
HDLC SERPL-MCNC: 47 MG/DL
LDLC SERPL CALC-MCNC: 65 MG/DL
NONHDLC SERPL-MCNC: 72 MG/DL
SPECIMEN SOURCE: NORMAL
TRIGL SERPL-MCNC: 34 MG/DL
TSH SERPL DL<=0.005 MIU/L-ACNC: 1.89 MU/L (ref 0.4–4)

## 2018-08-27 PROCEDURE — 87880 STREP A ASSAY W/OPTIC: CPT | Performed by: OBSTETRICS & GYNECOLOGY

## 2018-08-27 PROCEDURE — 80061 LIPID PANEL: CPT | Performed by: OBSTETRICS & GYNECOLOGY

## 2018-08-27 PROCEDURE — 99395 PREV VISIT EST AGE 18-39: CPT | Mod: 25 | Performed by: OBSTETRICS & GYNECOLOGY

## 2018-08-27 PROCEDURE — 87081 CULTURE SCREEN ONLY: CPT | Performed by: OBSTETRICS & GYNECOLOGY

## 2018-08-27 PROCEDURE — 96372 THER/PROPH/DIAG INJ SC/IM: CPT | Performed by: OBSTETRICS & GYNECOLOGY

## 2018-08-27 PROCEDURE — 84443 ASSAY THYROID STIM HORMONE: CPT | Performed by: OBSTETRICS & GYNECOLOGY

## 2018-08-27 PROCEDURE — 36415 COLL VENOUS BLD VENIPUNCTURE: CPT | Performed by: OBSTETRICS & GYNECOLOGY

## 2018-08-27 RX ORDER — MEDROXYPROGESTERONE ACETATE 150 MG/ML
150 INJECTION, SUSPENSION INTRAMUSCULAR
Qty: 3 ML | Refills: 3 | OUTPATIENT
Start: 2018-08-27 | End: 2019-09-23

## 2018-08-27 ASSESSMENT — PATIENT HEALTH QUESTIONNAIRE - PHQ9: 5. POOR APPETITE OR OVEREATING: SEVERAL DAYS

## 2018-08-27 ASSESSMENT — ANXIETY QUESTIONNAIRES
5. BEING SO RESTLESS THAT IT IS HARD TO SIT STILL: NOT AT ALL
2. NOT BEING ABLE TO STOP OR CONTROL WORRYING: NOT AT ALL
IF YOU CHECKED OFF ANY PROBLEMS ON THIS QUESTIONNAIRE, HOW DIFFICULT HAVE THESE PROBLEMS MADE IT FOR YOU TO DO YOUR WORK, TAKE CARE OF THINGS AT HOME, OR GET ALONG WITH OTHER PEOPLE: NOT DIFFICULT AT ALL
GAD7 TOTAL SCORE: 2
6. BECOMING EASILY ANNOYED OR IRRITABLE: NOT AT ALL
1. FEELING NERVOUS, ANXIOUS, OR ON EDGE: NOT AT ALL
3. WORRYING TOO MUCH ABOUT DIFFERENT THINGS: SEVERAL DAYS
7. FEELING AFRAID AS IF SOMETHING AWFUL MIGHT HAPPEN: NOT AT ALL

## 2018-08-27 NOTE — PATIENT INSTRUCTIONS
If you have any questions regarding your visit, Please contact your care team.    Women s Health CLINIC HOURS TELEPHONE NUMBER   Michelle Lee DO.    SHAN Moser -    FRANCOISE Lopez       Monday, Wednesday, Thursday and Friday, [place]  8:30a.m-5:00 p.m   [facility]  [address] [place] [address]  [phone] ask for [facility]    Imaging Ctnpynlrrd-[phone]       Urgent Care locations:    [facility] Saturday and Sunday   9 am - 5 pm    Monday-Friday   12 pm - 8 pm  Saturday and Sunday   9 am - 5 pm   [phone]     [facility] Labor and Delivery:  [phone]    If you need a medication refill, please contact your pharmacy. Please allow 3 business days for your refill to be completed.  As always, Thank you for trusting us with your healthcare needs!

## 2018-08-27 NOTE — NURSING NOTE
URINE HCG:not indicated    The following medication was given:     MEDICATION: Depo Provera 150mg  ROUTE: IM  SITE: RUQ - Gluteus  : [employer]  LOT #: [id number]  EXP:02/20  NEXT INJECTION DUE: 11/12/18 - 11/26/18   Provider: Dr. Jesus Veloz, [facility]  August 27, 2018 10:10 AM

## 2018-08-27 NOTE — MR AVS SNAPSHOT
After Visit Summary   8/27/2018    Jo Ann Patel    MRN: [id number]           Patient Information     Date Of Birth          1985        Visit Information        Provider Department      8/27/2018 8:15 AM Michelle Lee DO; [place] [employer] [facility]        Care Instructions                                                         If you have any questions regarding your visit, Please contact your care team.    [facility] CLINIC HOURS TELEPHONE NUMBER   Michelle Lee DO.    SHAN Moser -    FRANCOISE Lopez       Monday, Wednesday, [facility]  8:30a.m-5:00 p.m   [facility]  [address]  [place] [address]  [phone] ask for [facility]    Imaging [phone]       Urgent Care locations:    [facility] Saturday and Sunday   9 am - 5 pm    Monday-Friday   12 pm - 8 pm  Saturday and Sunday   9 am - 5 pm   [phone]     [facility] Labor and Delivery:  [phone]    If you need a medication refill, please contact your pharmacy. Please allow 3 business days for your refill to be completed.  As always, Thank you for trusting us with your healthcare needs!                Follow-ups after your visit        Your next 10 appointments already scheduled     Aug 28, 2018  1:55 PM CDT   TAYA Spine with Chante Petit PTA   [facility] ([facility]  )    [address]  [facility] [id number] [phone]            Aug 30, 2018  8:05 AM CDT   TAYA Spine with Rebecca Gil PT   [facility] ([facility]  )    [address]  [facility] [id number] [phone]              Who to contact     If you have questions or need follow up information about today's clinic visit or your schedule please contact [facility]  "[facility] directly at [phone].  Normal or non-critical lab and imaging results will be communicated to you by MyChart, letter or phone within 4 business days after the clinic has received the results. If you do not hear from us within 7 days, please contact the clinic through MyChart or phone. If you have a critical or abnormal lab result, we will notify you by phone as soon as possible.  Submit refill requests through [employer] or call your pharmacy and they will forward the refill request to us. Please allow 3 business days for your refill to be completed.          Additional Information About Your Visit        Care EveryWhere ID     This is your Care EveryWhere ID. This could be used by other organizations to access your [place] medical records  [id number]        Your Vitals Were     Height Last Period Pulse Oximetry Breastfeeding? BMI (Body Mass Index)       4' 11.25\" (1.505 m) 08/26/2018 97% No 23.65 kg/m2        Blood Pressure from Last 3 Encounters:   08/27/18 102/66   07/24/18 94/62   06/08/18 112/70    Weight from Last 3 Encounters:   08/27/18 118 lb 1.6 oz (53.6 kg)   07/24/18 119 lb (54 kg)   06/08/18 120 lb 12.8 oz (54.8 kg)              Today, you had the following     No orders found for display         Today's Medication Changes          These changes are accurate as of 8/27/18  8:42 AM.  If you have any questions, ask your nurse or doctor.               Stop taking these medicines if you haven't already. Please contact your care team if you have questions.     diclofenac 75 MG EC tablet   Commonly known as:  VOLTAREN   Stopped by:  Michelle Lee DO           methylPREDNISolone 4 MG tablet   Commonly known as:  MEDROL DOSEPAK   Stopped by:  Michelle Lee DO           tiZANidine 2 MG tablet   Commonly known as:  ZANAFLEX   Stopped by:  Michelle Lee DO                    Primary Care Provider Office Phone # Fax #    [facility] [place] [facility] [phone] " [phone]       [address]  [facility] [address]        Equal Access to Services     IRAM GORDON : Hadii aad ku hadgradyo Somindy, wawilliamda luqadaha, qaarianeta kaalmada moiraraegan, asim missyin hayaagabby pizarroyohannes villafana cornel brock. So [facility] [phone].    ATENCIÓN: Si habla español, tiene a schafer disposición servicios gratuitos de asistencia lingüística. Llame al [phone].    We comply with applicable federal civil rights laws and [place] laws. We do not discriminate on the basis of race, color, national origin, age, disability, sex, sexual orientation, or gender identity.            Thank you!     Thank you for choosing [facility]  for your care. Our goal is always to provide you with excellent care. Hearing back from our patients is one way we can continue to improve our services. Please take a few minutes to complete the written survey that you may receive in the mail after your visit with us. Thank you!             Your Updated Medication List - Protect others around you: Learn how to safely use, store and throw away your medicines at www.disposemymeds.org.          This list is accurate as of 8/27/18  8:42 AM.  Always use your most recent med list.                   Brand Name Dispense Instructions for use Diagnosis    medroxyPROGESTERone 150 MG/ML injection    DEPO-PROVERA    1 mL    Inject 1 mL (150 mg) into the muscle every 3 months    Encounter for initial prescription of injectable contraceptive

## 2018-08-27 NOTE — PROGRESS NOTES
"Jo Ann is a 33 year old female, , who is here for her annual exam.  She is in a fasting state and due for labwork today so will have her go to the lab after her clinic visit.  She would also like her next Depo Provera injection for contraception since she does not want to conceive this year.  She is hoping to have more children in the future, however.  She does c/o a sore throat so will check for strept and treat if +.    ROS: Ten point review of systems was reviewed and negative except the above.    Health Maintenance   Topic Date Due     PHQ-9 Q1YR  2003     JOVANY QUESTIONNAIRE 1 YEAR  2018     INFLUENZA VACCINE (1) 2018     PAP Q3 YR  2020     HPV Q3 Years  2020     TETANUS IMMUNIZATION (SYSTEM ASSIGNED)  2027     HIV SCREEN (SYSTEM ASSIGNED)  Completed      Last pap: 2017 so not due until 2020  Last Mammogram: not due  Last Dexa: not due  Last Colonoscopy: not due  No results found for: CHOL  No results found for: HDL  No results found for: LDL  No results found for: TRIG  No results found for: CHOLHDLRATIO      OBHX:      PSH:   Past Surgical History:   Procedure Laterality Date     APPENDECTOMY        SECTION N/A 7/3/2017    Procedure:  SECTION;  Primary  Section ;  Surgeon: Luz Maria Kwan MD;  Location: [facility] L+D     HC INSERTION INTRAUTERINE DEVICE       HC REMOVE INTRAUTERINE DEVICE           PMH: Her past medical, surgical, and obstetric histories were reviewed and are documented in their appropriate chart areas.    ALL/Meds: Her medication and allergy histories were reviewed and are documented in their appropriate chart areas.    SH/FMH: Her social and family history was reviewed and documented in its appropriate chart area.    PE: /66  Ht 4' 11.25\" (1.505 m)  Wt 118 lb 1.6 oz (53.6 kg)  LMP 2018  SpO2 97%  Breastfeeding? No  BMI 23.65 kg/m2  Body mass index is 23.65 kg/(m^2).    General " Appearance:  healthy, alert, active, no distress  Cardiovascular:  Regular rate and Rhythm without murmur  Neck: Supple, no adenopathy and thyroid normal  Lungs:  Clear, without wheeze, rale or rhonchi  Breast: normal breast exam  Abdomen: Benign, Soft, flat, non-tender, No masses, organomegaly, No inguinal nodes and Bowel sounds normoactive.   Pelvic:       - Ext: Vulva and perineum are normal without lesion, mass or discharge        - Urethra: normal without discharge       - Urethral Meatus: normal appearance       - Bladder: no tenderness, no masses       - Vagina: Normal mucosa, no discharge        - Cervix: normal and nulliparous       - Uterus:Normal shape, position and consistency        - Adnexa: Normal without masses or tenderness       - Rectal: deferred    A/P:  Well Woman Exam, Renewal of Depo Provera for Contraception, Pre-conceptual Care     -  I discussed the new pap recommendations regarding screening.  Explained the rationale for increased intervals between paps.  Questions asked and answered.  She does agree to this regiment.  Pap was not obtained since not due until 8/2020   -  BC: depoprovera with next injection provided today since due   -  Check throat cx for strep and treat if +   -  Check labs in a fasting state today  Orders Placed This Encounter   Procedures     TSH with free T4 reflex     Glucose     Lipid Profile (Chol, Trig, HDL, LDL calc)      -  Encouraged self-breast exam   -  Encouraged low fat diet, regular exercise, and adequate calcium intake.   -  She is planning to conceive in 1-2 years so pre-conceptual care was discussed.  Michelle Lee, DO  FACOG, FACS

## 2018-08-28 ENCOUNTER — THERAPY VISIT (OUTPATIENT)
Dept: PHYSICAL THERAPY | Facility: CLINIC | Age: 33
End: 2018-08-28
Payer: COMMERCIAL

## 2018-08-28 ENCOUNTER — TELEPHONE (OUTPATIENT)
Dept: OBGYN | Facility: CLINIC | Age: 33
End: 2018-08-28

## 2018-08-28 DIAGNOSIS — M54.12 CERVICAL RADICULOPATHY: ICD-10-CM

## 2018-08-28 DIAGNOSIS — M54.50 LUMBAGO: ICD-10-CM

## 2018-08-28 LAB
BACTERIA SPEC CULT: NORMAL
SPECIMEN SOURCE: NORMAL

## 2018-08-28 PROCEDURE — 97140 MANUAL THERAPY 1/> REGIONS: CPT | Mod: GP | Performed by: PHYSICAL THERAPY ASSISTANT

## 2018-08-28 PROCEDURE — 97110 THERAPEUTIC EXERCISES: CPT | Mod: GP | Performed by: PHYSICAL THERAPY ASSISTANT

## 2018-08-28 ASSESSMENT — PATIENT HEALTH QUESTIONNAIRE - PHQ9: SUM OF ALL RESPONSES TO PHQ QUESTIONS 1-9: 4

## 2018-08-28 ASSESSMENT — ANXIETY QUESTIONNAIRES: GAD7 TOTAL SCORE: 2

## 2018-08-28 NOTE — TELEPHONE ENCOUNTER
Patient called and I told her all labs were good. HDL slightly low.  I will send her a copy of results also.  She stated understanding  KENDELL Yanes 8/28/2018

## 2018-08-28 NOTE — LETTER
[facility]  [address]  [facility] [address]  Phone: [phone]    08/28/18    Jo Ann Patel  [address]   [place] [address]        Dear Jo Ann,  Here is a copy of lab results.    Component Results   Component Value Flag Ref Range Units Status Collected Lab   Cholesterol 119  <200 mg/dL Final 08/27/2018  9:29 AM MG   Triglycerides 34  <150 mg/dL Final 08/27/2018  9:29 AM 59   Comment:   Fasting specimen   HDL Cholesterol 47 (L) >49 mg/dL Final 08/27/2018  9:29 AM MG   LDL Cholesterol Calculated 65  <100 mg/dL Final 08/27/2018  9:29 AM 59   Comment:   Desirable:       <100 mg/dl   Non HDL Cholesterol 72           Component Results   Component Value Flag Ref Range Units Status Collected Lab   TSH 1.89  0.40 - 4.00 mU/L Final 08/27/2018  9:29 AM MG     Throat culture negative.      Michelle Lee DO

## 2018-08-28 NOTE — TELEPHONE ENCOUNTER
Attempted to reach pt through a  to relay the below result note.  Left message to call clinic back.    Notes Recorded by Michelle Lee DO on 8/27/2018 at 2:13 PM  Please tell her that her throat culture was negative and her TSH and lipids were normal except she needs to exercise more since her HDL was too low.    Salud Recinos RN

## 2018-08-30 ENCOUNTER — THERAPY VISIT (OUTPATIENT)
Dept: PHYSICAL THERAPY | Facility: CLINIC | Age: 33
End: 2018-08-30
Payer: COMMERCIAL

## 2018-08-30 DIAGNOSIS — M54.12 CERVICAL RADICULOPATHY: ICD-10-CM

## 2018-08-30 DIAGNOSIS — M54.50 LUMBAGO: ICD-10-CM

## 2018-08-30 PROCEDURE — 97110 THERAPEUTIC EXERCISES: CPT | Mod: GP | Performed by: PHYSICAL THERAPIST

## 2018-08-30 PROCEDURE — 97140 MANUAL THERAPY 1/> REGIONS: CPT | Mod: GP | Performed by: PHYSICAL THERAPIST

## 2018-09-11 ENCOUNTER — THERAPY VISIT (OUTPATIENT)
Dept: PHYSICAL THERAPY | Facility: CLINIC | Age: 33
End: 2018-09-11
Payer: COMMERCIAL

## 2018-09-11 DIAGNOSIS — M54.12 CERVICAL RADICULOPATHY: ICD-10-CM

## 2018-09-11 DIAGNOSIS — M54.50 LUMBAGO: ICD-10-CM

## 2018-09-11 PROCEDURE — 97110 THERAPEUTIC EXERCISES: CPT | Mod: GP | Performed by: PHYSICAL THERAPY ASSISTANT

## 2018-09-11 PROCEDURE — 97140 MANUAL THERAPY 1/> REGIONS: CPT | Mod: GP | Performed by: PHYSICAL THERAPY ASSISTANT

## 2018-09-17 ENCOUNTER — THERAPY VISIT (OUTPATIENT)
Dept: PHYSICAL THERAPY | Facility: CLINIC | Age: 33
End: 2018-09-17
Payer: COMMERCIAL

## 2018-09-17 DIAGNOSIS — M54.12 CERVICAL RADICULOPATHY: ICD-10-CM

## 2018-09-17 DIAGNOSIS — M54.50 LUMBAGO: ICD-10-CM

## 2018-09-17 PROCEDURE — 97140 MANUAL THERAPY 1/> REGIONS: CPT | Mod: GP | Performed by: PHYSICAL THERAPIST

## 2018-09-17 PROCEDURE — 97110 THERAPEUTIC EXERCISES: CPT | Mod: GP | Performed by: PHYSICAL THERAPIST

## 2018-09-17 NOTE — PROGRESS NOTES
"Subjective:  HPI                    Objective:  System    Physical Exam    General     ROS    Assessment/Plan:    PROGRESS  REPORT    Progress reporting period is from 7/31/2018 to 9/17/2018.   Patient has been seen for 8 visits.    SUBJECTIVE  Subjective: Patient reports overall improvement with PT and would like to continue.  Reports LB has been good.  Experiences intermittent pain that is managed with exercises.  Neck pain is also improved but continues to be bothersome.  Reports R neck pain that can radiate into the R arm and sometimes \"concentrates\" in the R scapula.  Currently reports 4/10 pain in the R neck to R shoulder and R shoulder blade.  Has been doing exercises at home and they are helpful.    Current Pain level: 4/10.     Initial Pain level: 6/10.   Changes in function:  Yes (See Goal flowsheet attached for changes in current functional level)  Adverse reaction to treatment or activity: None    OBJECTIVE    Objective: CROM: Patient demonstrates full flexion, extension, B SB, and L rotation ROM.  Demonstrates a minimal loss of R rotation with ERP.  ROM and pain improved following session.  Shoulder ROM: Demonstrates limited ER in neutral on R.     ASSESSMENT/PLAN  Updated problem list and treatment plan: Diagnosis 1:  Neck and LBP  Pain -  manual therapy, directional preference exercise and home program  Decreased ROM/flexibility - manual therapy, therapeutic exercise and home program  Decreased function - therapeutic activities and home program  STG/LTGs have been met or progress has been made towards goals:  Yes (See Goal flow sheet completed today.)  Assessment of Progress: The patient's condition is improving.  Self Management Plans:  Patient has been instructed in a home treatment program.  I have re-evaluated this patient and find that the nature, scope, duration and intensity of the therapy is appropriate for the medical condition of the patient.  Jo Ann continues to require the following " intervention to meet STG and LTG's:  PT    Recommendations:  This patient would benefit from continued therapy.     Frequency:  1 X week, once daily  Duration:  for 4 weeks        Please refer to the daily flowsheet for treatment today, total treatment time and time spent performing 1:1 timed codes.

## 2018-10-01 ENCOUNTER — THERAPY VISIT (OUTPATIENT)
Dept: PHYSICAL THERAPY | Facility: CLINIC | Age: 33
End: 2018-10-01
Payer: COMMERCIAL

## 2018-10-01 DIAGNOSIS — M54.50 LUMBAGO: ICD-10-CM

## 2018-10-01 DIAGNOSIS — M54.12 CERVICAL RADICULOPATHY: ICD-10-CM

## 2018-10-01 PROCEDURE — 97140 MANUAL THERAPY 1/> REGIONS: CPT | Mod: GP | Performed by: PHYSICAL THERAPIST

## 2018-10-01 PROCEDURE — 97110 THERAPEUTIC EXERCISES: CPT | Mod: GP | Performed by: PHYSICAL THERAPIST

## 2018-10-08 ENCOUNTER — THERAPY VISIT (OUTPATIENT)
Dept: PHYSICAL THERAPY | Facility: CLINIC | Age: 33
End: 2018-10-08
Payer: COMMERCIAL

## 2018-10-08 DIAGNOSIS — M54.50 LUMBAGO: ICD-10-CM

## 2018-10-08 DIAGNOSIS — M54.12 CERVICAL RADICULOPATHY: ICD-10-CM

## 2018-10-08 PROCEDURE — 97110 THERAPEUTIC EXERCISES: CPT | Mod: GP | Performed by: PHYSICAL THERAPIST

## 2018-10-08 PROCEDURE — 97140 MANUAL THERAPY 1/> REGIONS: CPT | Mod: GP | Performed by: PHYSICAL THERAPIST

## 2018-10-08 NOTE — MR AVS SNAPSHOT
"              After Visit Summary   10/8/2018    Jo Ann Patel    MRN: [id number]           Patient Information     Date Of Birth          1985        Visit Information        Provider Department      10/8/2018 8:45 AM Rebecca Gil PT; [place] [employer] [facility]        Today's Diagnoses     Cervical radiculopathy        Lumbago           Follow-ups after your visit        Your next 10 appointments already scheduled     Oct 15, 2018  8:45 AM CDT   TAYA Spine with Rebecca Gil PT   [facility] ([facility]  )    [address]  [facility] [phone] [phone]            Nov 12, 2018  9:00 AM CST   Nurse Only with NURSE ONLY WOMENS   [facility] ([facility])    [address]  [facility] [id number] [phone]              Who to contact     If you have questions or need follow up information about today's clinic visit or your schedule please contact [facility] PHYSICAL THERAP[facility] directly at [phone].  Normal or non-critical lab and imaging results will be communicated to you by MyChart, letter or phone within 4 business days after the clinic has received the results. If you do not hear from us within 7 days, please contact the clinic through MyChart or phone. If you have a critical or abnormal lab result, we will notify you by phone as soon as possible.  Submit refill requests through [employer] or call your pharmacy and they will forward the refill request to us. Please allow 3 business days for your refill to be completed.          Additional Information About Your Visit        MyChart Information     [employer] lets you send messages to your doctor, view your test results, renew your prescriptions, schedule appointments and more. To sign up, go to [email or web address] . Click on \"Log in\" on the left side of the " "screen, which will take you to the Welcome page. Then click on \"Sign up Now\" on the right side of the page.     You will be asked to enter the access code listed below, as well as some personal information. Please follow the directions to create your username and password.     Your access code is: [email or web address]  Expires: 12/10/2018  3:10 PM     Your access code will  in 90 days. If you need help or a new code, please call your [place] clinic or [phone].        Care EveryWhere ID     This is your Care EveryWhere ID. This could be used by other organizations to access your [place] medical records  [id number]         Blood Pressure from Last 3 Encounters:   18 102/66   18 94/62   18 112/70    Weight from Last 3 Encounters:   18 53.6 kg (118 lb 1.6 oz)   18 54 kg (119 lb)   18 54.8 kg (120 lb 12.8 oz)              We Performed the Following     MANUAL THER TECH,1+REGIONS,EA 15 MIN     THERAPEUTIC EXERCISES        Primary Care Provider Office Phone # Fax #    [facility] [phone]       [address]  [facility] [address]        Equal Access to Services     IRAM GORDON : Hadii aad ku hadasho Soomaali, waaxda luqadaha, qaybta kaalmada adeegyada, waxay missyin hayaan nneka unger . So [facility] [phone].    ATENCIÓN: Si habla español, tiene a schafer disposición servicios gratuitos de asistencia lingüística. Llame al [phone].    We comply with applicable federal civil rights laws and [place] laws. We do not discriminate on the basis of race, color, national origin, age, disability, sex, sexual orientation, or gender identity.            Thank you!     Thank you for choosing [facility] PHYSICAL THERAP[facility]  for your care. Our goal is always to provide you with excellent care. Hearing back from our patients is one way we can continue to improve our services. Please take a few minutes to complete the written " survey that you may receive in the mail after your visit with us. Thank you!             Your Updated Medication List - Protect others around you: Learn how to safely use, store and throw away your medicines at www.disposemymeds.org.          This list is accurate as of 10/8/18  9:40 AM.  Always use your most recent med list.                   Brand Name Dispense Instructions for use Diagnosis    * medroxyPROGESTERone 150 MG/ML injection    DEPO-PROVERA    1 mL    Inject 1 mL (150 mg) into the muscle every 3 months    Encounter for initial prescription of injectable contraceptive       * medroxyPROGESTERone 150 MG/ML injection    DEPO-PROVERA    3 mL    Inject 1 mL (150 mg) into the muscle every 3 months    Encounter for other contraceptive management       * Notice:  This list has 2 medication(s) that are the same as other medications prescribed for you. Read the directions carefully, and ask your doctor or other care provider to review them with you.

## 2018-10-31 ENCOUNTER — OFFICE VISIT (OUTPATIENT)
Dept: PEDIATRICS | Facility: CLINIC | Age: 33
End: 2018-10-31
Payer: COMMERCIAL

## 2018-10-31 VITALS
HEART RATE: 82 BPM | BODY MASS INDEX: 24.13 KG/M2 | TEMPERATURE: 98.4 F | SYSTOLIC BLOOD PRESSURE: 106 MMHG | OXYGEN SATURATION: 96 % | DIASTOLIC BLOOD PRESSURE: 71 MMHG | WEIGHT: 120.5 LBS

## 2018-10-31 DIAGNOSIS — M79.604 BILATERAL LEG PAIN: ICD-10-CM

## 2018-10-31 DIAGNOSIS — Z13.29 SCREENING FOR THYROID DISORDER: ICD-10-CM

## 2018-10-31 DIAGNOSIS — G25.81 RESTLESS LEG: ICD-10-CM

## 2018-10-31 DIAGNOSIS — R10.13 DYSPEPSIA: ICD-10-CM

## 2018-10-31 DIAGNOSIS — M79.605 BILATERAL LEG PAIN: ICD-10-CM

## 2018-10-31 DIAGNOSIS — E78.6 LOW HDL (UNDER 40): ICD-10-CM

## 2018-10-31 DIAGNOSIS — L30.1 DYSHIDROTIC ECZEMA: Primary | ICD-10-CM

## 2018-10-31 DIAGNOSIS — R14.0 BLOATING: ICD-10-CM

## 2018-10-31 LAB
ALBUMIN SERPL-MCNC: 3.8 G/DL (ref 3.4–5)
ALP SERPL-CCNC: 70 U/L (ref 40–150)
ALT SERPL W P-5'-P-CCNC: 84 U/L (ref 0–50)
ANION GAP SERPL CALCULATED.3IONS-SCNC: 2 MMOL/L (ref 3–14)
AST SERPL W P-5'-P-CCNC: 32 U/L (ref 0–45)
BASOPHILS # BLD AUTO: 0.1 10E9/L (ref 0–0.2)
BASOPHILS NFR BLD AUTO: 0.5 %
BILIRUB SERPL-MCNC: 0.6 MG/DL (ref 0.2–1.3)
BUN SERPL-MCNC: 14 MG/DL (ref 7–30)
CALCIUM SERPL-MCNC: 8.7 MG/DL (ref 8.5–10.1)
CHLORIDE SERPL-SCNC: 107 MMOL/L (ref 94–109)
CO2 SERPL-SCNC: 31 MMOL/L (ref 20–32)
CREAT SERPL-MCNC: 0.77 MG/DL (ref 0.52–1.04)
DIFFERENTIAL METHOD BLD: NORMAL
EOSINOPHIL # BLD AUTO: 0.3 10E9/L (ref 0–0.7)
EOSINOPHIL NFR BLD AUTO: 3.4 %
ERYTHROCYTE [DISTWIDTH] IN BLOOD BY AUTOMATED COUNT: 11.4 % (ref 10–15)
FERRITIN SERPL-MCNC: 27 NG/ML (ref 12–150)
GFR SERPL CREATININE-BSD FRML MDRD: 87 ML/MIN/1.7M2
GLUCOSE SERPL-MCNC: 82 MG/DL (ref 70–99)
HCT VFR BLD AUTO: 38.5 % (ref 35–47)
HGB BLD-MCNC: 13.2 G/DL (ref 11.7–15.7)
IMM GRANULOCYTES # BLD: 0 10E9/L (ref 0–0.4)
IMM GRANULOCYTES NFR BLD: 0.4 %
LYMPHOCYTES # BLD AUTO: 2.9 10E9/L (ref 0.8–5.3)
LYMPHOCYTES NFR BLD AUTO: 30.3 %
MCH RBC QN AUTO: 31.5 PG (ref 26.5–33)
MCHC RBC AUTO-ENTMCNC: 34.3 G/DL (ref 31.5–36.5)
MCV RBC AUTO: 92 FL (ref 78–100)
MONOCYTES # BLD AUTO: 0.7 10E9/L (ref 0–1.3)
MONOCYTES NFR BLD AUTO: 6.9 %
NEUTROPHILS # BLD AUTO: 5.6 10E9/L (ref 1.6–8.3)
NEUTROPHILS NFR BLD AUTO: 58.5 %
PLATELET # BLD AUTO: 336 10E9/L (ref 150–450)
POTASSIUM SERPL-SCNC: 3.6 MMOL/L (ref 3.4–5.3)
PROT SERPL-MCNC: 7.7 G/DL (ref 6.8–8.8)
RBC # BLD AUTO: 4.19 10E12/L (ref 3.8–5.2)
SODIUM SERPL-SCNC: 140 MMOL/L (ref 133–144)
WBC # BLD AUTO: 9.6 10E9/L (ref 4–11)

## 2018-10-31 PROCEDURE — 36415 COLL VENOUS BLD VENIPUNCTURE: CPT | Performed by: FAMILY MEDICINE

## 2018-10-31 PROCEDURE — 99000 SPECIMEN HANDLING OFFICE-LAB: CPT | Performed by: FAMILY MEDICINE

## 2018-10-31 PROCEDURE — 82306 VITAMIN D 25 HYDROXY: CPT | Performed by: FAMILY MEDICINE

## 2018-10-31 PROCEDURE — 85025 COMPLETE CBC W/AUTO DIFF WBC: CPT | Performed by: FAMILY MEDICINE

## 2018-10-31 PROCEDURE — 82728 ASSAY OF FERRITIN: CPT | Performed by: FAMILY MEDICINE

## 2018-10-31 PROCEDURE — 83921 ORGANIC ACID SINGLE QUANT: CPT | Mod: 90 | Performed by: FAMILY MEDICINE

## 2018-10-31 PROCEDURE — 82607 VITAMIN B-12: CPT | Performed by: FAMILY MEDICINE

## 2018-10-31 PROCEDURE — 80053 COMPREHEN METABOLIC PANEL: CPT | Performed by: FAMILY MEDICINE

## 2018-10-31 PROCEDURE — 99214 OFFICE O/P EST MOD 30 MIN: CPT | Performed by: FAMILY MEDICINE

## 2018-10-31 RX ORDER — BETAMETHASONE DIPROPIONATE 0.5 MG/G
CREAM TOPICAL
Qty: 50 G | Refills: 0 | Status: SHIPPED | OUTPATIENT
Start: 2018-10-31 | End: 2020-01-13

## 2018-10-31 ASSESSMENT — PAIN SCALES - GENERAL: PAINLEVEL: MILD PAIN (3)

## 2018-10-31 NOTE — PROGRESS NOTES
SUBJECTIVE:   Jo Ann Patel is a 33 year old female who presents to clinic today for the following health issues:    Skin concerns   Patient is here with  with concerns of having itchy, dry erythematous rash in the fingers and dorsum of right hand for the past 3 weeks      Duration: 3 weeks    Description - small bumps  Location: bilateral hands  Itching: moderate    Intensity:  moderate    Accompanying signs and symptoms: None    History (similar episodes/previous evaluation): None    Precipitating or alleviating factors:  New exposures:  None  Recent travel: no      Therapies tried and outcome: hydrocortisone cream -  not effective, antifungal cream - some relief of itching,  lemon drops - relieved itching    Restless Legs/pains    Complaining of intermittent concern with pain and a crawling sensation and restless feeling in both legs for the past 1 month.  Has intermittent low back pain but never had sciatica or disc problems in the past.  Patient denies recent history of travel, leg trauma, leg swelling denies problem with snoring, sleep issues  Does not have underlying history of anemia, abnormal bleeding, thyroid disorder      Duration: 1 month    Description (location/character/radiation): bilateral, worse on left leg    Intensity:  mild    Accompanying signs and symptoms: none    History (similar episodes/previous evaluation): None    Precipitating or alleviating factors: None    Therapies tried and outcome: None          Pelvic Pain      Duration: 1 month    Description    vaginal discharge - mild with no odor color    Accompanying signs and symptoms (fever/dysuria/abdominal or back pain): burning with urination, lower abdominal pain    History  Sexually active: yes, single partner, contraception - Depo Provera  Possibility of pregnancy: Don't Know  Recent antibiotic use: no     Precipitating or alleviating factors: None    Therapies tried and outcome: none         Thyroid -  follow up previous lab results per patient  Patient is not sure if she was told she has abnormal thyroid labs are abnormal cholesterol but wants to be reviewed by this provider today.    ABDOMINAL BLOATING-complaining of feeling intermittent bloating in the abdomen associated with the above-mentioned symptoms as in the pelvic pain and mild acid reflux with no concerns for nausea, vomiting, diarrhea, constipation, UTI symptoms.    Patient was on hormonal contraception before, due to intermittent vaginal discharge, this was switched to Depo injection.  Patient feels since she started on dEPO, she feels her abdominal bloating.      Problem list and histories reviewed & adjusted, as indicated.  Additional history: as documented    Patient Active Problem List   Diagnosis     Cervical radiculopathy     Lumbago     Past Surgical History:   Procedure Laterality Date     APPENDECTOMY        SECTION N/A 7/3/2017    Procedure:  SECTION;  Primary  Section ;  Surgeon: Luz Maria Kwan MD;  [facility]     HC INSERTION INTRAUTERINE DEVICE       HC REMOVE INTRAUTERINE DEVICE         Social History   Substance Use Topics     Smoking status: Never Smoker     Smokeless tobacco: Never Used     Alcohol use No     Family History   Problem Relation Age of Onset     Cerebrovascular Disease Maternal Grandfather      HEART DISEASE Maternal Grandfather      Other - See Comments Daughter      Hydrocephaly         Current Outpatient Prescriptions   Medication Sig Dispense Refill     augmented betamethasone dipropionate (DIPROLENE-AF) 0.05 % cream Apply sparingly to affected area twice daily as needed.  Do not apply to face. 50 g 0     medroxyPROGESTERone (DEPO-PROVERA) 150 MG/ML injection Inject 1 mL (150 mg) into the muscle every 3 months 3 mL 3     ranitidine (ZANTAC) 150 MG tablet Take 1 tablet (150 mg) by mouth 2 times daily 60 tablet 0     medroxyPROGESTERone (DEPO-PROVERA) 150 MG/ML  injection Inject 1 mL (150 mg) into the muscle every 3 months (Patient not taking: Reported on 10/31/2018) 1 mL 3     No Known Allergies  Recent Labs   Lab Test  10/31/18   1552  08/27/18   0929  07/04/17   2215   LDL   --   65   --    HDL   --   47*   --    TRIG   --   34   --    ALT  84*   --   12   CR  0.77   --   0.71   GFRESTIMATED  87   --   >90  Non  GFR Calc     GFRESTBLACK  >90   --   >90   GFR Calc     POTASSIUM  3.6   --   4.2   TSH   --   1.89   --       BP Readings from Last 3 Encounters:   10/31/18 106/71   08/27/18 102/66   07/24/18 94/62    Wt Readings from Last 3 Encounters:   10/31/18 120 lb 8 oz (54.7 kg)   08/27/18 118 lb 1.6 oz (53.6 kg)   07/24/18 119 lb (54 kg)                  Labs reviewed in EPIC    Reviewed and updated as needed this visit by clinical staff  Tobacco  Allergies  Meds  Med Hx  Surg Hx  Fam Hx  Soc Hx      Reviewed and updated as needed this visit by Provider         ROS:  CONSTITUTIONAL: NEGATIVE for fever, chills, change in weight  INTEGUMENTARY/SKIN: as above  RESP: NEGATIVE for significant cough or SOB  CV: NEGATIVE for chest pain, palpitations or peripheral edema  GI: as above  : as above  MUSCULOSKELETAL: as above  NEURO: NEGATIVE for weakness, dizziness or paresthesias  ENDOCRINE: NEGATIVE for temperature intolerance, skin/hair changes  HEME/ALLERGY/IMMUNE: NEGATIVE for bleeding problems  PSYCHIATRIC: NEGATIVE for changes in mood or affect    OBJECTIVE:     /71 (BP Location: Right arm, Patient Position: Sitting, Cuff Size: Adult Regular)  Pulse 82  Temp 98.4  F (36.9  C) (Oral)  Wt 120 lb 8 oz (54.7 kg)  SpO2 96%  BMI 24.13 kg/m2  Body mass index is 24.13 kg/(m^2).  GENERAL: healthy, alert and no distress  NECK: no adenopathy, no asymmetry, masses, or scars and thyroid normal to palpation  RESP: lungs clear to auscultation - no rales, rhonchi or wheezes  CV: regular rate and rhythm, normal S1 S2, no S3 or S4, no  murmur, click or rub, no peripheral edema and peripheral pulses strong  ABDOMEN: soft, nontender, no hepatosplenomegaly, no masses and bowel sounds normal  MS: no gross musculoskeletal defects noted, no edema  MS: Normal gait  SKIN: Erythematous, dry, vesicular and patchy eruptions along the LEFT ring finger and dorsum of right hand  NEURO: Normal strength and tone, mentation intact and speech normal  BACK: no CVA tenderness, no paralumbar tenderness  Comprehensive back pain exam:  No tenderness, Range of motion not limited by pain, Lower extremity strength functional and equal on both sides, Lower extremity reflexes within normal limits bilaterally, Lower extremity sensation normal and equal on both sides and Straight leg raise negative bilaterally  PSYCH: mentation appears normal, affect normal/bright    Diagnostic Test Results:  none     ASSESSMENT/PLAN:             1. Dyshidrotic eczema  Reviewed the etio path, recommended liberal use of moisturisers, use topical steroids as needed. Dosing and potential medication side effects discussed.    - augmented betamethasone dipropionate (DIPROLENE-AF) 0.05 % cream; Apply sparingly to affected area twice daily as needed.  Do not apply to face.  Dispense: 50 g; Refill: 0    2. Bilateral leg pain  DDX-nonspecific/check for anemia/vitamin D deficiency, vitamin B12 deficiency, low ferritin  Reviewed normal thyroid labs with patient and reassured  Will f/u on results and call with recommendations.    - CBC with platelets and differential  - Comprehensive metabolic panel  - Ferritin  - Vitamin D Deficiency  - Vitamin B12  - Methylmalonic acid    3. Screening for thyroid disorder  TSH   Date Value Ref Range Status   08/27/2018 1.89 0.40 - 4.00 mU/L Final     .    4. Low HDL (under 40)  Lab Results   Component Value Date    CHOL 119 08/27/2018     Lab Results   Component Value Date    HDL 47 08/27/2018     Lab Results   Component Value Date    LDL 65 08/27/2018     Lab Results    Component Value Date    TRIG 34 08/27/2018     No results found for: CHOLHDLRATIO  Reviewed slightly low HDL, reviewed healthy eating, regular exercises.    5. Dyspepsia  Likely reason for her abdominal bloating.  We will check stool H. pylori, will start on Zantac 150 milligrams 1-2 times a day as needed  Patient wants to hold off on her double shot for the next 3 months and see how she feels in terms of symptom relief  Dosing and potential medication side effects discussed.  Patient verbalised understanding and is agreeable to the plan.    - CBC with platelets and differential  - Comprehensive metabolic panel  - H Pylori antigen, stool; Future  - ranitidine (ZANTAC) 150 MG tablet; Take 1 tablet (150 mg) by mouth 2 times daily  Dispense: 60 tablet; Refill: 0    6. Bloating  as above    - H Pylori antigen, stool; Future  - ranitidine (ZANTAC) 150 MG tablet; Take 1 tablet (150 mg) by mouth 2 times daily  Dispense: 60 tablet; Refill: 0    7. Restless leg  As above in problem #2   - CBC with platelets and differential  - Comprehensive metabolic panel  - Ferritin  - Vitamin D Deficiency  - Vitamin B12  - Methylmalonic acid    Chart documentation done in part with Dragon Voice recognition Software. Although reviewed after completion, some word and grammatical error may remain.    See Patient Instructions    Robson Wilson MD  [facility]

## 2018-11-01 LAB
DEPRECATED CALCIDIOL+CALCIFEROL SERPL-MC: 23 UG/L (ref 20–75)
VIT B12 SERPL-MCNC: 434 PG/ML (ref 193–986)

## 2018-11-02 DIAGNOSIS — R10.13 DYSPEPSIA: ICD-10-CM

## 2018-11-02 DIAGNOSIS — R14.0 BLOATING: ICD-10-CM

## 2018-11-02 PROCEDURE — 87338 HPYLORI STOOL AG IA: CPT | Performed by: FAMILY MEDICINE

## 2018-11-03 LAB — METHYLMALONATE SERPL-SCNC: 0.16 UMOL/L (ref 0–0.4)

## 2018-11-05 LAB
H PYLORI AG STL QL IA: ABNORMAL
SPECIMEN SOURCE: ABNORMAL

## 2018-11-07 ENCOUNTER — TELEPHONE (OUTPATIENT)
Dept: PEDIATRICS | Facility: CLINIC | Age: 33
End: 2018-11-07

## 2018-11-07 DIAGNOSIS — A04.8 H. PYLORI INFECTION: Primary | ICD-10-CM

## 2018-11-07 NOTE — TELEPHONE ENCOUNTER
Called patient with .  Patient at an appt asked to call her back at 3:30pm.    Cady Whittaker RN,   [facility], [facility]

## 2018-11-07 NOTE — TELEPHONE ENCOUNTER
Left message for patient  to return call to clinic and ask to speak with RN.    If patient returns call and nurse unavailable, please ask patient if a detailed message can be left on voicemail.    Cady Whittaker RN,   [facility]

## 2018-11-07 NOTE — TELEPHONE ENCOUNTER
Patient given results below.  Patient verbalized understanding.      Cady Whittaker RN,   [facility]

## 2018-11-07 NOTE — TELEPHONE ENCOUNTER
Please inform patient of positive stool test results on H. pylori likely causing her abdominal symptoms.  I have sent a prescription for Prevpac to take twice a day for the next 2 weeks  We will need to have a stool testing in 4 weeks after completion of treatment to confirm resolution  Future labs ordered.  Other blood test results including hemoglobin, blood counts, vitamin D and B12 are all within normal range.

## 2018-11-08 ENCOUNTER — TELEPHONE (OUTPATIENT)
Dept: OBGYN | Facility: CLINIC | Age: 33
End: 2018-11-08

## 2018-11-08 NOTE — TELEPHONE ENCOUNTER
Patient called using a . Patient stated she has an appt on 11-12-18 to have her next Depo Provera injection. Patient verbalized frustration that she has been bleeding since 10-01-18. Reviewed patient's chart and noted patient had BTB on Nexplanon. Recommended she be seen by Dr. Lee to discuss BTB and then she can decide if she wants to continue with Depo Provera. Explained first available appt is on 11-26-18 at 1330. Patient agreed to schedule. Explained that she will still be w/i Depo Provera window for next injection if she desires to continue. Explained will cancel ancillary appt for 11-12-18. Patient was appreciative of assistance. Jessica Calvert RN, [facility]

## 2018-11-26 ENCOUNTER — OFFICE VISIT (OUTPATIENT)
Dept: OBGYN | Facility: CLINIC | Age: 33
End: 2018-11-26
Payer: COMMERCIAL

## 2018-11-26 VITALS
HEART RATE: 86 BPM | OXYGEN SATURATION: 99 % | HEIGHT: 59 IN | SYSTOLIC BLOOD PRESSURE: 116 MMHG | DIASTOLIC BLOOD PRESSURE: 77 MMHG | WEIGHT: 123.5 LBS | BODY MASS INDEX: 24.9 KG/M2

## 2018-11-26 DIAGNOSIS — Z30.42 ENCOUNTER FOR SURVEILLANCE OF INJECTABLE CONTRACEPTIVE: ICD-10-CM

## 2018-11-26 DIAGNOSIS — Z00.00 ANNUAL PHYSICAL EXAM: Primary | ICD-10-CM

## 2018-11-26 PROCEDURE — 96372 THER/PROPH/DIAG INJ SC/IM: CPT | Performed by: OBSTETRICS & GYNECOLOGY

## 2018-11-26 PROCEDURE — 99395 PREV VISIT EST AGE 18-39: CPT | Mod: 25 | Performed by: OBSTETRICS & GYNECOLOGY

## 2018-11-26 RX ORDER — MEDROXYPROGESTERONE ACETATE 150 MG/ML
150 INJECTION, SUSPENSION INTRAMUSCULAR
Qty: 3 ML | Refills: 3 | OUTPATIENT
Start: 2018-11-26 | End: 2018-11-26

## 2018-11-26 ASSESSMENT — ANXIETY QUESTIONNAIRES
IF YOU CHECKED OFF ANY PROBLEMS ON THIS QUESTIONNAIRE, HOW DIFFICULT HAVE THESE PROBLEMS MADE IT FOR YOU TO DO YOUR WORK, TAKE CARE OF THINGS AT HOME, OR GET ALONG WITH OTHER PEOPLE: NOT DIFFICULT AT ALL
GAD7 TOTAL SCORE: 3
3. WORRYING TOO MUCH ABOUT DIFFERENT THINGS: SEVERAL DAYS
2. NOT BEING ABLE TO STOP OR CONTROL WORRYING: NOT AT ALL
5. BEING SO RESTLESS THAT IT IS HARD TO SIT STILL: SEVERAL DAYS
7. FEELING AFRAID AS IF SOMETHING AWFUL MIGHT HAPPEN: NOT AT ALL
1. FEELING NERVOUS, ANXIOUS, OR ON EDGE: NOT AT ALL
6. BECOMING EASILY ANNOYED OR IRRITABLE: SEVERAL DAYS

## 2018-11-26 ASSESSMENT — PATIENT HEALTH QUESTIONNAIRE - PHQ9
SUM OF ALL RESPONSES TO PHQ QUESTIONS 1-9: 5
5. POOR APPETITE OR OVEREATING: NOT AT ALL

## 2018-11-26 NOTE — MR AVS SNAPSHOT
After Visit Summary   11/26/2018    Jo Ann Patel    MRN: [id number]           Patient Information     Date Of Birth          1985        Visit Information        Provider Department      11/26/2018 1:30 PM Michelle Lee DO; [employer] [facility]        Care Instructions                                                         If you have any questions regarding your visit, Please contact your care team.    [facility]: [phone]      [facility] CLINIC HOURS TELEPHONE NUMBER   Michelle Lee DO.    SHAN Moser -    FRANCOISE Lopez       Monday, Wednesday, [facility]  8:30a.m-5:00 p.m   [facility]  [address] [place] [address]  [phone] ask for [facility]    Imaging [phone]       Urgent Care locations:    [facility] Saturday and Sunday   9 am - 5 pm    Monday-Friday   12 pm - 8 pm  Saturday and Sunday   9 am - 5 pm   [phone]     [facility] Labor and Delivery:  [phone]    If you need a medication refill, please contact your pharmacy. Please allow 3 business days for your refill to be completed.  As always, Thank you for trusting us with your healthcare needs!                Follow-ups after your visit        Who to contact     If you have questions or need follow up information about today's clinic visit or your schedule please contact [facility] directly at [phone].  Normal or non-critical lab and imaging results will be communicated to you by MyChart, letter or phone within 4 business days after the clinic has received the results. If you do not hear from us within 7 days, please contact the clinic through MyChart or phone. If you have a critical or abnormal lab result, we will notify you by phone as soon as possible.  Submit refill requests  "through [employer] or call your pharmacy and they will forward the refill request to us. Please allow 3 business days for your refill to be completed.          Additional Information About Your Visit        Care EveryWhere ID     This is your Care EveryWhere ID. This could be used by other organizations to access your [place] medical records  [id number]        Your Vitals Were     Pulse Height Pulse Oximetry Breastfeeding? BMI (Body Mass Index)       86 4' 11.25\" (1.505 m) 99% No 24.73 kg/m2        Blood Pressure from Last 3 Encounters:   11/26/18 116/77   10/31/18 106/71   08/27/18 102/66    Weight from Last 3 Encounters:   11/26/18 123 lb 8 oz (56 kg)   10/31/18 120 lb 8 oz (54.7 kg)   08/27/18 118 lb 1.6 oz (53.6 kg)              Today, you had the following     No orders found for display         Today's Medication Changes          These changes are accurate as of 11/26/18  2:03 PM.  If you have any questions, ask your nurse or doctor.               These medicines have changed or have updated prescriptions.        Dose/Directions    medroxyPROGESTERone 150 MG/ML injection   Commonly known as:  DEPO-PROVERA   This may have changed:  Another medication with the same name was removed. Continue taking this medication, and follow the directions you see here.   Used for:  Encounter for other contraceptive management   Changed by:  Michelle Lee,         Dose:  150 mg   Inject 1 mL (150 mg) into the muscle every 3 months   Quantity:  3 mL   Refills:  3                Primary Care Provider Office Phone # Fax #    [facility] [phone] [address]  [facility] [address]        Equal Access to Services     [facility] Hospital[facility]UAQUIN : Hadliliana Crocker, qaasim cunningham. So [facility] [phone].    ATENCIÓN: Si habla español, tiene a schafer disposición servicios gratuitos de asistencia lingüística. " Ralph rojo [phone].    We comply with applicable federal civil rights laws and [place] laws. We do not discriminate on the basis of race, color, national origin, age, disability, sex, sexual orientation, or gender identity.            Thank you!     Thank you for choosing [facility]  for your care. Our goal is always to provide you with excellent care. Hearing back from our patients is one way we can continue to improve our services. Please take a few minutes to complete the written survey that you may receive in the mail after your visit with us. Thank you!             Your Updated Medication List - Protect others around you: Learn how to safely use, store and throw away your medicines at www.disposemymeds.org.          This list is accurate as of 11/26/18  2:03 PM.  Always use your most recent med list.                   Brand Name Dispense Instructions for use Diagnosis    augmented betamethasone dipropionate 0.05 % cream    DIPROLENE-AF    50 g    Apply sparingly to affected area twice daily as needed.  Do not apply to face.    Dyshidrotic eczema       medroxyPROGESTERone 150 MG/ML injection    DEPO-PROVERA    3 mL    Inject 1 mL (150 mg) into the muscle every 3 months    Encounter for other contraceptive management       ranitidine 150 MG tablet    ZANTAC    60 tablet    Take 1 tablet (150 mg) by mouth 2 times daily    Bloating, Dyspepsia

## 2018-11-26 NOTE — PATIENT INSTRUCTIONS
If you have any questions regarding your visit, Please contact your care team.    [employer] [facility]: [phone]      [facility] CLINIC HOURS TELEPHONE NUMBER   Michelle Lee DO.    SHAN Moser -    FRANCOISE Lopez       Monday, Wednesday, Thursday and Friday, [place]  8:30a.m-5:00 p.m   [facility]  [address] [place] [address]  [phone] ask for [facility]    Imaging [phone]       Urgent Care locations:    [facility] Saturday and Sunday   9 am - 5 pm    Monday-Friday   12 pm - 8 pm  Saturday and Sunday   9 am - 5 pm   [phone]     [facility] Labor and Delivery:  [phone]    If you need a medication refill, please contact your pharmacy. Please allow 3 business days for your refill to be completed.  As always, Thank you for trusting us with your healthcare needs!

## 2018-11-26 NOTE — PROGRESS NOTES
"Jo Ann is a 33 year old female, , who is here for her annual exam.  She has experienced break-through-bleeding on the past on Depo Provera but this ended on 18 and she would like her next Depo Provera injection today since due.  If her abnormal spotting restarts, then she will call back to start on po estrogen.  She declines a flu vaccine and labs.    ROS: Ten point review of systems was reviewed and negative except the above.    Health Maintenance   Topic Date Due     INFLUENZA VACCINE (1) 2018     JOVANY QUESTIONNAIRE 1 YEAR  2019     PHQ-9 Q1YR  2019     PAP Q3 YR  2020     HPV Q3 Years  2020     TETANUS IMMUNIZATION (SYSTEM ASSIGNED)  2027     HIV SCREEN (SYSTEM ASSIGNED)  Completed      Last pap: not due until 2020  Last Mammogram: not due  Last Dexa: not due  Last Colonoscopy: not due  Lab Results   Component Value Date    CHOL 119 2018     Lab Results   Component Value Date    HDL 47 2018     Lab Results   Component Value Date    LDL 65 2018     Lab Results   Component Value Date    TRIG 34 2018     No results found for: CHOLHDLRATIO      OBHX:      PSH:   Past Surgical History:   Procedure Laterality Date     APPENDECTOMY        SECTION N/A 7/3/2017    Procedure:  SECTION;  Primary  Section ;  Surgeon: Luz Maria Kwan MD;  [facility]     HC INSERTION INTRAUTERINE DEVICE       HC REMOVE INTRAUTERINE DEVICE           PMH: Her past medical, surgical, and obstetric histories were reviewed and are documented in their appropriate chart areas.    ALL/Meds: Her medication and allergy histories were reviewed and are documented in their appropriate chart areas.    SH/FMH: Her social and family history was reviewed and documented in its appropriate chart area.    PE: /77  Pulse 86  Ht 4' 11.25\" (1.505 m)  Wt 123 lb 8 oz (56 kg)  SpO2 99%  Breastfeeding? No  BMI 24.73 kg/m2  Body mass " index is 24.73 kg/(m^2).    General Appearance:  healthy, alert, active, no distress  Cardiovascular:  Regular rate and Rhythm without murmur  Neck: Supple, no adenopathy and thyroid normal  Lungs:  Clear, without wheeze, rale or rhonchi  Breast: normal breast exam  Abdomen: Benign, Soft, flat, non-tender, No masses, organomegaly, No inguinal nodes and Bowel sounds normoactive.   Pelvic:       - Ext: Vulva and perineum are normal without lesion, mass or discharge        - Urethra: normal without discharge        - Urethral Meatus: normal appearance       - Bladder: no tenderness, no masses       - Vagina: Normal mucosa, no discharge        - Cervix: normal and multiparous       - Uterus:Normal shape, position and consistency        - Adnexa: Normal without masses or tenderness       - Rectal: deferred    A/P:  Well Woman Exam, Depo Provera Refill for Contraception     -  I discussed the new pap recommendations regarding screening.  Explained the rationale for increased intervals between paps.  Questions asked and answered.  She does agree to this regiment.  Pap was not   -  BC: depoprovera with refill placed and next injection provided today   -  If the BTB restarts, then the plan is to add po estrogen (she will call)  Orders Placed This Encounter   Procedures     Medroxyprogesterone inj/1mg (Depo Provera J-Code)     INJECTION INTRAMUSCULAR OR SUB-Q      -  Encouraged self-breast exam   -  Encouraged low fat diet, regular exercise, and adequate calcium intake.    Michelle Lee, DO  FACOG, FACS

## 2018-11-26 NOTE — NURSING NOTE
The following medication was given:     MEDICATION: Depo Provera 150mg  ROUTE: IM  SITE: [facility]  : [employer]  LOT #: [id number]  EXP:07/2020  NEXT INJECTION DUE: 2/11/19 - 2/25/19   Provider: Dr. Lee

## 2018-11-27 ASSESSMENT — ANXIETY QUESTIONNAIRES: GAD7 TOTAL SCORE: 3

## 2019-02-11 ENCOUNTER — ALLIED HEALTH/NURSE VISIT (OUTPATIENT)
Dept: NURSING | Facility: CLINIC | Age: 34
End: 2019-02-11
Payer: COMMERCIAL

## 2019-02-11 DIAGNOSIS — Z30.42 ENCOUNTER FOR SURVEILLANCE OF INJECTABLE CONTRACEPTIVE: Primary | ICD-10-CM

## 2019-02-11 PROCEDURE — 99207 ZZC NO CHARGE NURSE ONLY: CPT

## 2019-02-11 PROCEDURE — 96372 THER/PROPH/DIAG INJ SC/IM: CPT

## 2019-02-11 RX ORDER — MEDROXYPROGESTERONE ACETATE 150 MG/ML
150 INJECTION, SUSPENSION INTRAMUSCULAR ONCE
Status: COMPLETED | OUTPATIENT
Start: 2019-02-11 | End: 2019-02-11

## 2019-02-11 RX ORDER — MEDROXYPROGESTERONE ACETATE 150 MG/ML
150 INJECTION, SUSPENSION INTRAMUSCULAR
Qty: 1 ML | Refills: 2 | Status: CANCELLED | OUTPATIENT
Start: 2019-02-11 | End: 2020-02-11

## 2019-02-11 RX ADMIN — MEDROXYPROGESTERONE ACETATE 150 MG: 150 INJECTION, SUSPENSION INTRAMUSCULAR at 11:14

## 2019-02-11 NOTE — NURSING NOTE
BP: Data Unavailable    LAST PAP/EXAM:   Lab Results   Component Value Date    PAP NIL 08/14/2017     URINE HCG:not indicated    The following medication was given:     MEDICATION: Depo Provera 150mg  ROUTE: IM  SITE: [facility]  : [employer]  LOT #: [id number]  EXP:02/20  NEXT INJECTION DUE: 4/29/19 - 5/13/19   Provider: Dr. Lee      Prior to injection, verified patient identity using patient's name and date of birth.  Due to injection administration, patient instructed to remain in clinic for 15 minutes  afterwards, and to report any adverse reaction to me immediately.    BP: Data Unavailable    LAST PAP/EXAM:   Lab Results   Component Value Date    PAP NIL 08/14/2017     URINE HCG:not indicated    NEXT INJECTION DUE: 4/29/19 - 5/13/19       Drug Amount Wasted:  None.  Vial/Syringe: Single dose vial  Expiration Date:  02/20

## 2019-04-03 ENCOUNTER — ANCILLARY PROCEDURE (OUTPATIENT)
Dept: ULTRASOUND IMAGING | Facility: CLINIC | Age: 34
End: 2019-04-03
Attending: FAMILY MEDICINE
Payer: COMMERCIAL

## 2019-04-03 ENCOUNTER — OFFICE VISIT (OUTPATIENT)
Dept: PEDIATRICS | Facility: CLINIC | Age: 34
End: 2019-04-03
Payer: COMMERCIAL

## 2019-04-03 VITALS
HEART RATE: 89 BPM | HEIGHT: 59 IN | BODY MASS INDEX: 25.14 KG/M2 | OXYGEN SATURATION: 99 % | TEMPERATURE: 98.4 F | WEIGHT: 124.7 LBS | SYSTOLIC BLOOD PRESSURE: 108 MMHG | DIASTOLIC BLOOD PRESSURE: 74 MMHG

## 2019-04-03 DIAGNOSIS — R14.0 ABDOMINAL BLOATING: ICD-10-CM

## 2019-04-03 DIAGNOSIS — M25.562 POSTERIOR KNEE PAIN, LEFT: ICD-10-CM

## 2019-04-03 DIAGNOSIS — R76.8 ELEVATED ANTINUCLEAR ANTIBODY (ANA) LEVEL: ICD-10-CM

## 2019-04-03 DIAGNOSIS — M79.605 PAIN OF LEFT LOWER EXTREMITY: ICD-10-CM

## 2019-04-03 DIAGNOSIS — L71.9 ACNE ROSACEA: ICD-10-CM

## 2019-04-03 DIAGNOSIS — R21 MALAR RASH: ICD-10-CM

## 2019-04-03 DIAGNOSIS — M79.605 PAIN OF LEFT LOWER EXTREMITY: Primary | ICD-10-CM

## 2019-04-03 DIAGNOSIS — A04.8 HELICOBACTER PYLORI INFECTION: ICD-10-CM

## 2019-04-03 PROCEDURE — 36415 COLL VENOUS BLD VENIPUNCTURE: CPT | Performed by: FAMILY MEDICINE

## 2019-04-03 PROCEDURE — 86038 ANTINUCLEAR ANTIBODIES: CPT | Performed by: FAMILY MEDICINE

## 2019-04-03 PROCEDURE — 99214 OFFICE O/P EST MOD 30 MIN: CPT | Performed by: FAMILY MEDICINE

## 2019-04-03 PROCEDURE — 93971 EXTREMITY STUDY: CPT | Mod: LT | Performed by: INTERNAL MEDICINE

## 2019-04-03 PROCEDURE — 86039 ANTINUCLEAR ANTIBODIES (ANA): CPT | Performed by: FAMILY MEDICINE

## 2019-04-03 PROCEDURE — 86225 DNA ANTIBODY NATIVE: CPT | Performed by: FAMILY MEDICINE

## 2019-04-03 RX ORDER — METRONIDAZOLE 7.5 MG/G
GEL TOPICAL 2 TIMES DAILY
Qty: 45 G | Refills: 0 | Status: SHIPPED | OUTPATIENT
Start: 2019-04-03 | End: 2019-07-26

## 2019-04-03 RX ORDER — CYCLOBENZAPRINE HCL 5 MG
2.5-5 TABLET ORAL
Qty: 30 TABLET | Refills: 0 | Status: SHIPPED | OUTPATIENT
Start: 2019-04-03 | End: 2019-09-23

## 2019-04-03 ASSESSMENT — MIFFLIN-ST. JEOR: SCORE: 1171.27

## 2019-04-03 ASSESSMENT — PAIN SCALES - GENERAL: PAINLEVEL: SEVERE PAIN (6)

## 2019-04-03 NOTE — RESULT ENCOUNTER NOTE
Please inform patient of negative leg ultrasound results with no concern for DVT, this is good.  Prescription sent for Flexeril to take once at bedtime for leg muscle spasm and cramping  If symptoms are not any better in 2 weeks, patient will follow-up in the clinic for further evaluation

## 2019-04-03 NOTE — PROGRESS NOTES
Flexeril    SUBJECTIVE:   Jo Ann Patel is a 34 year old female who presents to clinic today for the following health issues:    Patient is here with a   with concerns of having slightly itchy, reoccurring maculopapular rash on both cheeks for the past 4-5 months with no history of recent sun exposure, new topical or oral medications, previous similar symptoms in the past.  Patient denies family or personal history of autoimmune inflammatory disorders including lupus    Rash  Onset: 5 months    Description:   Location: face  Character: round, red  Itching (Pruritis): YES- sometimes itching on cheeks    Progression of Symptoms:  intermittent    Accompanying Signs & Symptoms:  Fever: no   Body aches or joint pain: no  Sore throat symptoms: no  Recent cold symptoms: YES- starting to have a cough    History:   Previous similar rash: no     Precipitating factors:   Exposure to similar rash: no   New exposures: None   Recent travel: no     Alleviating factors:  None    Therapies Tried and outcome: Moisturizing with lotion ([place], [place])    Musculoskeletal problem/pain    Complaining of pain in the back of the left knee and left upper leg for the past few weeks with no concerns for bruising, left leg swelling, history of fall or trauma, recent history of travel, long distance car ride, previous history of DVT.  Patient denies right-sided symptoms, low back pain, tingling, numbness or weakness of extremities.      Duration: 3 weeks    Description  Location: veins behind right knee    Intensity:  Moderate - Severe    Accompanying signs and symptoms: feels like a burning aching sensation    History  Previous similar problem: no   Previous evaluation:  none    Precipitating or alleviating factors:  Trauma or overuse: no  Aggravating factors include: standing    Therapies tried and outcome: nothing    Patient was recently treated for H. pylori infection, forgot to get the repeat stool testing after  the treatment to confirm complete resolution of the infection  She continues to have intermittent abdominal bloating.  Denies nausea, vomiting, heartburn, abdominal pain, change in bowel movements.    Problem list and histories reviewed & adjusted, as indicated.  Additional history: as documented    Patient Active Problem List   Diagnosis     Cervical radiculopathy     Lumbago     H. pylori infection     Past Surgical History:   Procedure Laterality Date     APPENDECTOMY        SECTION N/A 7/3/2017    Procedure:  SECTION;  Primary  Section ;  Surgeon: Luz Maria Kwan MD;  [facility]: [facility]     HC INSERTION INTRAUTERINE DEVICE       HC REMOVE INTRAUTERINE DEVICE         Social History     Tobacco Use     Smoking status: Never Smoker     Smokeless tobacco: Never Used   Substance Use Topics     Alcohol use: No     Family History   Problem Relation Age of Onset     Cerebrovascular Disease Maternal Grandfather      Heart Disease Maternal Grandfather      Other - See Comments Daughter         Hydrocephaly         Current Outpatient Medications   Medication Sig Dispense Refill     augmented betamethasone dipropionate (DIPROLENE-AF) 0.05 % cream Apply sparingly to affected area twice daily as needed.  Do not apply to face. 50 g 0     medroxyPROGESTERone (DEPO-PROVERA) 150 MG/ML injection Inject 1 mL (150 mg) into the muscle every 3 months 3 mL 3     metroNIDAZOLE (METROGEL) 0.75 % external gel Apply topically 2 times daily 45 g 0     ranitidine (ZANTAC) 150 MG tablet Take 1 tablet (150 mg) by mouth 2 times daily 60 tablet 0     No Known Allergies  Recent Labs   Lab Test 10/31/18  1552 18  0929 17  2215   LDL  --  65  --    HDL  --  47*  --    TRIG  --  34  --    ALT 84*  --  12   CR 0.77  --  0.71   GFRESTIMATED 87  --  >90  Non  GFR Calc     GFRESTBLACK >90  --  >90  African American GFR Calc     POTASSIUM 3.6  --  4.2   TSH  --  1.89  --       BP  "Readings from Last 3 Encounters:   04/03/19 108/74   11/26/18 116/77   10/31/18 106/71    Wt Readings from Last 3 Encounters:   04/03/19 56.6 kg (124 lb 11.2 oz)   11/26/18 56 kg (123 lb 8 oz)   10/31/18 54.7 kg (120 lb 8 oz)                  Labs reviewed in EPIC    Reviewed and updated as needed this visit by clinical staff       Reviewed and updated as needed this visit by Provider         ROS:  CONSTITUTIONAL: NEGATIVE for fever, chills, change in weight  INTEGUMENTARY/SKIN: as above  RESP: NEGATIVE for significant cough or SOB  CV: NEGATIVE for chest pain, palpitations or peripheral edema  GI: as above  MUSCULOSKELETAL: as above  NEURO: NEGATIVE for weakness, dizziness or paresthesias  PSYCHIATRIC: NEGATIVE for changes in mood or affect    OBJECTIVE:     /74 (BP Location: Right arm, Patient Position: Sitting, Cuff Size: Adult Regular)   Pulse 89   Temp 98.4  F (36.9  C) (Oral)   Ht 1.499 m (4' 11\")   Wt 56.6 kg (124 lb 11.2 oz)   SpO2 99%   BMI 25.19 kg/m    Body mass index is 25.19 kg/m .  GENERAL: healthy, alert and no distress  RESP: lungs clear to auscultation - no rales, rhonchi or wheezes  CV: regular rate and rhythm, normal S1 S2, no S3 or S4, no murmur, click or rub, no peripheral edema and peripheral pulses strong  ABDOMEN: soft, nontender, no hepatosplenomegaly, no masses and bowel sounds normal  MS: Normal gait  Left leg-moderate tenderness and prominent veins in the back of the left knee and left upper leg, no calf tenderness, leg swelling, warmth, redness  Full range of motion of the left knee, negative special testing, no joint line tenderness, no patellar effusion or apprehension  SKIN: Slightly erythematous, maculopapular rash over both cheeks and a few on the bridge of the nose  NEURO: Normal strength and tone, mentation intact and speech normal  PSYCH: mentation appears normal, affect normal/bright    Diagnostic Test Results:  none     ASSESSMENT/PLAN:             1. Pain of left " "lower extremity  ddx-left knee strain/?  DVT/venous insufficiency  Recommended left leg ultrasound for further evaluation to exclude DVT  Recent Results (from the past 24 hour(s))   US Lower Extremity Venous Duplex Left    Narrative    Exam: Ultrasound of the deep venous system of left leg dated 4/3/2019  11:36 AM    Clinical information: Pain and swelling    Comparison: 6/5/2017    Ordering provider: CORIN CAPELLAN    Technique: Gray-scale evaluation with compression and Doppler  assessment of deep venous system for spontaneous and phasic flow, as  well as the presence of distal augmentation. Color flow images  obtained as needed. Gray-scale images with compression of the great  saphenous vein obtained as needed.    Findings:    Left leg:    CFV: Thrombus: No, Phasic: Yes,   Femoral vein, proximal: Thrombus: No, Phasic: Yes,  Femoral vein, mid: Thrombus: No, Phasic: Yes,  Femoral vein, distal: Thrombus: No, Phasic: Yes,  Popliteal vein: Thrombus: No, Phasic: Yes,  PTV: Thrombus: No,       Impression    Impression:    No DVT in the left lower extremity.    Reference: \"Duplex Ultrasound in the Diagnosis of Lower-Extremity Deep  Venous Thrombosis\"- Danae Borjas MD, S; Benji Badlwin MD  (Circulation. 2014;129:917-921. http://circ.ahajournals.org )    I have personally reviewed the examination and initial interpretation  and I agree with the findings.    DEZ TORRES MD     Reviewed negative left leg ultrasound with no concern for DVT.  A prescription sent for Flexeril to take as needed at bedtime for leg muscle cramping  If symptoms are not any better in 2 weeks, patient will follow-up in the clinic for further evaluation  - US Lower Extremity Venous Duplex Left; Future    2. Posterior knee pain, left  as above    - US Lower Extremity Venous Duplex Left; Future    3. Helicobacter pylori infection  We will get repeat testing to confirm resolution of H. pylori symptoms  If abdominal bloating symptoms " continue, consider abdominal ultrasound for further evaluation to rule out liver or gallbladder pathologies including cholelithiasis.  - H Pylori antigen, stool; Future    4. Malar rash  ddx-acne rosacea/ ? Lupus  Recommended to get screening for lupus today  We will try metronidazole gel twice daily on cheeks for the next 4 weeks, consider dermatology consult  for persistent or worsening concerns  Dosing and potential medication side effects discussed.  Patient verbalised understanding and is agreeable to the plan.  Will f/u on results and call with recommendations.    - Anti Nuclear Renate IgG by IFA with Reflex  - metroNIDAZOLE (METROGEL) 0.75 % external gel; Apply topically 2 times daily  Dispense: 45 g; Refill: 0    5. Acne rosacea  as above    - metroNIDAZOLE (METROGEL) 0.75 % external gel; Apply topically 2 times daily  Dispense: 45 g; Refill: 0    6. Abdominal bloating  As above in problem #3      Chart documentation done in part with Dragon Voice recognition Software. Although reviewed after completion, some word and grammatical error may remain.    See Patient Instructions    Robson Wilson MD  [facility]

## 2019-04-04 LAB
ANA PAT SER IF-IMP: ABNORMAL
ANA SER QL IF: ABNORMAL
ANA TITR SER IF: ABNORMAL {TITER}

## 2019-04-08 LAB — DSDNA AB SER-ACNC: <1 IU/ML

## 2019-04-09 ENCOUNTER — TELEPHONE (OUTPATIENT)
Dept: PEDIATRICS | Facility: CLINIC | Age: 34
End: 2019-04-09

## 2019-04-09 NOTE — TELEPHONE ENCOUNTER
Notes recorded by Yasmeen Fish RN on 4/9/2019 at 3:43 PM CDT  Called patient with a .  Left message with phone number to call back.  Yasmeen Fish RN, [facility]    ------    Notes recorded by Robson Wilson MD on 4/8/2019 at 7:34 PM CDT  Please inform patient of negative screening test for lupus  We will follow-up with the treatment plan for the facial rash, follow-up in 2 months if no better by then or sooner if needed.

## 2019-04-09 NOTE — TELEPHONE ENCOUNTER
Patient called back and asked to call her back with a .    Called patient with .  Gave message per Dr. Wilson.  She verbalized agreement to plan.    She says thus far, there is no change.  She will give it another couple weeks and call back if needed.      Yasmeen Fish RN, [facility]

## 2019-04-23 DIAGNOSIS — A04.8 HELICOBACTER PYLORI INFECTION: ICD-10-CM

## 2019-04-23 PROCEDURE — 87338 HPYLORI STOOL AG IA: CPT | Performed by: FAMILY MEDICINE

## 2019-04-24 LAB
H PYLORI AG STL QL IA: NORMAL
SPECIMEN SOURCE: NORMAL

## 2019-04-24 NOTE — RESULT ENCOUNTER NOTE
Please inform patient of negative H. pylori test results that indicates resolution of previous H. pylori infection with treatment  If she continues to have abdominal bloating, will consider abdominal ultrasound for further evaluation, which she will let us know.  Robson Wilson MD

## 2019-04-30 ENCOUNTER — OFFICE VISIT (OUTPATIENT)
Dept: PEDIATRICS | Facility: CLINIC | Age: 34
End: 2019-04-30
Payer: COMMERCIAL

## 2019-04-30 VITALS
SYSTOLIC BLOOD PRESSURE: 98 MMHG | TEMPERATURE: 98 F | OXYGEN SATURATION: 98 % | BODY MASS INDEX: 25.59 KG/M2 | WEIGHT: 126.7 LBS | DIASTOLIC BLOOD PRESSURE: 62 MMHG | HEART RATE: 62 BPM

## 2019-04-30 DIAGNOSIS — R10.13 ABDOMINAL PAIN, EPIGASTRIC: ICD-10-CM

## 2019-04-30 DIAGNOSIS — R10.13 DYSPEPSIA: Primary | ICD-10-CM

## 2019-04-30 DIAGNOSIS — Z86.19 H/O HELICOBACTER INFECTION: ICD-10-CM

## 2019-04-30 PROCEDURE — 99214 OFFICE O/P EST MOD 30 MIN: CPT | Performed by: FAMILY MEDICINE

## 2019-04-30 ASSESSMENT — PAIN SCALES - GENERAL: PAINLEVEL: MODERATE PAIN (4)

## 2019-04-30 NOTE — PROGRESS NOTES
SUBJECTIVE:   Jo Ann Patel is a 34 year old female who presents to clinic today for the following   health issues:      ABDOMINAL   PAIN    Patient is here with a  with concerns of having ongoing pain in the epigastrium, left upper quadrant radiating to the right upper quadrant intermittently for the past 2 to 3 weeks with worsening symptoms in the past week associated with abdominal bloating and some chest  Denies concerns for diarrhea, constipation, rectal bleeding, melena, nausea, vomiting  Pain is worse right after eating  Denies UTI symptoms, hematuria, history of recurrent UTI, nephrolithiasis patient was recently diagnosed with H. pylori infection  In November 2018 that was treated and had a repeat stool testing to confirm the resolution which was negative  Patient drinks 2 to 3 cups of coffee daily, denies use of tobacco, alcohol.      oes eat very spicy ethnic diet  Denies use of NSAIDs.       Onset: 2 weeks, worse starting on Saturday evening    Description:   Character: unable to describe  Location: left upper quadrant left lower quadrant  Radiation: None    Intensity: moderate    Progression of Symptoms:  same and constant    Accompanying Signs & Symptoms:  Fever/Chills?: YES- chills  Gas/Bloating: YES  Nausea: no   Vomitting: no   Diarrhea?: no   Constipationno  Dysuria or Hematuria: no    History:   Trauma: no   Previous similar pain: YES   Previous tests done: none    Precipitating factors:   Does the pain change with:     Food: YES- worse after eating on Saturday     BM: YES- sometimes relief    Urination: no     Alleviating factors:  None    Therapies Tried and outcome: Ranitidine - relief    LMP:  Patient's last menstrual period was 04/17/2019 (exact date).             Additional history: as documented    Reviewed  and updated as needed this visit by clinical staff  Tobacco  Allergies  Meds         Reviewed and updated as needed this visit by Provider          Patient Active Problem List   Diagnosis     Cervical radiculopathy     Lumbago     H. pylori infection     Past Surgical History:   Procedure Laterality Date     APPENDECTOMY        SECTION N/A 7/3/2017    Procedure:  SECTION;  Primary  Section ;  Surgeon: Luz Maria Kwan MD;  [facility]: [facility]     HC INSERTION INTRAUTERINE DEVICE       HC REMOVE INTRAUTERINE DEVICE         Social History     Tobacco Use     Smoking status: Never Smoker     Smokeless tobacco: Never Used   Substance Use Topics     Alcohol use: No     Family History   Problem Relation Age of Onset     Cerebrovascular Disease Maternal Grandfather      Heart Disease Maternal Grandfather      Other - See Comments Daughter         Hydrocephaly         Current Outpatient Medications   Medication Sig Dispense Refill     cyclobenzaprine (FLEXERIL) 5 MG tablet Take 0.5-1 tablets (2.5-5 mg) by mouth nightly as needed for muscle spasms 30 tablet 0     metroNIDAZOLE (METROGEL) 0.75 % external gel Apply topically 2 times daily 45 g 0     ranitidine (ZANTAC) 150 MG tablet Take 1 tablet (150 mg) by mouth 2 times daily 60 tablet 0     augmented betamethasone dipropionate (DIPROLENE-AF) 0.05 % cream Apply sparingly to affected area twice daily as needed.  Do not apply to face. (Patient not taking: Reported on 2019) 50 g 0     medroxyPROGESTERone (DEPO-PROVERA) 150 MG/ML injection Inject 1 mL (150 mg) into the muscle every 3 months (Patient not taking: Reported on 2019) 3 mL 3     No Known Allergies  Recent Labs   Lab Test 10/31/18  1552 18  0929 17  2215   LDL  --  65  --    HDL  --  47*  --    TRIG  --  34  --    ALT 84*  --  12   CR 0.77  --  0.71   GFRESTIMATED 87  --  >90  Non  GFR Calc     GFRESTBLACK >90  --  >90  African American GFR Calc     POTASSIUM 3.6  --  4.2   TSH  --  1.89  --       BP Readings from Last 3 Encounters:   19 98/62   19 108/74   18  116/77    Wt Readings from Last 3 Encounters:   04/30/19 57.5 kg (126 lb 11.2 oz)   04/03/19 56.6 kg (124 lb 11.2 oz)   11/26/18 56 kg (123 lb 8 oz)                  Labs reviewed in EPIC    ROS:  CONSTITUTIONAL:NEGATIVE for fever, chills, change in weight  RESP:NEGATIVE for significant cough or SOB  CV: NEGATIVE for chest pain, palpitations or peripheral edema  GI: as above  : Negative  PSYCHIATRIC: NEGATIVE for changes in mood or affect    OBJECTIVE:     BP 98/62 (BP Location: Right arm, Patient Position: Sitting, Cuff Size: Adult Regular)   Pulse 62   Temp 98  F (36.7  C) (Oral)   Wt 57.5 kg (126 lb 11.2 oz)   LMP 04/17/2019 (Exact Date)   SpO2 98%   BMI 25.59 kg/m    Body mass index is 25.59 kg/m .  GENERAL: healthy, alert and no distress  RESP: lungs clear to auscultation - no rales, rhonchi or wheezes  CV: regular rate and rhythm, normal S1 S2, no S3 or S4, no murmur, click or rub, no peripheral edema and peripheral pulses strong  ABDOMEN: soft, moderate tenderness over the epigastrium, left upper quadrant and somewhat over the right upper quadrant no guarding or rigidity, no organomegaly, normal BS, no costovertebral angle tenderness  SKIN: no suspicious lesions or rashes  BACK: no CVA tenderness, no paralumbar tenderness  PSYCH: mentation appears normal, affect normal/bright    Diagnostic Test Results:  none     ASSESSMENT/PLAN:             1. Dyspepsia    ddx -functional/nonulcer dyspepsia/gastritis/peptic ulcer disease/?  Liver or gallbladder disease  Considered a trial of BPH, patient is worried about gallbladder problems given her recent attack occurring after food  Will get abdominal ultrasound for further evaluation  We will start on Prilosec 20 mg once daily for the next 4 weeks, follow-up if pain is not any better in 5 to 6 weeks or sooner if needed  Dosing and potential medication side effects discussed.  Patient had was given on dyspepsia and gastritis  Will f/u on results and call with  recommendations.  Reviewed reflux precautions, avoid caffeine and spicy foods  Patient verbalised understanding and is agreeable to the plan.    - US Abdomen Complete; Future    omeprazole (PRILOSEC) 20 MG DR capsule    2. Abdominal pain, epigastric  as above    - US Abdomen Complete; Future    omeprazole (PRILOSEC) 20 MG DR capsule  3. H/O Helicobacter infection  Previously treated and patient just had a repeat stool testing a week ago to confirm resolution of the infection after treatment  omeprazole (PRILOSEC) 20 MG DR capsule      Chart documentation done in part with Dragon Voice recognition Software. Although reviewed after completion, some word and grammatical error may remain.    See Patient Instructions          Robson Wilson MD  [facility]

## 2019-04-30 NOTE — PATIENT INSTRUCTIONS
Schedule for US today if possible  Start on PRILOSEC 20 mg once daily for 4 weeks    Patient Education     Dolor epigástrico (causa incierta)     El dolor epigástrico es dolor en la parte superior del abdomen. Puede ser un signo de enfermedad. Las causas frecuentes incluyen las siguientes:    Reflujo ácido (el ácido del estómago sube hacia el esófago)    Gastritis (irritación del revestimiento del estómago) La mayoría de las veces, es provocada por la administración de aspirinas o antiinflamatorios no esteroides (JARED) andie ibuprofeno, por la bacteria H. Pylori o por el consumo frecuente de alcohol.    Úlcera péptica    Inflamación del páncreas    Cálculos biliares    Infección en la vesícula biliar  El dolor puede ser sordo o quemante. Puede irradiarse hacia el pecho o la espalda. Puede jorge otros síntomas, andie eructos, sensación de hinchazón en el estómago, cólicos o dolor de hambre. Puede jorge pérdida de peso o poco apetito, náuseas o vómitos.  Puesto que el diagnóstico de schafer dolor es incierto, algunas veces se necesitarán pruebas adicionales. En algunos casos el médico tratará la enfermedad más probable para stephen si mejora antes de hacer pruebas adicionales.  Cuidados en el hogar  Medicamentos    Los antiácidos ayudan a neutralizar los ácidos típicos del estómago. Si no le gusta la fórmula líquida, puede probar las formas masticables. Podrá notar que unos le funcionan mejor que otros. El uso excesivo puede provocar diarrea o estreñimiento.    Los bloqueadores de ácido (bloqueadores H2) disminuyen la producción de ácido. Ejemplos de esto son cimetidina, famotidina y ranitidina.    Los inhibidores de ácido o inhibidores de la bomba de protones (PPI) disminuyen la producción de ácido de manera diferente de los bloqueadores. Usted puede descubrir que funcionan mejor, demarcus tardan un poco más en surtir efecto.  Algunos de ellos son omeprazol, lansoprazol, pantoprazol, rabeprazol y esomeprazol. Muchos de estos  medicamentos son de venta jana o se puede conseguir schafer fórmula genérica.    Komatke un antiácido de 30 a 60 minutos después de comer y a la hora de acostarse, demarcus no a la misma hora que un bloqueador de ácido.    Trate de no lexie antiinflamatorios no esteroides (JARED). La aspirina también puede provocar problemas, demarcus si la roberta para el corazón u otra razón médica, hable con schafer médico antes de suspenderla; usted no quisiera causar un problema peor, andie un ataque cardíaco o un accidente cerebrovascular.  Alimentación    Si ciertos alimentos parecen provocar dolor, trate de evitarlos. Los síntomas de la gastritis pueden empeorar con ciertas comidas. Limite o evite las comidas grasosas, fritas o muy condimentadas, así andie el café, el chocolate, las mentas y los alimentos muy ácidos andie los tomates o las frutas y los jugos cítricos (naranja, toronja, jessica).    Coma despacio y mastique tyler antes de tragar. Los síntomas de la gastritis pueden empeorar con ciertos alimentos.    Evite beber alcohol. [place] puede irritar el estómago.    No ingiera cafeína ni tabaco. [place] puede retrasar la recuperación y empeorar el problema.    Trate de comer comidas pequeñas con bocadillos entre ange y otra.    No coma aleksander 2 o 3 horas antes de acostarse.    Levante la cabecera de la cama si sufre síntomas aleksander la noche. [place] darian que los ácidos se acumulen en el esófago.  Visita de control  Programe ange visita de control con schafer proveedor de atención médica, o según lo que se le haya indicado.  Cuándo buscar atención médica  Llame a schafer proveedor de atención médica de inmediato ante cualquiera de los siguientes síntomas:    Dolor de estómago que empeora o se mueve hacia el lado derecho inferior del abdomen    Aparece dolor de pecho, o si el dolor empeora o se extiende hacia el pecho, la espalda, el frankie, el hombro o el brazo    Vómitos frecuentes (no puede retener líquidos en el estómago)    Rajinder en las heces o en el vómito  (color rojizo o negruzco)    Siente debilidad o mareos, se desmaya o tiene problemas para respirar    Fiebre de 100,4  F (38  C) o más josé antonio, o según lo que le haya indicado schafer proveedor de atención médica    Hinchazón del abdomen  Date Last Reviewed: 9/25/2015 2000-2018 [employer]. [address] [place] [address], [place] [address]. Todos los derechos reservados. Esta información no pretende sustituir la atención médica profesional. Sólo schafer médico puede diagnosticar y tratar un problema de jeannette.           Patient Education      Qué es la dispepsia funcional?    La dispepsia es un conjunto de síntomas de la parte superior del abdomen que están relacionados con la digestión. Es posible que se sienta lleno con demasiada rapidez después de comer, y que sienta dolor o ardor. También es posible que presente otros síntomas. En algunos casos, la dispepsia es causada por ange infección o un problema físico que se puede tratar. Sin embargo, la dispepsia funcional no es consecuencia de ange enfermedad. Los síntomas perales mucho tiempo (crónicos). Usted tendrá que aprender maneras de manejar astrid síntomas con el paso del tiempo. [place] puede incluir lexie medicamentos. También es posible que implique hacer cambios en schafer dieta y manejar schafer jeannette mental.   Cuáles son las causas de la dispepsia funcional?  Los especialistas todavía están descubriendo cuáles pueden ser astrid causas. Es probable que los síntomas se originen en un tracto digestivo que es muy sensible a ciertas cosas. Por ejemplo, el estrés y algunas comidas y bebidas. En algunos casos, los síntomas pueden comenzar después de ange infección con bacterias, virus o parásitos.  Síntomas de la dispepsia funcional  Los síntomas de estella meses o más de duración pueden incluir:    Sentirse lleno con demasiada rapidez    Eructar mucho    Ange sensación de ardor en la mitad de schafer pecho    Dolor que no se jesi después de evacuar los intestinos o liberar gases    Estómago  revuelto (náuseas) o vómitos después de comer    Sensación de hinchazón    Pérdida del apetito  Usted también puede tener síntomas del síndrome del intestino irritable (IBS, por astrid siglas en inglés) Estos pueden incluir estreñimiento o diarrea permanente.  Tratamiento de la dispepsia funcional  Quizás schafer proveedor de atención médica le recete un medicamento para ayudar a aliviar astrid síntomas. Es posible que usted tome mian o más de los siguientes:    Medicamento para reducir el ácido estomacal. Es posible que tome un antagonista de los receptores de la H2. O podría lexie un inhibidor de la bomba de protones (PPI, por astrid siglas en inglés). Estos medicamentos reducen la cantidad de ácido que produce schafer estómago.    Medicamento para aumentar el movimiento digestivo. También se conoce andie medicamento para la motilidad. Es posible que le indiquen metoclopramida.    Medicamento antidepresivo o ansiolítico. Algunos de estos tipos de medicamentos pueden ayudar a reducir los síntomas.    Medicamento para tratar ange bacteria del estómago. Si los análisis muestran que usted tiene ange bacteria en el estómago, le recetarán antibióticos.  Cómo vivir con dispepsia funcional  Para manejar schafer afección con el paso del tiempo, usted también tendrá que:    Cambiar schafer dieta. Cafeína, alcohol y alimentos grasos o picantes pueden causar síntomas en algunas personas. Puede ayudar llevar un diario de cuándo aparecen astrid síntomas y lo que usted estaba comiendo o bebiendo. [place] puede ayudarle a descubrir qué alimentos y bebidas evitar.    Concentrarse en schafer jeannette mental. La ansiedad, la depresión y el estrés también pueden causar síntomas en algunas personas.  Aprender maneras de manejar schafer jeannette mental puede ayudar a aliviar los síntomas. [place] puede incluir la orientación de un consejero.  Cuándo llamar a schafer proveedor de atención médica  Llame enseguida a schafer proveedor de atención médica si tiene alguno de los siguientes  síntomas:    Síntomas que no mejoran o empeoran    Síntomas nuevos    Vómito que no se detiene    Vómitos con campos    Heces negras alquitranadas o con campos    Pérdida de peso sin explicación   Date Last Reviewed: 6/1/2016 2000-2018 [employer]. [address] [place] [address], [place] [address]. All rights reserved. This information is not intended as a substitute for professional medical care. Always follow your healthcare professional's instructions.

## 2019-05-01 ENCOUNTER — ANCILLARY PROCEDURE (OUTPATIENT)
Dept: ULTRASOUND IMAGING | Facility: CLINIC | Age: 34
End: 2019-05-01
Attending: FAMILY MEDICINE
Payer: COMMERCIAL

## 2019-05-01 DIAGNOSIS — K82.8 GALLBLADDER SLUDGE: Primary | ICD-10-CM

## 2019-05-01 DIAGNOSIS — R10.13 ABDOMINAL PAIN, EPIGASTRIC: ICD-10-CM

## 2019-05-01 DIAGNOSIS — K30 NON-ULCER DYSPEPSIA: ICD-10-CM

## 2019-05-01 DIAGNOSIS — R10.13 DYSPEPSIA: ICD-10-CM

## 2019-05-01 DIAGNOSIS — K76.0 HEPATIC STEATOSIS: ICD-10-CM

## 2019-05-01 DIAGNOSIS — K21.9 GASTROESOPHAGEAL REFLUX DISEASE, ESOPHAGITIS PRESENCE NOT SPECIFIED: ICD-10-CM

## 2019-05-01 PROCEDURE — 76700 US EXAM ABDOM COMPLETE: CPT | Performed by: STUDENT IN AN ORGANIZED HEALTH CARE EDUCATION/TRAINING PROGRAM

## 2019-05-01 NOTE — RESULT ENCOUNTER NOTE
Please inform Farideh of ultrasound abdomen results-no concern for gallbladder stones, but has fatty liver changes and thick bile which could also cause her ongoing upper abdominal symptoms.  Will recommend nutrition consult for dietary recommendations, avoid fast and fried foods-please help her schedule for the consult  Start on omeprazole for 4 weeks as given yesterday  Follow-up in 2 months for recheck or sooner if needed

## 2019-05-06 ENCOUNTER — MEDICAL CORRESPONDENCE (OUTPATIENT)
Dept: HEALTH INFORMATION MANAGEMENT | Facility: CLINIC | Age: 34
End: 2019-05-06

## 2019-05-06 DIAGNOSIS — Z82.79 FAMILY HISTORY OF CONGENITAL OR GENETIC CONDITION: Primary | ICD-10-CM

## 2019-05-06 LAB — MISCELLANEOUS TEST: NORMAL

## 2019-05-06 PROCEDURE — 36415 COLL VENOUS BLD VENIPUNCTURE: CPT | Performed by: MEDICAL GENETICS

## 2019-05-17 ENCOUNTER — OFFICE VISIT (OUTPATIENT)
Dept: PEDIATRICS | Facility: CLINIC | Age: 34
End: 2019-05-17
Payer: COMMERCIAL

## 2019-05-17 VITALS
DIASTOLIC BLOOD PRESSURE: 74 MMHG | TEMPERATURE: 98.4 F | BODY MASS INDEX: 24.78 KG/M2 | OXYGEN SATURATION: 97 % | WEIGHT: 122.7 LBS | HEART RATE: 85 BPM | SYSTOLIC BLOOD PRESSURE: 110 MMHG

## 2019-05-17 DIAGNOSIS — R14.0 BLOATING: ICD-10-CM

## 2019-05-17 DIAGNOSIS — Z86.19 H/O HELICOBACTER INFECTION: ICD-10-CM

## 2019-05-17 DIAGNOSIS — R10.13 DYSPEPSIA: ICD-10-CM

## 2019-05-17 DIAGNOSIS — K82.4 GALLBLADDER POLYP: ICD-10-CM

## 2019-05-17 DIAGNOSIS — K76.0 HEPATIC STEATOSIS: ICD-10-CM

## 2019-05-17 DIAGNOSIS — R10.13 ABDOMINAL PAIN, EPIGASTRIC: Primary | ICD-10-CM

## 2019-05-17 PROCEDURE — 99214 OFFICE O/P EST MOD 30 MIN: CPT | Performed by: FAMILY MEDICINE

## 2019-05-17 ASSESSMENT — PAIN SCALES - GENERAL: PAINLEVEL: MODERATE PAIN (5)

## 2019-05-17 NOTE — PROGRESS NOTES
SUBJECTIVE:   Jo Ann Patel is a 34 year old female who presents to clinic today for the following   health issues:    Abdominal Pain  Patient is here with a   with concerns of having pain in the epigastric region for the past 3 days, describing it as a burning pain in the midepigastrium that is worse after drinking water  Patient had few visits in the past few months for the same current symptom, was recently diagnosed with H. pylori gastritis had a second stool testing done to confirm resolution of the symptoms after treatment  She also had abdominal ultrasound showing hepatic steatosis, patient wants to discuss about this further  She denies current concerns for nausea, vomiting, UTI symptoms, constipation, diarrhea  She feels chills when she gets the pain symptoms  Denies melena, hematemesis      Duration: 3 days, started Wednesday night    Description (location/character/radiation): started with epigastric cramping pain on Wedneday night and then burning epigastric pain after drinking water yesterday       Associated flank pain: None    Intensity:  moderate, severe    Accompanying signs and symptoms:        Fever/Chills: YES- chills       Gas/Bloating: no        Nausea/vomitting: no        Diarrhea: no        Dysuria or Hematuria: no     History (previous similar pain/trauma/previous testing): Yes    Precipitating or alleviating factors:       Pain worse with eating/BM/urination: No       Pain relieved by BM: no     Therapies tried and outcome: Papaya and homemade shakes - helps with relief    LMP:  Patient's last menstrual period was 05/02/2019 (exact date).          Additional history: as documented    Reviewed  and updated as needed this visit by clinical staff  Allergies  Meds         Reviewed and updated as needed this visit by Provider         Patient Active Problem List   Diagnosis     Cervical radiculopathy     Lumbago     H. pylori infection     Gastroesophageal reflux  disease, esophagitis presence not specified     Hepatic steatosis     Non-ulcer dyspepsia     Gallbladder sludge     Past Surgical History:   Procedure Laterality Date     APPENDECTOMY        SECTION N/A 7/3/2017    Procedure:  SECTION;  Primary  Section ;  Surgeon: Luz Maria Kwan MD;  Location: [facility]     HC INSERTION INTRAUTERINE DEVICE       HC REMOVE INTRAUTERINE DEVICE         Social History     Tobacco Use     Smoking status: Never Smoker     Smokeless tobacco: Never Used   Substance Use Topics     Alcohol use: No     Family History   Problem Relation Age of Onset     Cerebrovascular Disease Maternal Grandfather      Heart Disease Maternal Grandfather      Other - See Comments Daughter         Hydrocephaly         Current Outpatient Medications   Medication Sig Dispense Refill     cyclobenzaprine (FLEXERIL) 5 MG tablet Take 0.5-1 tablets (2.5-5 mg) by mouth nightly as needed for muscle spasms 30 tablet 0     metroNIDAZOLE (METROGEL) 0.75 % external gel Apply topically 2 times daily 45 g 0     omeprazole (PRILOSEC) 20 MG DR capsule Take 1 capsule (20 mg) by mouth daily 60 capsule 5     ranitidine (ZANTAC) 150 MG tablet Take 1 tablet (150 mg) by mouth 2 times daily 60 tablet 5     augmented betamethasone dipropionate (DIPROLENE-AF) 0.05 % cream Apply sparingly to affected area twice daily as needed.  Do not apply to face. (Patient not taking: Reported on 2019) 50 g 0     medroxyPROGESTERone (DEPO-PROVERA) 150 MG/ML injection Inject 1 mL (150 mg) into the muscle every 3 months (Patient not taking: Reported on 2019) 3 mL 3     No Known Allergies  Recent Labs   Lab Test 10/31/18  1552 18  0929 17  2215   LDL  --  65  --    HDL  --  47*  --    TRIG  --  34  --    ALT 84*  --  12   CR 0.77  --  0.71   GFRESTIMATED 87  --  >90  Non  GFR Calc     GFRESTBLACK >90  --  >90  African American GFR Calc     POTASSIUM 3.6  --  4.2   TSH  --   1.89  --       BP Readings from Last 3 Encounters:   05/17/19 110/74   04/30/19 98/62   04/03/19 108/74    Wt Readings from Last 3 Encounters:   05/17/19 55.7 kg (122 lb 11.2 oz)   04/30/19 57.5 kg (126 lb 11.2 oz)   04/03/19 56.6 kg (124 lb 11.2 oz)                  Labs reviewed in EPIC    ROS:  CONSTITUTIONAL: NEGATIVE for fever, chills, change in weight  INTEGUMENTARY/SKIN: NEGATIVE for worrisome rashes, moles or lesions  RESP: NEGATIVE for significant cough or SOB  CV: NEGATIVE for chest pain, palpitations or peripheral edema  GI: as above  : normal menstrual cycles  PSYCHIATRIC: NEGATIVE for changes in mood or affect    OBJECTIVE:     /74 (BP Location: Right arm, Patient Position: Sitting, Cuff Size: Adult Regular)   Pulse 85   Temp 98.4  F (36.9  C) (Oral)   Wt 55.7 kg (122 lb 11.2 oz)   LMP 05/02/2019 (Exact Date)   SpO2 97%   BMI 24.78 kg/m    Body mass index is 24.78 kg/m .  GENERAL: healthy, alert and no distress  RESP: lungs clear to auscultation - no rales, rhonchi or wheezes  CV: regular rate and rhythm, normal S1 S2, no S3 or S4, no murmur, click or rub, no peripheral edema and peripheral pulses strong  ABDOMEN: soft, moderate epigastric tenderness with no guarding or rigidity, no organomegaly, normal BS, no costovertebral angle tenderness  PSYCH: mentation appears normal, affect normal/bright    Diagnostic Test Results:  none     ASSESSMENT/PLAN:             1. Bloating  Due to severity of symptoms, recommended patient to increase the dose of omeprazole from 20 mg once a day to 20 mg twice a day which he continues with ranitidine 150 mg twice daily  If symptoms are not any better in 1 week, patient understands to call to schedule for upper GI endoscopy for further evaluation  - GASTROENTEROLOGY ADULT REF PROCEDURE ONLY [facility] [phone]; No Provider Preference  - ranitidine (ZANTAC) 150 MG tablet; Take 1 tablet (150 mg) by mouth 2 times daily  Dispense: 60 tablet; Refill:  5    2. Dyspepsia  as above    - omeprazole (PRILOSEC) 20 MG DR capsule; Take 1 capsule (20 mg) by mouth daily  Dispense: 60 capsule; Refill: 5  - GASTROENTEROLOGY ADULT REF PROCEDURE ONLY [facility] [phone]; No Provider Preference  - ranitidine (ZANTAC) 150 MG tablet; Take 1 tablet (150 mg) by mouth 2 times daily  Dispense: 60 tablet; Refill: 5    3. Abdominal pain, epigastric  as above    - omeprazole (PRILOSEC) 20 MG DR capsule; Take 1 capsule (20 mg) by mouth daily  Dispense: 60 capsule; Refill: 5  - GASTROENTEROLOGY ADULT REF PROCEDURE ONLY [facility] [phone]; No Provider Preference    4. H/O Helicobacter infection    - omeprazole (PRILOSEC) 20 MG DR capsule; Take 1 capsule (20 mg) by mouth daily  Dispense: 60 capsule; Refill: 5    5. Hepatic steatosis  Reviewed recent ultrasound showing gallbladder polyp and hepatic steatosis.  Reviewed etiology of the liver condition  Recommended to avoid foods with high glycemic index, fried foods, fast foods  Recommended to proceed with dietitian consult which she has already scheduled  Monitor    6. Gallbladder polyp  No further intervention at this time, monitor      Chart documentation done in part with Dragon Voice recognition Software. Although reviewed after completion, some word and grammatical error may remain.    See Patient Instructions    Robson Wilson MD  [facility], [place]

## 2019-05-17 NOTE — PATIENT INSTRUCTIONS
Increase PRILOSEC to 20mg twice daily  If symptoms are no better in 1 week, please call to schedule for upper GI endoscopy

## 2019-05-20 ENCOUNTER — HOSPITAL ENCOUNTER (OUTPATIENT)
Facility: AMBULATORY SURGERY CENTER | Age: 34
End: 2019-05-20
Attending: INTERNAL MEDICINE
Payer: COMMERCIAL

## 2019-06-03 NOTE — PROGRESS NOTES
Jo Ann and her  present to clinic today (along with their daughter) to complete consent and have their blood drawn for the parental samples needed for their daughter's whole exome sequencing.  This conversation took place through that aid of a .      We briefly reviewed again the process of whole exome sequencing (JUAQUIN).  JUAQUIN is a highly complex test that can identify changes in an individual s DNA that are causative or related to their medical concerns.  In contrast to current sequencing tests that analyze one gene or small groups of related genes at a time, JUAQUIN examines the exons (or coding regions) of thousands of genes simultaneously.  The exome refers to the portion of the human genome that contains functionally important sequences of DNA that direct the body to make proteins essential for the body to function properly. JUAQUIN is thought to be an efficient method of analyzing a patient s DNA to discover the genetic cause of diseases or disabilities.  We also briefly reviewed the possible information that we may be able to obtain and potential benefits and limitations of testing.  We reviewed this process of JUAQUIN in general and what it would entail, including why additional samples from family members (generally parents) is required.      We talked about that JUAQUIN may identify a specific genetic cause for some or all of their daughter's medical/developmental challenges.  However, we also talked about that sometimes even this broader type of genetic testing is not able to identify a specific, underlying cause for an individual's medical and/or developmental issues.  Additionally, we talked about that sometimes variants of unknown significance are identified, meaning that sometimes genetic changes are noted for which it is not currently known whether or not they may be associated with medical/developmental differences.       We reviewed that the JUAQUIN testing laboratory can also report the  "results of gene mutations that are found in several genes recommended by the [employer] College [employer] (ACMG) to be reported to JUAQUIN patients, even if the gene mutation does not contribute to their current symptoms. Many of these gene changes may not be associated with symptoms until adulthood and are not traditionally tested for in children. We talked about some of the potential benefits and limitations of finding out these \"secondary findings.\" Jo Ann and her  stated that they did want to be told of any significant findings in these recommended secondary findings ACMG list for themselves and for their daughter.     Jo Ann and her  stated that they didn't have any additional questions about the whole exome sequencing at this time.   Therefore, consent was signed and they both proceeded to the lab to have their blood drawn.     Yuko Prieto MS, [facility]  Genetic Counselor  Ph: [phone]     "

## 2019-06-06 PROBLEM — M54.50 LUMBAGO: Status: RESOLVED | Noted: 2018-07-31 | Resolved: 2019-06-06

## 2019-06-06 PROBLEM — M54.12 CERVICAL RADICULOPATHY: Status: RESOLVED | Noted: 2018-07-31 | Resolved: 2019-06-06

## 2019-06-06 NOTE — PROGRESS NOTES
Discharge Note    Progress reporting period is from last progress note on 09/17/18 to Oct 8, 2018.    Jo Ann failed to follow up and current status is unknown.  Please see information below for last relevant information on current status.  Patient seen for 10 visits.    SUBJECTIVE  Subjective changes noted by patient:  Patient reports improved neck and back pain this week.  Currently has a little bit of pain near the R scapula, 3/10.  Pain has been closer to 3/10 this week.  Has been able to use the R arm for holding baby somewhat easier.  .  Current pain level is 3/10.     Previous pain level was  6/10.   Changes in function:  Yes (See Goal flowsheet attached for changes in current functional level)  Adverse reaction to treatment or activity: None    OBJECTIVE  Changes noted in objective findings: CROM: see flowsheet.     ASSESSMENT/PLAN  Diagnosis: cervical radiculopathy   Updated problem list and treatment plan:   Pain - HEP  Decreased ROM/flexibility - HEP  Decreased function - HEP  STG/LTGs have been met or progress has been made towards goals:  Yes, please see goal flowsheet for most current information  Assessment of Progress: current status is unknown.    Last current status: Pt is progressing as expected   Self Management Plans:  HEP  I have re-evaluated this patient and find that the nature, scope, duration and intensity of the therapy is appropriate for the medical condition of the patient.  Jo Ann continues to require the following intervention to meet STG and LTG's:  HEP.    Recommendations:  Discharge with current home program.  Patient to follow up with MD as needed.    Please refer to the daily flowsheet for treatment today, total treatment time and time spent performing 1:1 timed codes.

## 2019-06-14 ENCOUNTER — OFFICE VISIT (OUTPATIENT)
Dept: NUTRITION | Facility: CLINIC | Age: 34
End: 2019-06-14
Attending: FAMILY MEDICINE
Payer: COMMERCIAL

## 2019-06-14 DIAGNOSIS — K76.0 HEPATIC STEATOSIS: Primary | ICD-10-CM

## 2019-06-14 DIAGNOSIS — K82.8 GALLBLADDER SLUDGE: ICD-10-CM

## 2019-06-14 DIAGNOSIS — K21.9 GASTROESOPHAGEAL REFLUX DISEASE, ESOPHAGITIS PRESENCE NOT SPECIFIED: ICD-10-CM

## 2019-06-14 DIAGNOSIS — K30 NON-ULCER DYSPEPSIA: ICD-10-CM

## 2019-06-14 PROCEDURE — 97802 MEDICAL NUTRITION INDIV IN: CPT | Performed by: DIETITIAN, REGISTERED

## 2019-06-14 NOTE — PROGRESS NOTES
Medical Nutrition Therapy  Visit Type:Initial assessment and intervention    Referring Provider: Robson Wilson  Internal (P) primary care provider     REASON FOR REFERRAL:   Jo Ann Patel presents today for MNT and education related to  Gallbladder sludge [K82.8]  - Primary       Non-ulcer dyspepsia [K30]       Hepatic steatosis [K76.0]       Gastroesophageal reflux disease, esophagitis presence not specified [K21.9]         She is accompanied by Radha Interpretor.     NUTRITION ASSESSMENT:   Patient comments/concerns relating to nutrition: Jo Ann Patel is a 34 year old female coming in for fatty liver, She experiences upper stomach pain, bloating, gas and reflux/heartburn after eating. She has noticed when she eats meats that she has stomach pain. She still experiences stomach pain with other meals too. Her BM's are daily and vary between Type 3 bristol stool and Type 5 mostly. Sometimes her stools can be type 2,4 bristol stool.     She has a BM sometimes in morning or afternoon. Most of the time her BM's are after eating. For example, when she has papaya she notices that helps her stomach and can have a bristol stool type 5. She tends to feel more constipated then loose which is why she eats the papaya and pineapple. Sometimes she can even go twice per day when adding in these foods. About 3 months ago she was diagnosed with H.pylori and got an antibiotic.  She got retested and it was negative. She didn't have as many digestive issues before the H.pyrlori diagnosis. After the treatment that is when her symptoms for heartburn/reflux, gas, bloating, stomach pain got worse.     When she got diagnosed with fatty liver she has been trying to increase her intake of plant based foods based on internet. She has noticed her symptoms have been better. The reflux has gone down and she has had reduced pain in stomach as well as gas. She tried steak and noticed that she got gas and pain in stomach.  She can tolerate poultry and fish. She notices that pork causes itching and bumps on face.     Before making these dietary changes she was doing more of refined carbs breads, corn, pancakes. She would do beans which she noticed it doesn't affect her as much as meat.     She hasnoticed that for the past 2 weeks her hair has been fulling out more than what it usually does. She did have labs done for vitamin b12, vitamin D, ferritin. Her labs were WNL         Food/Nutrition History:                                                                                Eating Patterns & Meal Planning:    Waking up at: 7-8am   Going to bed at: 1:30am-2am    Consuming Breakfast: Yes 7 times/week  Drinks water plain right when she wakes up 8oz   2 boiled eggs and 3/4 cups oatmeal sometimes has blueberries   Sometimes she thinks the food is going to be ok but it causes tons of issues for example plain oatmeal just water. Sometimes she has oatmeal and its ok.   The days she has blueberries on the oatmeal she seems to handle.     Consuming Lunch:  Yes 7 times/week  Sometimes she has chicken breast, rice white, asparagus and sometimes avocado     Chicken soup with potato, carrots, zucchini, sometimes tomatoes - she notices when she has chicken soup she has reflux     Consuming Dinner:  Yes 5 times/week  Sometimes she eats the same as lunch or will do salads (tuna, spinach, peppers, avocado) sometimes with quinoa. She tries to change so she doesn't get bored.       Snacks: Yes 7 times/week  She does a snack sometimes pineapple, papaya, greek yogurt (cows)  Smoothies for snack or breakfast - pineapple and papaya, arcenio vera water - just started in last month and notices it helps a lot     Skips meals/snacks: Yes  Dinner       Family History   Problem Relation Age of Onset     Cerebrovascular Disease Maternal Grandfather      Heart Disease Maternal Grandfather      Other - See Comments Daughter         Hydrocephaly          Pertinent Past  Medical History:   I have reviewed this patient's medical history and updated it with pertinent information if needed.   Past Medical History:   Diagnosis Date     Hepatic steatosis 2019     Wounds and injuries     Fell @ 19 wks, went to [facility]         Previous Surgeries:   I have reviewed this patient's surgical history and updated it with pertinent information if needed.  Past Surgical History:   Procedure Laterality Date     APPENDECTOMY        SECTION N/A 7/3/2017    Procedure:  SECTION;  Primary  Section ;  Surgeon: Luz Maria Kwan MD;  Location: [facility] L[facility]     HC INSERTION INTRAUTERINE DEVICE       HC REMOVE INTRAUTERINE DEVICE         Physical Findings:   Digestive System:stomach or abdominal pains, cramping , bloating , gas  and heartburn/acid reflux or indigestion     Normal Bowl Movements: No    # of Bowl movements: 1x per day   Irregular Bowl Movements: varies a lot     Medications and Supplements:  Current Outpatient Medications   Medication     augmented betamethasone dipropionate (DIPROLENE-AF) 0.05 % cream     cyclobenzaprine (FLEXERIL) 5 MG tablet     medroxyPROGESTERone (DEPO-PROVERA) 150 MG/ML injection     metroNIDAZOLE (METROGEL) 0.75 % external gel     omeprazole (PRILOSEC) 20 MG DR capsule     ranitidine (ZANTAC) 150 MG tablet     No current facility-administered medications for this visit.        LABS:  Last Basic Metabolic Panel:  Lab Results   Component Value Date     10/31/2018      Lab Results   Component Value Date    POTASSIUM 3.6 10/31/2018     Lab Results   Component Value Date    CHLORIDE 107 10/31/2018     Lab Results   Component Value Date    TASHIA 8.7 10/31/2018     Lab Results   Component Value Date    CO2 31 10/31/2018     Lab Results   Component Value Date    BUN 14 10/31/2018     Lab Results   Component Value Date    CR 0.77 10/31/2018     Lab Results   Component Value Date    GLC 82 10/31/2018       Last Glucose Profile:   No  "results found for: A1C    Last Lipid Profile:   Cholesterol   Date Value Ref Range Status   08/27/2018 119 <200 mg/dL Final     HDL Cholesterol   Date Value Ref Range Status   08/27/2018 47 (L) >49 mg/dL Final     LDL Cholesterol Calculated   Date Value Ref Range Status   08/27/2018 65 <100 mg/dL Final     Comment:     Desirable:       <100 mg/dl     Triglycerides   Date Value Ref Range Status   08/27/2018 34 <150 mg/dL Final     Comment:     Fasting specimen     No results found for: CHOLHDLRATIO    Most recent CBC:  Recent Labs   Lab Test 10/31/18  1552 04/09/18  1735   WBC 9.6 9.6   HGB 13.2 12.6   HCT 38.5 37.7    353     Most recent hepatic panel:  Recent Labs   Lab Test 10/31/18  1552 07/04/17  2215   ALT 84* 12   AST 32 25     Most recent creatinine:  Recent Labs   Lab Test 10/31/18  1552 07/04/17  2215   CR 0.77 0.71       Last Thyroid Profile:   TSH   Date Value Ref Range Status   08/27/2018 1.89 0.40 - 4.00 mU/L Final       Last Mineral Profile:   Ferritin   Date Value Ref Range Status   10/31/2018 27 12 - 150 ng/mL Final         Last Vitamin Profile:   No results found for: EOG595, RDCA081, HPNY31VOHHT, VITD3, D2VIT, D3VIT, DTOT, [id number], [id number], [id number], [id number], [id number], [id number]    ANTHROPOMETRICS:  Vitals:   BP Readings from Last 1 Encounters:   05/17/19 110/74     Pulse Readings from Last 1 Encounters:   05/17/19 85     Estimated body mass index is 24.78 kg/m  as calculated from the following:    Height as of 4/3/19: 1.499 m (4' 11\").    Weight as of 5/17/19: 55.7 kg (122 lb 11.2 oz).    Wt Readings from Last 5 Encounters:   05/17/19 55.7 kg (122 lb 11.2 oz)   04/30/19 57.5 kg (126 lb 11.2 oz)   04/03/19 56.6 kg (124 lb 11.2 oz)   11/26/18 56 kg (123 lb 8 oz)   10/31/18 54.7 kg (120 lb 8 oz)         NUTRITION DIAGNOSIS:   1. Altered GI function related to H.pyrlori gastitis as evidenced by gallbladder polyps,  irregular bowl movements, bloating, gas and stomach " pain      NUTRITION INTERVENTION:  Long Term Goals:  Goal: 1 bowl movement daily [place] Stool Type 4 soft and formed   Goal: Decrease digestive symptoms       Eat real anti-inflammatory food and boost liver function. When you eat whole, real foods in their unprocessed forms, you take the first step to healing our gut and overall health. Eat plenty of vegetables, fruits, nuts, seeds, and other plant foods. See recipes provided   Example of one weekly short term goal : smoothie for breakfast 5x per week     What foods to eat if you have a fatty liver? In general, you will want to incorporate more plant based diet foods and choose helpful fatty liver foods including vegetables, fruits and healthy fats.  The following helpful foods are known to decrease inflammation while also aiding the body in its use of insulin, which is often a problem for people with a fatty liver:  Foods high in monounsaturated fats like olives, olive oil, avocados and nuts.   Foods rich in omega 3 fatty acids including wild-caught fish like salmon and sardines, joel seeds, flaxseeds and walnuts.   High antioxidant foods that can help to protect the liver cells from damage, especially items rich in vitamin E, which research shows can improve the state of a fatty liver. Some healthy foods high in vitamin E include sunflower seeds and almonds.   Liver boosting vegetables including artichokes and bitter, leafy greens like mustard greens, chicory, arugula, and dandelion.   Green tea, which is rich in catechins, has been shown to help decrease body at and discourage obesity.    Strive for  grams of high quality fiber a day.    2-3 servings of colorful fruits/day     3-4 servings of vegetables/day     Add two tablespoons of ground flaxseed and or joel seeds into smoothies or salads provides an easy fiber boost.     Try 1/4 cup of nuts or seeds. If you have a hard time digesting the whole nuts, try nut butters (almond, sunflower seed butter etc  are great options)   A great recipe to start with is joel seed pudding topped with your favorite nuts and berries or smoothie for breakfast or snack.     Avoid inflammatory foods. Dairy tops the list. The proteins like casein and whey in dairy can irritate and inflame your gut, while gluten the protein in wheat, rye and barley is a close second. Give those foods up for at least three weeks and see if digestion, blood sugars, weight and overall health improve.    Eat high quality healthy fats. A high-fat based diet can accelerate gastric emptying and speeds up your metabolism.     Smart, healthy fat sources include wild fatty fish like salmon, herring sardines.     Try using extra virgin olive oil (which lubricates the digestive system), coconut, cold pressed sesame, avocado and other nut oils. 1 tsp to 1 tbsp of MCT oil from coconuts can be added into coffee, smoothies, and salad dressings.     Eat protein: Strive for 80g per day - (1oz = 7g). To avoid blood sugar imbalances consume around 21 g or 3oz per meal 3x/day and around 7-8g for snacks 2x per day. Eat clean, lean cuts and sustainably raised animal protein such as fish, turkey, chicken, nuts or nut butters and seeds.     Incorporate protein powder daily.     Plant based hemp (recommended brands: [place], Nutiva, Just Hemp Protein, [employer])      Plant based pea (recommended brands: Naked Pea, Now Sports). If you want to try a combo of pea and hemp the brand Mckenzie in vanilla or chocolate is a great option.     Try Bone broth protein powder or collagen peptides in liquid bone broth, vegetable broth or 12 oz of water as snack. The bone broth powder and collagen can be used for soups as well. This can help provide essential amino acids and minerals that heal your gut as well as balance blood sugars. A great option if you have a hard time tolerating solids.     Focus on high quality micro-nutrients.     Bring supplements in to review at follow up.      Increase physical activity. Moving our body helps move our bowels and speeds up your metabolism.     Exercise 15-60 minutes daily--whether that looks like burst training, yoga, or vigorous walking.    Drink more water. Hydration is critical, so drink at least six to eight glasses of water a day.    Limit alcohol, coffee, and added sugar from processed junk food. Sodas, juices, and diet drinks impact sugar and fat metabolism.    Review the labels watching out for high carb/high sugar foods that may be low in calories but not high in nutrients from healthy fats/protein - focus on trying to consume 6-15g or less or carbohydrates at snacks 2x per day and 15-30g or less of carbohydrates at meals 3x per day.      Watch out for added sugars on the label 0-<5g of sugar per serving is low    Track what you eat. Writing down or tracking through an tate what you eat as well as how you feel acn help you identify patterns in your symptoms. This can help you become more aware and creat a diet that is right for you.     Pick a food tracking tate:     Through the mysymptoms tate, you can track symptoms, bowl movements, medications, stress, exercise, sleep and foods as well as beverages to become more mindful. [email or web address]    Through the [employer] tate, you can focus more on calories and macronutrient's of foods to balance blood sugars or monitor weight. You can track blood sugars in the notes section along with symptoms, bowl movements, medications, stress, exercise, water intake and sleep.   Note: You don't have to journal forever and it is more important that you have consistent meals as well as snacks while focusing on adding in healthy foods.     Rebuild your friendly bacteria in your microbime. Start taking probiotic supplements. They will help rebuild the healthy bacteria so essential to good gut health.     Recommend trying BIOHM health to help break down digestive plaque and get in amylase and variety of  good bacteria.  Take 1 capsule daily anytime with our without food.    Order online - [employer] has better price than company.     Manage your stress. Stress can negatively impact the way you digest foods and absorb nutrients leading to more digestive issues (constipation, diarrhea, indigestion, nausea etc), imbalanced blood sugars and weight imbalances. Try to focus on the following relaxation techniques:     Regular exercise such as walking     Yoga    Meditation     Breathing techniques     Time management       NUTRITION RESOURCES:  [employer] Eat fat get think cook book -recipes provided     PATIENT'S BEHAVIOR CHANGE GOALS:   See nutrition intervention for patient stated behavior change goals. AVS was printed and given to patient at today's appointment.    MONITOR / EVALUATE:  Registered Dietitian will monitor/evaluate the following:     Beliefs and attitudes related to food    Food and nutrition knowledge / skills    Food / Beverage / Nutrient intake     Pertinent Labs    Progress toward meeting stated nutrition-related goals    Readiness to change nutrition-related behaviors    Weight change    Digestion     COORDINATION OF CARE:  Follow up with primary care provider       FOLLOW-UP:  Follow-up appointment scheduled in 1 month.       Time spent in minutes: 90 minutes 6 units   Encounter: Individual    Geeta Lemus RD, CLT, LD  Integrative Registered Dietitian

## 2019-06-14 NOTE — PATIENT INSTRUCTIONS
NUTRITION INTERVENTION:  Long Term Goals:  Goal: 1 bowl movement daily [place] Stool Type 4 soft and formed   Goal: Decrease digestive symptoms     Eat real anti-inflammatory food and boost liver function. When you eat whole, real foods in their unprocessed forms, you take the first step to healing our gut and overall health. Eat plenty of vegetables, fruits, nuts, seeds, and other plant foods. See recipes provided   Example of one weekly short term goal : smoothie for breakfast 5x per week     What foods to eat if you have a fatty liver? In general, you will want to incorporate more plant based diet foods and choose helpful fatty liver foods including vegetables, fruits and healthy fats.  The following helpful foods are known to decrease inflammation while also aiding the body in its use of insulin, which is often a problem for people with a fatty liver:  Foods high in monounsaturated fats like olives, olive oil, avocados and nuts.   Foods rich in omega 3 fatty acids including wild-caught fish like salmon and sardines, joel seeds, flaxseeds and walnuts.   High antioxidant foods that can help to protect the liver cells from damage, especially items rich in vitamin E, which research shows can improve the state of a fatty liver. Some healthy foods high in vitamin E include sunflower seeds and almonds.   Liver boosting vegetables including artichokes and bitter, leafy greens like mustard greens, chicory, arugula, and dandelion.   Green tea, which is rich in catechins, has been shown to help decrease body at and discourage obesity.    Strive for  grams of high quality fiber a day.    2-3 servings of colorful fruits/day     3-4 servings of vegetables/day     Add two tablespoons of ground flaxseed and or joel seeds into smoothies or salads provides an easy fiber boost.     Try 1/4 cup of nuts or seeds. If you have a hard time digesting the whole nuts, try nut butters (almond, sunflower seed butter etc are great  options)   A great recipe to start with is joel seed pudding topped with your favorite nuts and berries or smoothie for breakfast or snack.     Avoid inflammatory foods. Dairy tops the list. The proteins like casein and whey in dairy can irritate and inflame your gut, while gluten the protein in wheat, rye and barley is a close second. Give those foods up for at least three weeks and see if digestion, blood sugars, weight and overall health improve.    Eat high quality healthy fats. A high-fat based diet can accelerate gastric emptying and speeds up your metabolism.     Smart, healthy fat sources include wild fatty fish like salmon, herring sardines.     Try using extra virgin olive oil (which lubricates the digestive system), coconut, cold pressed sesame, avocado and other nut oils. 1 tsp to 1 tbsp of MCT oil from coconuts can be added into coffee, smoothies, and salad dressings.     Eat protein: Strive for 80g per day - (1oz = 7g). To avoid blood sugar imbalances consume around 21 g or 3oz per meal 3x/day and around 7-8g for snacks 2x per day. Eat clean, lean cuts and sustainably raised animal protein such as fish, turkey, chicken, nuts or nut butters and seeds.     Incorporate protein powder daily.     Plant based hemp (recommended brands: [place], OneSource Water, Just Hemp Protein, [employer])      Plant based pea (recommended brands: Naked Pea, Now Sports). If you want to try a combo of pea and hemp the brand Mckenzie in vanilla or chocolate is a great option.     Try Bone broth protein powder or collagen peptides in liquid bone broth, vegetable broth or 12 oz of water as snack. The bone broth powder and collagen can be used for soups as well. This can help provide essential amino acids and minerals that heal your gut as well as balance blood sugars. A great option if you have a hard time tolerating solids.     Focus on high quality micro-nutrients.     Bring supplements in to review at follow up.     Increase  physical activity. Moving our body helps move our bowels and speeds up your metabolism.     Exercise 15-60 minutes daily--whether that looks like burst training, yoga, or vigorous walking.    Drink more water. Hydration is critical, so drink at least six to eight glasses of water a day.    Limit alcohol, coffee, and added sugar from processed junk food. Sodas, juices, and diet drinks impact sugar and fat metabolism.    Review the labels watching out for high carb/high sugar foods that may be low in calories but not high in nutrients from healthy fats/protein - focus on trying to consume 6-15g or less or carbohydrates at snacks 2x per day and 15-30g or less of carbohydrates at meals 3x per day.      Watch out for added sugars on the label 0-<5g of sugar per serving is low    Track what you eat. Writing down or tracking through an tate what you eat as well as how you feel acn help you identify patterns in your symptoms. This can help you become more aware and creat a diet that is right for you.     Pick a food tracking tate:     Through the mysymptoms tate, you can track symptoms, bowl movements, medications, stress, exercise, sleep and foods as well as beverages to become more mindful. [email or web address]    Through the [employer] tate, you can focus more on calories and macronutrient's of foods to balance blood sugars or monitor weight. You can track blood sugars in the notes section along with symptoms, bowl movements, medications, stress, exercise, water intake and sleep.   Note: You don't have to journal forever and it is more important that you have consistent meals as well as snacks while focusing on adding in healthy foods.     Rebuild your friendly bacteria in your microbime. Start taking probiotic supplements. They will help rebuild the healthy bacteria so essential to good gut health.     Recommend trying BIOHM health to help break down digestive plaque and get in amylase and variety of good  bacteria.  Take 1 capsule daily anytime with our without food.    Order online - [employer]im [employer] has better price than company.     Manage your stress. Stress can negatively impact the way you digest foods and absorb nutrients leading to more digestive issues (constipation, diarrhea, indigestion, nausea etc), imbalanced blood sugars and weight imbalances. Try to focus on the following relaxation techniques:     Regular exercise such as walking     Yoga    Meditation     Breathing techniques     Time management       NUTRITION RESOURCES:  [employer] Eat fat get think cook book -recipes provided

## 2019-06-23 RX ORDER — LIDOCAINE 40 MG/G
CREAM TOPICAL
Status: CANCELLED | OUTPATIENT
Start: 2019-06-23

## 2019-06-23 RX ORDER — ONDANSETRON 2 MG/ML
4 INJECTION INTRAMUSCULAR; INTRAVENOUS
Status: CANCELLED | OUTPATIENT
Start: 2019-06-23

## 2019-07-26 ENCOUNTER — OFFICE VISIT (OUTPATIENT)
Dept: PEDIATRICS | Facility: CLINIC | Age: 34
End: 2019-07-26
Payer: COMMERCIAL

## 2019-07-26 VITALS
BODY MASS INDEX: 25.39 KG/M2 | SYSTOLIC BLOOD PRESSURE: 104 MMHG | TEMPERATURE: 98.8 F | DIASTOLIC BLOOD PRESSURE: 71 MMHG | WEIGHT: 125.7 LBS | OXYGEN SATURATION: 98 % | HEART RATE: 85 BPM

## 2019-07-26 DIAGNOSIS — H10.11 ALLERGIC CONJUNCTIVITIS, RIGHT: ICD-10-CM

## 2019-07-26 DIAGNOSIS — N92.6 IRREGULAR PERIODS: ICD-10-CM

## 2019-07-26 DIAGNOSIS — R21 MALAR RASH: ICD-10-CM

## 2019-07-26 DIAGNOSIS — L71.9 ACNE ROSACEA: ICD-10-CM

## 2019-07-26 DIAGNOSIS — H02.843 SWOLLEN EYELID, RIGHT: ICD-10-CM

## 2019-07-26 DIAGNOSIS — H57.9 ITCHY EYES: ICD-10-CM

## 2019-07-26 DIAGNOSIS — N92.6 MISSED PERIOD: Primary | ICD-10-CM

## 2019-07-26 LAB — HCG UR QL: NEGATIVE

## 2019-07-26 PROCEDURE — 99214 OFFICE O/P EST MOD 30 MIN: CPT | Performed by: FAMILY MEDICINE

## 2019-07-26 PROCEDURE — 81025 URINE PREGNANCY TEST: CPT | Performed by: FAMILY MEDICINE

## 2019-07-26 RX ORDER — METRONIDAZOLE 7.5 MG/G
GEL TOPICAL 2 TIMES DAILY
Qty: 45 G | Refills: 1 | Status: SHIPPED | OUTPATIENT
Start: 2019-07-26 | End: 2020-01-28

## 2019-07-26 RX ORDER — NORETHINDRONE ACETATE AND ETHINYL ESTRADIOL 1MG-20(21)
1 KIT ORAL DAILY
Qty: 84 TABLET | Refills: 3 | Status: SHIPPED | OUTPATIENT
Start: 2019-07-26 | End: 2019-09-23

## 2019-07-26 RX ORDER — DOXYCYCLINE 100 MG/1
100 TABLET ORAL DAILY
Qty: 30 TABLET | Refills: 1 | Status: SHIPPED | OUTPATIENT
Start: 2019-07-26 | End: 2020-01-09

## 2019-07-26 RX ORDER — OLOPATADINE HYDROCHLORIDE 1 MG/ML
1 SOLUTION/ DROPS OPHTHALMIC 2 TIMES DAILY
Qty: 5 ML | Refills: 1 | Status: SHIPPED | OUTPATIENT
Start: 2019-07-26 | End: 2020-01-13

## 2019-07-26 NOTE — PROGRESS NOTES
Subjective     Jo Ann Patel is a 34 year old female who presents to clinic today for the following health issues:  Patient is here with a use of  through iPad for the office visit  She is here with concerns of having red and itchy right eye and eyelid for the past few days with no concerns for blurred or double vision, left-sided symptoms, previous similar symptoms.  Patient is not sure if she has seasonal allergies in the past  She is also here with reoccurring rash on the cheeks on both sides, has stopped taking MetroGel after feeling better with the previous use  Patient is  2 para 2, vaginal with-2 years ago.  Was getting regular cycles until 6 months ago when she started having cycles every 2 to 3 months, LMP-2 months ago  Patient is not sure if she is pregnant, not using any contraception at this time  Has no urinary frequency, abdominal pain, breast fullness or tenderness, abnormal vaginal discharge or bleeding, nausea, vomiting.  Patient denies history of anemia, thyroid disorder      HPI   Chief Complaint   Patient presents with     Eye Problem     red and itchy x2, woke up with crusty eyes this morning.     Vaginal Bleeding     LMP 19 lasting 10 days, intermittent brown spotting since then. Patient reports negative home upt in .      Derm Problem     follow up to eczema, used metrogel but no change         Patient Active Problem List   Diagnosis     H. pylori infection     Gastroesophageal reflux disease, esophagitis presence not specified     Hepatic steatosis     Non-ulcer dyspepsia     Gallbladder sludge     Itchy eyes     Allergic conjunctivitis, right     Acne rosacea     Past Surgical History:   Procedure Laterality Date     APPENDECTOMY        SECTION N/A 7/3/2017    Procedure:  SECTION;  Primary  Section ;  Surgeon: Luz Maria Kwan MD;  Location: [facility]     HC INSERTION INTRAUTERINE DEVICE       HC REMOVE  INTRAUTERINE DEVICE  2009       Social History     Tobacco Use     Smoking status: Never Smoker     Smokeless tobacco: Never Used   Substance Use Topics     Alcohol use: No     Family History   Problem Relation Age of Onset     Cerebrovascular Disease Maternal Grandfather      Heart Disease Maternal Grandfather      Other - See Comments Daughter         Hydrocephaly         Current Outpatient Medications   Medication Sig Dispense Refill     cyclobenzaprine (FLEXERIL) 5 MG tablet Take 0.5-1 tablets (2.5-5 mg) by mouth nightly as needed for muscle spasms 30 tablet 0     doxycycline monohydrate (ADOXA) 100 MG tablet Take 1 tablet (100 mg) by mouth daily 30 tablet 1     metroNIDAZOLE (METROGEL) 0.75 % external gel Apply topically 2 times daily 45 g 1     norethindrone-ethinyl estradiol (JUNEL FE 1/20) 1-20 MG-MCG tablet Take 1 tablet by mouth daily 84 tablet 3     olopatadine (PATANOL) 0.1 % ophthalmic solution Place 1 drop into the right eye 2 times daily 5 mL 1     omeprazole (PRILOSEC) 20 MG DR capsule Take 1 capsule (20 mg) by mouth daily 60 capsule 5     ranitidine (ZANTAC) 150 MG tablet Take 1 tablet (150 mg) by mouth 2 times daily 60 tablet 5     augmented betamethasone dipropionate (DIPROLENE-AF) 0.05 % cream Apply sparingly to affected area twice daily as needed.  Do not apply to face. (Patient not taking: Reported on 4/30/2019) 50 g 0     medroxyPROGESTERone (DEPO-PROVERA) 150 MG/ML injection Inject 1 mL (150 mg) into the muscle every 3 months (Patient not taking: Reported on 4/30/2019) 3 mL 3     No Known Allergies  Recent Labs   Lab Test 10/31/18  1552 08/27/18  0929 07/04/17  2215   LDL  --  65  --    HDL  --  47*  --    TRIG  --  34  --    ALT 84*  --  12   CR 0.77  --  0.71   GFRESTIMATED 87  --  >90  Non  GFR Calc     GFRESTBLACK >90  --  >90  African American GFR Calc     POTASSIUM 3.6  --  4.2   TSH  --  1.89  --       BP Readings from Last 3 Encounters:   07/26/19 104/71   05/17/19  110/74   04/30/19 98/62    Wt Readings from Last 3 Encounters:   07/26/19 57 kg (125 lb 11.2 oz)   05/17/19 55.7 kg (122 lb 11.2 oz)   04/30/19 57.5 kg (126 lb 11.2 oz)                      Reviewed and updated as needed this visit by Provider         Review of Systems   ROS COMP: CONSTITUTIONAL: NEGATIVE for fever, chills, change in weight  INTEGUMENTARY/SKIN: as above  EYES: as above  ENT/MOUTH: NEGATIVE for ear, mouth and throat problems  RESP: NEGATIVE for significant cough or SOB  CV: NEGATIVE for chest pain, palpitations or peripheral edema  GI: NEGATIVE for nausea, abdominal pain, heartburn, or change in bowel habits  : as above  MUSCULOSKELETAL: NEGATIVE for significant arthralgias or myalgia  NEURO: NEGATIVE for weakness, dizziness or paresthesias  ENDOCRINE: NEGATIVE for temperature intolerance, skin/hair changes  HEME/ALLERGY/IMMUNE: NEGATIVE for bleeding problems  PSYCHIATRIC: NEGATIVE for changes in mood or affect      Objective    /71 (BP Location: Right arm, Patient Position: Sitting, Cuff Size: Adult Regular)   Pulse 85   Temp 98.8  F (37.1  C) (Oral)   Wt 57 kg (125 lb 11.2 oz)   LMP 05/02/2019 (Exact Date)   SpO2 98%   BMI 25.39 kg/m    Body mass index is 25.39 kg/m .  Physical Exam   GENERAL: healthy, alert and no distress  EYES: Left eye-normal exam  Right eye-mild injection of palpebral and bulbar conjunctiva, nonerythematous swollen eyelids on both upper and lower eyelids on the right side  Normal pain-free extraocular movements, normal pupils equally reactive to light and accommodation  HENT: ear canals and TM's normal, nose and mouth without ulcers or lesions  NECK: no adenopathy, no asymmetry, masses, or scars and thyroid normal to palpation  RESP: lungs clear to auscultation - no rales, rhonchi or wheezes  CV: regular rate and rhythm, normal S1 S2, no S3 or S4, no murmur, click or rub, no peripheral edema and peripheral pulses strong  ABDOMEN: soft, nontender, no  hepatosplenomegaly, no masses and bowel sounds normal  MS: no gross musculoskeletal defects noted, no edema  SKIN: Bilateral cheeks-erythematous papular rash  PSYCH: mentation appears normal, affect normal/bright    Diagnostic Test Results:  Labs reviewed in Epic        Assessment & Plan     1. Missed period    - HCG Qual, Urine ([id number])    2. 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   Plan-as mentioned below in problem #5    3. Swollen eyelid, right  as above      4. Irregular periods  Results for orders placed or performed in visit on 07/26/19   HCG Qual, Urine ([id number])   Result Value Ref Range    HCG Qual Urine Negative NEG^Negative         Negative test results reviewed with the patient and reassured.  Recommended to start on Loestrin to regulate the periods for the next 6 months  - norethindrone-ethinyl estradiol (JUNEL FE 1/20) 1-20 MG-MCG tablet; Take 1 tablet by mouth daily  Dispense: 84 tablet; Refill: 3    5. Allergic conjunctivitis, right  Will start on Patanol eyedrops  Follow-up if no better in 2 to 3 weeks or sooner if needed  Dosing and potential medication side effects discussed.  Patient verbalised understanding and is agreeable to the plan.    - olopatadine (PATANOL) 0.1 % ophthalmic solution; Place 1 drop into the right eye 2 times daily  Dispense: 5 mL; Refill: 1    6. Malar rash  Patient was previously evaluated for lupus  Responded well to metronidazole but had stopped using it  We will start back on it along with doxycycline once a day for the next 4 weeks  Patient understands to call to schedule for skin consult if she does not feel any better in 4 to 5 weeks or sooner if needed  Dosing and potential medication side effects discussed.  Patient verbalised understanding and is agreeable to the plan.    - metroNIDAZOLE (METROGEL) 0.75 % external gel; Apply topically 2 times daily  Dispense: 45 g; Refill: 1  - DERMATOLOGY REFERRAL    7. Acne rosacea    as above      - metroNIDAZOLE  "(METROGEL) 0.75 % external gel; Apply topically 2 times daily  Dispense: 45 g; Refill: 1  - doxycycline monohydrate (ADOXA) 100 MG tablet; Take 1 tablet (100 mg) by mouth daily  Dispense: 30 tablet; Refill: 1  - DERMATOLOGY REFERRAL     BMI:   Estimated body mass index is 25.39 kg/m  as calculated from the following:    Height as of 4/3/19: 1.499 m (4' 11\").    Weight as of this encounter: 57 kg (125 lb 11.2 oz).           Chart documentation done in part with Dragon Voice recognition Software. Although reviewed after completion, some word and grammatical error may remain.    See Patient Instructions    No follow-ups on file.    Robson Wilson MD  [facility]      "

## 2019-07-26 NOTE — PATIENT INSTRUCTIONS
Start on doxycycline 100 mg once daily  Start back on MetroGel over the cheeks  Start on Loestrin to regulate the menstrual cycle  Start using Patanol eyedrops    Call to schedule for skin consult if rash is no better in 4 weeks

## 2019-08-08 ENCOUNTER — OFFICE VISIT (OUTPATIENT)
Dept: DERMATOLOGY | Facility: CLINIC | Age: 34
End: 2019-08-08
Attending: FAMILY MEDICINE
Payer: COMMERCIAL

## 2019-08-08 DIAGNOSIS — L71.9 ROSACEA: Primary | ICD-10-CM

## 2019-08-08 PROCEDURE — 99202 OFFICE O/P NEW SF 15 MIN: CPT | Performed by: DERMATOLOGY

## 2019-08-08 ASSESSMENT — PAIN SCALES - GENERAL: PAINLEVEL: NO PAIN (0)

## 2019-08-08 NOTE — LETTER
2019         RE: Jo Ann Patel  [address]  [facility] [place] [facility] [address]        Dear Colleague,    Thank you for referring your patient, Jo Ann Patel, to the [facility]. Please see a copy of my visit note below.    [facility] Dermatology Note      Dermatology Problem List:  1. Rosacea  - Prev response to metronidazole 0.75% gel BID  - Current t/x: Doxy 100 mg, metronidazole 0.75% gel  2. Hx of borderline ABRAHAN    Encounter Date: Aug 8, 2019    CC:  Chief Complaint   Patient presents with     Derm Problem     Has bumps on skin x 1 year referred by Dr. Wilson. Has been using metrogel 0.75% Using baby wash to wash face, and aveeno moisturizer.         History of Present Illness:  Ms. Jo Ann Patel is a 34 year old female who presents as a referral from Dr. Robson Wilson MD for bumps on the face. Today she presents with an . They have been there for about 1 year, and she has been using Metrogel 0.75%. Has also been using baby wash to kia her face and Aveeno moisturizer. She is also on doxy 100 mg. Both medication started only one week ago. She does not so improvement today. She is concerned it is contagious because her mom also has the bumps and she noticed them on her skin after her mom visited. No other concerns addressed today.       Past Medical History:   Patient Active Problem List   Diagnosis     H. pylori infection     Gastroesophageal reflux disease, esophagitis presence not specified     Hepatic steatosis     Non-ulcer dyspepsia     Gallbladder sludge     Itchy eyes     Allergic conjunctivitis, right     Acne rosacea     Past Medical History:   Diagnosis Date     Hepatic steatosis 2019     Wounds and injuries     Fell @ 19 wks, went to [facility], stable     Past Surgical History:   Procedure Laterality Date     APPENDECTOMY        SECTION N/A 7/3/2017    Procedure:  SECTION;  Primary   Section ;  Surgeon: Luz Maria Kwan MD;  [facility]     HC INSERTION INTRAUTERINE DEVICE  2008     HC REMOVE INTRAUTERINE DEVICE  2009       Social History:  Not assessed today.    Family History:  Mom has rosacea.     Medications:  Current Outpatient Medications   Medication Sig Dispense Refill     augmented betamethasone dipropionate (DIPROLENE-AF) 0.05 % cream Apply sparingly to affected area twice daily as needed.  Do not apply to face. 50 g 0     doxycycline monohydrate (ADOXA) 100 MG tablet Take 1 tablet (100 mg) by mouth daily 30 tablet 1     metroNIDAZOLE (METROGEL) 0.75 % external gel Apply topically 2 times daily 45 g 1     olopatadine (PATANOL) 0.1 % ophthalmic solution Place 1 drop into the right eye 2 times daily 5 mL 1     omeprazole (PRILOSEC) 20 MG DR capsule Take 1 capsule (20 mg) by mouth daily 60 capsule 5     ranitidine (ZANTAC) 150 MG tablet Take 1 tablet (150 mg) by mouth 2 times daily 60 tablet 5     cyclobenzaprine (FLEXERIL) 5 MG tablet Take 0.5-1 tablets (2.5-5 mg) by mouth nightly as needed for muscle spasms (Patient not taking: Reported on 8/8/2019) 30 tablet 0     medroxyPROGESTERone (DEPO-PROVERA) 150 MG/ML injection Inject 1 mL (150 mg) into the muscle every 3 months (Patient not taking: Reported on 4/30/2019) 3 mL 3     norethindrone-ethinyl estradiol (JUNEL FE 1/20) 1-20 MG-MCG tablet Take 1 tablet by mouth daily (Patient not taking: Reported on 8/8/2019) 84 tablet 3       No Known Allergies    Review of Systems:  -Constitutional: Patient is otherwise feeling well, in usual state of health.   -Skin: As above in HPI. No additional skin concerns.    Physical exam:  Vitals: There were no vitals taken for this visit.  GEN: This is a well developed, well-nourished female in no acute distress, in a pleasant mood.    SKIN: Sun-exposed skin, which includes the head/face, neck, both arms, digits, and/or nails was examined.   - Canas Type III  - Skin colored papules  scattered on bilateral cheeks   - Scattered fine telangiectasias mostly on the medial cheeks.   - No other lesions of concern on areas examined.       Impression/Plan:    1. Rosacea. We will work on recovering the skin barrier. She has been on metro 0.75% gel and doxy 100 mg daily for about one week. We will continue with this treatment plan with gentle skin care. Empathized the importance of gentle skin care and cream based moisturizer. Discussed the unknown pathogenesis of condition and that it is not contagious. Recommended avoidance of alcohol and spicy foods, as they could flare the condition.     Continue metronidazole 0.75% gel BID, indefinitely    Continue doxy 100 mg daily until prescription is gone. Then discontinue, and see if we can maintain long term on topicals alone    Recommended patient wash face with gentle face wash, use metronidazole 0.75% gel, and a cream based moisturizer, like CeraVe or Vanicream over top.     Provided samples on Vanicream    If it does not improve in 2 months, she will follow-up      CC Robson Wilson MD on close of this encounter.  Follow-up prn.       Staff Involved:  Scribe/Staff    Scribe Disclosure  I, Madina Marroquin, am serving as a scribe to document services personally performed by Dr. Evon Garzon MD, based on data collection and the provider's statements to me.     Provider Disclosure:   The documentation recorded by the scribe accurately reflects the services I personally performed and the decisions made by me.    Evon Garzon MD    Department of Dermatology  [facility]: Phone: [phone], Fax:[phone]  [facility]: Phone: [phone], Fax: [phone]                Again, thank you for allowing me to participate in the care of your patient.        Sincerely,        Evon Garzon MD

## 2019-08-08 NOTE — PROGRESS NOTES
[facility] Dermatology Note      Dermatology Problem List:  1. Rosacea  - Prev response to metronidazole 0.75% gel BID  - Current t/x: Doxy 100 mg, metronidazole 0.75% gel  2. Hx of borderline ABRAHAN    Encounter Date: Aug 8, 2019    CC:  Chief Complaint   Patient presents with     Derm Problem     Has bumps on skin x 1 year referred by Dr. Wilson. Has been using metrogel 0.75% Using baby wash to wash face, and aveeno moisturizer.         History of Present Illness:  Ms. Jo Ann Patel is a 34 year old female who presents as a referral from Dr. Robson Wilson MD for bumps on the face. Today she presents with an . They have been there for about 1 year, and she has been using Metrogel 0.75%. Has also been using baby wash to kia her face and Aveeno moisturizer. She is also on doxy 100 mg. Both medication started only one week ago. She does not so improvement today. She is concerned it is contagious because her mom also has the bumps and she noticed them on her skin after her mom visited. No other concerns addressed today.       Past Medical History:   Patient Active Problem List   Diagnosis     H. pylori infection     Gastroesophageal reflux disease, esophagitis presence not specified     Hepatic steatosis     Non-ulcer dyspepsia     Gallbladder sludge     Itchy eyes     Allergic conjunctivitis, right     Acne rosacea     Past Medical History:   Diagnosis Date     Hepatic steatosis 2019     Wounds and injuries     Fell @ 19 wks, went to [facility], stable     Past Surgical History:   Procedure Laterality Date     APPENDECTOMY        SECTION N/A 7/3/2017    Procedure:  SECTION;  Primary  Section ;  Surgeon: Luz Maria Kwan MD;  [facility]     HC INSERTION INTRAUTERINE DEVICE       HC REMOVE INTRAUTERINE DEVICE         Social History:  Not assessed today.    Family History:  Mom has rosacea.     Medications:  Current Outpatient  Medications   Medication Sig Dispense Refill     augmented betamethasone dipropionate (DIPROLENE-AF) 0.05 % cream Apply sparingly to affected area twice daily as needed.  Do not apply to face. 50 g 0     doxycycline monohydrate (ADOXA) 100 MG tablet Take 1 tablet (100 mg) by mouth daily 30 tablet 1     metroNIDAZOLE (METROGEL) 0.75 % external gel Apply topically 2 times daily 45 g 1     olopatadine (PATANOL) 0.1 % ophthalmic solution Place 1 drop into the right eye 2 times daily 5 mL 1     omeprazole (PRILOSEC) 20 MG DR capsule Take 1 capsule (20 mg) by mouth daily 60 capsule 5     ranitidine (ZANTAC) 150 MG tablet Take 1 tablet (150 mg) by mouth 2 times daily 60 tablet 5     cyclobenzaprine (FLEXERIL) 5 MG tablet Take 0.5-1 tablets (2.5-5 mg) by mouth nightly as needed for muscle spasms (Patient not taking: Reported on 8/8/2019) 30 tablet 0     medroxyPROGESTERone (DEPO-PROVERA) 150 MG/ML injection Inject 1 mL (150 mg) into the muscle every 3 months (Patient not taking: Reported on 4/30/2019) 3 mL 3     norethindrone-ethinyl estradiol (JUNEL FE 1/20) 1-20 MG-MCG tablet Take 1 tablet by mouth daily (Patient not taking: Reported on 8/8/2019) 84 tablet 3       No Known Allergies    Review of Systems:  -Constitutional: Patient is otherwise feeling well, in usual state of health.   -Skin: As above in HPI. No additional skin concerns.    Physical exam:  Vitals: There were no vitals taken for this visit.  GEN: This is a well developed, well-nourished female in no acute distress, in a pleasant mood.    SKIN: Sun-exposed skin, which includes the head/face, neck, both arms, digits, and/or nails was examined.   - Canas Type III  - Skin colored papules scattered on bilateral cheeks   - Scattered fine telangiectasias mostly on the medial cheeks.   - No other lesions of concern on areas examined.       Impression/Plan:    1. Rosacea. We will work on recovering the skin barrier. She has been on metro 0.75% gel and doxy 100  mg daily for about one week. We will continue with this treatment plan with gentle skin care. Empathized the importance of gentle skin care and cream based moisturizer. Discussed the unknown pathogenesis of condition and that it is not contagious. Recommended avoidance of alcohol and spicy foods, as they could flare the condition.     Continue metronidazole 0.75% gel BID, indefinitely    Continue doxy 100 mg daily until prescription is gone. Then discontinue, and see if we can maintain long term on topicals alone    Recommended patient wash face with gentle face wash, use metronidazole 0.75% gel, and a cream based moisturizer, like CeraVe or Vanicream over top.     Provided samples on Vanicream    If it does not improve in 2 months, she will follow-up      CC Robson Wilson MD on close of this encounter.  Follow-up prn.       Staff Involved:  Scribe/Staff    Scribe Disclosure  I, Madina Marroquin, am serving as a scribe to document services personally performed by Dr. Evon Garzon MD, based on data collection and the provider's statements to me.     Provider Disclosure:   The documentation recorded by the scribe accurately reflects the services I personally performed and the decisions made by me.    Evon Garzon MD    Department of Dermatology  [facility]: Phone: [phone], Fax:[phone]  [facility]: Phone: [phone], Fax: [phone]

## 2019-08-08 NOTE — NURSING NOTE
Jo Ann Patel's goals for this visit include:   Chief Complaint   Patient presents with     Derm Problem     Has bumps on skin x 1 year referred by Dr. Wilson. Has been using metrogel 0.75% Using baby wash to wash face, and aveeno moisturizer.       She requests these members of her care team be copied on today's visit information: Yes    PCP: Robson Wilson    Referring Provider:  Robson Wilson MD  [address] [place] [address]    There were no vitals taken for this visit.    Do you need any medication refills at today's visit? No    Richelle Soliman LPN

## 2019-08-08 NOTE — PATIENT INSTRUCTIONS
Wash face with gentle face wash, use metronidazole 0.75% gel, and then a cream based moisturizer, like CeraVe or Vanicream over top.     Gentle Skin Care  Below is a list of products our providers recommend for gentle skin care.  Moisturizers:    Lighter; Cetaphil Cream, CeraVe, Aveeno and Vanicream Light     Thicker; Aquaphor Ointment, Vaseline, Petrolium Jelly, Eucerin and Vanicream    Avoid Lotions (too thin)  Mild Cleansers:    Dove- Fragrance Free    CeraVe     Vanicream Cleansing Bar    Cetaphil Cleanser     Aquaphor 2 in1 Gentle Wash and Shampoo       Laundry Products:    All Free and Clear    Cheer Free    Generic Brands are okay as long as they are  Fragrance Free      Avoid fabric softeners  and dryer sheets   Sunscreens: SPF 30 or greater     Sunscreens that contain Zinc Oxide or Titanium Dioxide should be applied, these are physical blockers. Spray or  chemical  sunscreens should be avoided.        Shampoo and Conditioners:    Free and Clear by Vanicream    Aquaphor 2 in 1 Gentle Wash and Shampoo Oils:    Mineral Oil     Emu Oil     For some patients, coconut and sunflower seed oil      Generic Products are an okay substitute, but make sure they are fragrance free.  *Avoid product that have fragrance added to them. Organic does not mean  fragrance free.  In fact patients with sensitive skin can become quite irritated by organic products.     1. Daily bathing is recommended. Make sure you are applying a good moisturizer after bathing every time.  2. Use Moisturizing creams at least twice daily to the whole body.   3. Creams are more moisturizing than lotions  4. Products should be fragrance free- soaps, creams, detergents.   Care Plan:  1. Keep bathing and showering short, less than 15 minutes   2. Always use lukewarm warm when possible. AVOID very HOT or COLD water  3. DO NOT use bubble bath  4. Limit the use of soaps. Focus on the skin folds, face, armpits, groin and feet  5. Do NOT vigorously scrub  when you cleanse your skin  6. After bathing, PAT your skin lightly with a towel. DO NOT rub or scrub when drying  7. ALWAYS apply a moisturizer immediately after bathing. This helps to  lock in  the moisture.  8. Reapply moisturizing agents at least twice daily to your whole body  9. Do not use products such as powders, perfumes, or colognes on your skin  10. Avoid saunas and steam baths. This temperature is too HOT  11. Avoid tight or  scratchy  clothing such as wool  12. Always wash new clothing before wearing them for the first time  13. Sometimes a humidifier or vaporizer can be used at night can help the dry skin. Remember to keep it clean to avoid mold growth.

## 2019-09-23 ENCOUNTER — TELEPHONE (OUTPATIENT)
Dept: PEDIATRICS | Facility: CLINIC | Age: 34
End: 2019-09-23

## 2019-09-23 ENCOUNTER — OFFICE VISIT (OUTPATIENT)
Dept: PEDIATRICS | Facility: CLINIC | Age: 34
End: 2019-09-23
Payer: COMMERCIAL

## 2019-09-23 VITALS
HEART RATE: 92 BPM | BODY MASS INDEX: 25.11 KG/M2 | OXYGEN SATURATION: 98 % | WEIGHT: 124.3 LBS | DIASTOLIC BLOOD PRESSURE: 70 MMHG | TEMPERATURE: 98.7 F | SYSTOLIC BLOOD PRESSURE: 110 MMHG

## 2019-09-23 DIAGNOSIS — Z32.01 PREGNANCY TEST POSITIVE: ICD-10-CM

## 2019-09-23 DIAGNOSIS — J98.9 REACTIVE AIRWAY DISEASE THAT IS NOT ASTHMA: Primary | ICD-10-CM

## 2019-09-23 LAB — HCG UR QL: POSITIVE

## 2019-09-23 PROCEDURE — 94640 AIRWAY INHALATION TREATMENT: CPT | Performed by: FAMILY MEDICINE

## 2019-09-23 PROCEDURE — 81025 URINE PREGNANCY TEST: CPT | Performed by: FAMILY MEDICINE

## 2019-09-23 PROCEDURE — 99213 OFFICE O/P EST LOW 20 MIN: CPT | Mod: 25 | Performed by: FAMILY MEDICINE

## 2019-09-23 RX ORDER — ALBUTEROL SULFATE 90 UG/1
1-2 AEROSOL, METERED RESPIRATORY (INHALATION) EVERY 4 HOURS PRN
Qty: 1 INHALER | Refills: 0 | Status: SHIPPED | OUTPATIENT
Start: 2019-09-23 | End: 2019-09-25

## 2019-09-23 RX ORDER — ALBUTEROL SULFATE 0.83 MG/ML
2.5 SOLUTION RESPIRATORY (INHALATION) ONCE
Status: COMPLETED | OUTPATIENT
Start: 2019-09-23 | End: 2019-09-23

## 2019-09-23 RX ORDER — CETIRIZINE HYDROCHLORIDE 10 MG/1
10 TABLET ORAL DAILY PRN
Qty: 1 TABLET | Refills: 0 | Status: SHIPPED | OUTPATIENT
Start: 2019-09-23 | End: 2020-01-13

## 2019-09-23 RX ADMIN — ALBUTEROL SULFATE 2.5 MG: 0.83 SOLUTION RESPIRATORY (INHALATION) at 14:55

## 2019-09-23 NOTE — PROGRESS NOTES
Subjective     Jo Ann Patel is a 34 year old female who presents to clinic today for the following health issues:    HPI   Patient with a history of allergic rhinitis presenting with concerns for exposure to mold which has caused coughing spells, rhinorrhea and watery eyes. She has reached out to her land lord and will be contacting the health department as well. She denies fever, chills, productive cough, night sweats. Symptoms have been for a duration of 1 week.    She also thinks she might be pregnant as she's missed her period and is not on birth control at this time.        Reviewed and updated as needed this visit by Provider         Review of Systems   ROS COMP: Constitutional, HEENT, cardiovascular, pulmonary, gi and gu systems are negative, except as otherwise noted.      Objective    /70   Pulse 92   Temp 98.7  F (37.1  C) (Oral)   Wt 56.4 kg (124 lb 4.8 oz)   LMP 08/28/2019   SpO2 98%   BMI 25.11 kg/m    Body mass index is 25.11 kg/m .  Physical Exam   GENERAL: healthy, alert and no distress  EYES: Eyes grossly normal to inspection, PERRL and conjunctivae and sclerae normal  HENT: ear canals and TM's normal, nose and mouth without ulcers or lesions  NECK: no adenopathy, no asymmetry, masses, or scars and thyroid normal to palpation  RESP: expiratory wheezes bibasilar  CV: regular rate and rhythm, normal S1 S2, no S3 or S4, no murmur, click or rub, no peripheral edema and peripheral pulses strong  ABDOMEN: soft, nontender, no hepatosplenomegaly, no masses and bowel sounds normal  MS: no gross musculoskeletal defects noted, no edema        Results for orders placed or performed in visit on 09/23/19   HCG Qual, Urine ([id number])   Result Value Ref Range    HCG Qual Urine Positive (A) NEG^Negative         Assessment & Plan     1. Reactive airway disease that is not asthma  - albuterol (PROVENTIL) neb solution 2.5 mg  - albuterol (PROAIR HFA/PROVENTIL HFA/VENTOLIN HFA) 108 (90 Base)  MCG/ACT inhaler; Inhale 1-2 puffs into the lungs every 4 hours as needed for shortness of breath / dyspnea or wheezing  Dispense: 1 Inhaler; Refill: 0  - cetirizine (ZYRTEC) 10 MG tablet; Take 1 tablet (10 mg) by mouth daily as needed for allergies  Dispense: 1 tablet; Refill: 0    2. Pregnancy test positive  - OB/GYN REFERRAL         Return in about 2 weeks (around 10/7/2019) for recheck.    José Antonio Molina MD  [facility], [place]

## 2019-09-23 NOTE — TELEPHONE ENCOUNTER
Patient called back, relayed message and patient verbalized understanding. She was wondering if her new meds are safe for her to take?  Called patient back with an , Zyrtec is category B, so she will wait until we hear back from a provider. Prilosec is C.    Transferred to OB scheduling.    Jes Donahue RN

## 2019-09-23 NOTE — NURSING NOTE
Clinic Administered Medication Documentation    MEDICATION LIST: Inhalable/Nebs Medication Documentation    Patient was given Albuterol Sulfate Neb. Prior to medication administration, verified patients identity using patient s name and date of birth. Please see MAR and medication order for additional information.

## 2019-09-23 NOTE — TELEPHONE ENCOUNTER
Please let patient know her pregnancy test came back positive. I already put in a referral for OB.

## 2019-09-23 NOTE — PATIENT INSTRUCTIONS
Patient Education     Broncoespasmo (Adulto)    El broncoespasmo se produce cuando las vías aéreas (los tubos bronquiales) se contraen espasmódicamente. Eso dificulta la respiración y provoca silbidos (un walker parecido a un soplido mariama). El broncoespasmo también puede causar tos frecuente sin silbidos.  Esta enfermedad se debe a la irritación o inflamación de las vías respiratorias o a ange reacción alérgica que las afecta. La gente con asma suele tener broncoespasmos. No obstante, no todas las personas que tienen broncoespasmos tienen asma.  Exponerse a gases peligrosos, jorge tenido bronquitis hace poco, o la intensificación de un enfisema crónico (enfermedad pulmonar obstructiva crónica, EPOC) pueden hacer que las vías respiratorias se contraigan espasmódicamente. Un episodio de broncoespasmo puede durar entre 7 y 14 días. Es posible que le receten medicamentos para relajar las vías respiratorias y evitar la sibilancia. El proveedor de atención médica solo le recetará antibióticos si paul que tiene ange infección bacteriana. Los antibióticos no sirven para tratar ange infección viral.  Cuidados en el hogar    Beverly gran cantidad de agua y otros líquidos (al menos, 10 vasos al día) cuando tenga un ataque. Ello aflojará las secreciones de los pulmones, con lo que le resultará más fácil respirar. Si tiene alguna enfermedad cardíaca o renal, consulte a schafer proveedor de atención médica antes de beber cantidades adicionales de líquidos.    [place] el medicamento que le recetaron exactamente en los momentos que le indicaron. Si tiene un inhalador de mano para ayudarle a respirar, no lo use más de ange vez cada cuatro horas, a menos que le indiquen otra cosa. Si le recetaron un antibiótico o prednisona, tome todo el medicamento aunque se sienta mejor después de algunos días.    No fume. No se exponga al humo de los demás.    Si le dieron un inhalador, úselo exactamente andie le indicaron. Si necesita usarlo con mayor  frecuencia que lo recetado, es posible que schafer afección esté empeorando. En medardo miya, comuníquese con schafer proveedor de atención médica.  Atención de seguimiento  Realice el seguimiento con schafer proveedor de atención médica o según lo que le indiquen.  Si tiene 65 años o más, tiene ange enfermedad o afección crónica del pulmón que afecta schafer sistema inmune o fuma, consulte con schafer proveedor de atención médica sobre cómo obtener ange vacuna antineumocócica, así andie ange vacuna anual contra la gripe (vacuna contra la influenza).  Cuándo buscar atención médica  Llame a schafer proveedor de atención médica si ocurre algo de lo siguiente:    Necesita usar los inhaladores con mayor frecuencia que lo normal.    Tiene fiebre de 100.4  F (38  C) o más josé antonio, o andie le haya indicado schafer proveedor de atención médica.    Tiene tos con expulsión de mucho esputo (mucosidad) de color oscuro o con campos.    No empieza a sentirse mejor dentro de las próximas 24 horas.  Cuándo llamar al 911  Llame al 911 si presenta cualquiera de estos síntomas:    Tos con esputo con campos (mucosidad)    Dolor dl pecho con cada respiración    Más silbidos o falta de aire  Date Last Reviewed: 9/13/2015 2000-2018 [employer]. [address] [place] [address], [place] [address]. Todos los derechos reservados. Esta información no pretende sustituir la atención médica profesional. Sólo schafer médico puede diagnosticar y tratar un problema de jeannette.

## 2019-09-24 NOTE — TELEPHONE ENCOUNTER
Patient notified. No further questions at this time.     Quiana Mckenzie, RN, BSN, PHN  [facility]

## 2019-09-25 ENCOUNTER — TELEPHONE (OUTPATIENT)
Dept: PEDIATRICS | Facility: CLINIC | Age: 34
End: 2019-09-25

## 2019-09-25 DIAGNOSIS — J98.9 REACTIVE AIRWAY DISEASE THAT IS NOT ASTHMA: ICD-10-CM

## 2019-09-25 DIAGNOSIS — Z77.120 EXPOSURE TO MOLD: Primary | ICD-10-CM

## 2019-09-25 RX ORDER — ALBUTEROL SULFATE 90 UG/1
1-2 AEROSOL, METERED RESPIRATORY (INHALATION) EVERY 4 HOURS PRN
Qty: 1 INHALER | Refills: 0 | Status: SHIPPED | OUTPATIENT
Start: 2019-09-25 | End: 2020-01-13

## 2019-09-25 RX ORDER — ALBUTEROL SULFATE 90 UG/1
1-2 AEROSOL, METERED RESPIRATORY (INHALATION) EVERY 4 HOURS PRN
Qty: 1 INHALER | Refills: 0 | Status: SHIPPED | OUTPATIENT
Start: 2019-09-25 | End: 2019-09-25

## 2019-09-25 NOTE — TELEPHONE ENCOUNTER
[facility] Call Center    Phone Message    May a detailed message be left on voicemail: yes    Reason for Call: Patient was advised by the pharmacy that they did not receive the prescription for albuterol (PROAIR HFA/PROVENTIL HFA/VENTOLIN HFA) 108 (90 Base) MCG/ACT inhaler.  Patient is also wanting testing for mold exposure. Please advise.     Action Taken: Message routed to:  Primary Care p [phone]

## 2019-09-26 NOTE — TELEPHONE ENCOUNTER
Called patient, relayed message & patient verbalized understanding. Transferred to scheduling.   Jes Donahue RN

## 2019-09-30 ENCOUNTER — TELEPHONE (OUTPATIENT)
Dept: PEDIATRICS | Facility: CLINIC | Age: 34
End: 2019-09-30

## 2019-09-30 NOTE — TELEPHONE ENCOUNTER
PA request from pharm. Albuterol HFA (200 puffs) 8.5gm. inhale 1-2 puffs into the lungs q4h prn for SOB. Call plan at [phone] pt id # [phone]. Message routed to PA team. Nayeli Worthington LPN

## 2019-09-30 NOTE — TELEPHONE ENCOUNTER
Prior Authorization Not Needed per Insurance    Medication: albuterol (PROAIR HFA/PROVENTIL HFA/VENTOLIN HFA) 108 (90 Base) MCG/ACT inhaler-PA NOT NEEDED   Insurance Company: [employer] - Phone [phone] Fax [phone]  Expected CoPay: $3    Pharmacy Filling the Rx: [id number] - [facility] [address] AT [facility]  Pharmacy Notified: Yes  Patient Notified: No    Called pharmacy and pharmacy stated that PA is Not Needed and medication is covered. Pharmacy stated that they have a paid claim on medication Ventolin HFA quantity 1 inhaler for 16 day supply.

## 2019-10-07 LAB — LAB SCANNED RESULT: NORMAL

## 2019-10-12 ENCOUNTER — OFFICE VISIT (OUTPATIENT)
Dept: PEDIATRICS | Facility: CLINIC | Age: 34
End: 2019-10-12
Payer: COMMERCIAL

## 2019-10-12 VITALS
DIASTOLIC BLOOD PRESSURE: 69 MMHG | TEMPERATURE: 98 F | OXYGEN SATURATION: 100 % | WEIGHT: 126 LBS | BODY MASS INDEX: 25.45 KG/M2 | SYSTOLIC BLOOD PRESSURE: 106 MMHG | HEART RATE: 78 BPM

## 2019-10-12 DIAGNOSIS — Z33.1 PREGNANT STATE, INCIDENTAL: ICD-10-CM

## 2019-10-12 DIAGNOSIS — H66.002 NON-RECURRENT ACUTE SUPPURATIVE OTITIS MEDIA OF LEFT EAR WITHOUT SPONTANEOUS RUPTURE OF TYMPANIC MEMBRANE: Primary | ICD-10-CM

## 2019-10-12 PROCEDURE — 99214 OFFICE O/P EST MOD 30 MIN: CPT | Performed by: FAMILY MEDICINE

## 2019-10-12 RX ORDER — AMOXICILLIN 500 MG/1
CAPSULE ORAL
Refills: 0 | COMMUNITY
Start: 2019-10-05 | End: 2019-11-15

## 2019-10-12 NOTE — PATIENT INSTRUCTIONS
Finish amoxicillin for 7 days  Take albuterol inhaler as needed  Schedule for OB US in 1 week  Start on prenatal vitamins daily

## 2019-10-12 NOTE — PROGRESS NOTES
SUBJECTIVE:                                                      Patient presents for Hospital Followup Visit:    Hospital:  [facility]      Date: 10-5-19    Reason(s) for Admission: Ear infection    Patient with recent history of incidental pregnancy is here for follow-up on her recent ER visit for acute left ear pain, was diagnosed with left otitis media, was started on amoxicillin thousand milligrams twice a day for 7 days.  Patient is to finish 1 more day of antibiotic  Currently she denies concerns for left ear ringing, fullness, pain  Her cough also improved, denies wheezing.  She was surprised that she received a diagnosis of possible asthma at her last visit in the clinic  She is scheduled to see allergist next week  Patient is also concerned about her current pregnancy which she was not expecting.  Patient was seen on July 26 for MrSohail  Since she was tested negative for pregnancy  At her visit in September, she was tested positive  Has no urinary frequency, abdominal pain, breast fullness or tenderness, abnormal vaginal discharge or bleeding, nausea, vomiting.  Patient is not sure of the exact date of LMP though she thinks it could be August 28         Problems taking medications regularly:  None       Medication changes since discharge: None       Problems adhering to non-medication therapy:  None    Summary of hospitalization:  CareEverywhere information obtained and reviewed  Diagnostic Tests/Treatments reviewed.  Follow up needed: none  Other Healthcare Providers Involved in Patient s Care:         None  Update since discharge: improved.     ROS:  CONSTITUTIONAL:NEGATIVE for fever, chills, change in weight  INTEGUMENTARY/SKIN: NEGATIVE for worrisome rashes, moles or lesions  EYES: NEGATIVE for vision changes or irritation  ENT/MOUTH: as above  RESP:as above  CV: NEGATIVE for chest pain, palpitations or peripheral edema  GI: NEGATIVE for nausea, abdominal pain, heartburn, or change in bowel habits  : as  above  MUSCULOSKELETAL: NEGATIVE for significant arthralgias or myalgia  PSYCHIATRIC: NEGATIVE for changes in mood or affect    OBJECTIVE:                                                    General-healthy, active, not in distress  EYES: Eyes grossly normal to inspection  HENT: ear canals and TM's normal and nose and mouth without ulcers or lesions  NECK: no adenopathy, no asymmetry, masses, or scars and thyroid normal to palpation  RESP: lungs clear to auscultation - no rales, rhonchi or wheezes  CV: regular rates and rhythm, normal S1 S2, no S3 or S4 and no murmur, click or rub  ABDOMEN: soft, nontender, without hepatosplenomegaly or masses and bowel sounds normal  MS: extremities normal- no gross deformities noted  SKIN: no suspicious lesions or rashes  PSYCH: mentation appears normal and affect normal/bright    ASSESSMENT/PLAN:                                                      ASSESSMENT / PLAN:  (H66.002) Non-recurrent acute suppurative otitis media of left ear without spontaneous rupture of tympanic membrane  (primary encounter diagnosis)  Comment:   Plan: Recommended to finish the 7-day course of amoxicillin, continue to monitor  Avoid potential exposure to allergens including mold at home  Continue to follow-up with allergist as scheduled next week    (Z33.1) Pregnant state, incidental  Comment:   Plan: US OB < 14 Weeks Single        Due to patient's confusion about the LMP, recommended to get OB ultrasound for further evaluation of the gestation  Will f/u on results and call with recommendations.  Patient verbalised understanding and is agreeable to the plan.  recommended to start on prenatal vitamins which she has not done yet      Chart documentation done in part with Dragon Voice recognition Software. Although reviewed after completion, some word and grammatical error may remain.

## 2019-10-14 ENCOUNTER — ANCILLARY PROCEDURE (OUTPATIENT)
Dept: ULTRASOUND IMAGING | Facility: CLINIC | Age: 34
End: 2019-10-14
Attending: FAMILY MEDICINE
Payer: COMMERCIAL

## 2019-10-14 DIAGNOSIS — Z33.1 PREGNANT STATE, INCIDENTAL: ICD-10-CM

## 2019-10-14 PROCEDURE — 76817 TRANSVAGINAL US OBSTETRIC: CPT | Performed by: RADIOLOGY

## 2019-10-14 PROCEDURE — 76801 OB US < 14 WKS SINGLE FETUS: CPT | Performed by: RADIOLOGY

## 2019-10-14 NOTE — RESULT ENCOUNTER NOTE
Please inform patient of reassuring ultrasound showing 7 weeks of live gestation corresponding with the last menstrual.  In September

## 2019-10-14 NOTE — RESULT ENCOUNTER NOTE
Please disregard the previous message-    Please inform patient of reassuring ultrasound showing 7 weeks of live gestation corresponding with the LMP in August

## 2019-10-18 ENCOUNTER — OFFICE VISIT (OUTPATIENT)
Dept: OBGYN | Facility: CLINIC | Age: 34
End: 2019-10-18
Payer: COMMERCIAL

## 2019-10-18 VITALS
WEIGHT: 128 LBS | BODY MASS INDEX: 23.55 KG/M2 | HEART RATE: 74 BPM | SYSTOLIC BLOOD PRESSURE: 92 MMHG | DIASTOLIC BLOOD PRESSURE: 58 MMHG | HEIGHT: 62 IN

## 2019-10-18 DIAGNOSIS — O09.529 SUPERVISION OF HIGH-RISK PREGNANCY OF ELDERLY MULTIGRAVIDA: ICD-10-CM

## 2019-10-18 DIAGNOSIS — Z32.00 PREGNANCY EXAMINATION OR TEST, PREGNANCY UNCONFIRMED: Primary | ICD-10-CM

## 2019-10-18 LAB — HCG UR QL: POSITIVE

## 2019-10-18 PROCEDURE — 99213 OFFICE O/P EST LOW 20 MIN: CPT | Performed by: OBSTETRICS & GYNECOLOGY

## 2019-10-18 PROCEDURE — 81025 URINE PREGNANCY TEST: CPT | Performed by: OBSTETRICS & GYNECOLOGY

## 2019-10-18 ASSESSMENT — MIFFLIN-ST. JEOR: SCORE: 1233.85

## 2019-10-18 NOTE — PROGRESS NOTES
RN faxed completed MFM referral with current pregnancy episode and last ultra sound result. RN copied referral and placed in STAT scanning.     Racquel Corbin RN on 10/18/2019 at 3:50 PM

## 2019-10-18 NOTE — PROGRESS NOTES
This 35 y/o female, , LMP 19, who presents for pregnancy confirmation.  She has already had an early OB US per Dr. Wilson which provides excellent dates.  The patient is taking a PNV daily po and her social hx is negative.  She denies any nausea or emesis issues.  Due to advanced maternal age (will be 36 y/o at the time of delivery) coupled with her hx of a child with hydrocephaly, will refer her to [facility] for genetic counseling.  The patient is comfortable with this plan and her Maldivian interpretor was present throughout today's visit.  This was a 20-minute visit and over 50% of the time was spent in direct patient consultation.

## 2019-11-15 ENCOUNTER — PRENATAL OFFICE VISIT (OUTPATIENT)
Dept: OBGYN | Facility: CLINIC | Age: 34
End: 2019-11-15
Payer: COMMERCIAL

## 2019-11-15 ENCOUNTER — ANCILLARY PROCEDURE (OUTPATIENT)
Dept: ULTRASOUND IMAGING | Facility: CLINIC | Age: 34
End: 2019-11-15
Attending: OBSTETRICS & GYNECOLOGY
Payer: COMMERCIAL

## 2019-11-15 VITALS
WEIGHT: 127 LBS | OXYGEN SATURATION: 98 % | HEIGHT: 62 IN | SYSTOLIC BLOOD PRESSURE: 105 MMHG | BODY MASS INDEX: 23.37 KG/M2 | DIASTOLIC BLOOD PRESSURE: 59 MMHG | HEART RATE: 84 BPM

## 2019-11-15 DIAGNOSIS — O09.891 SUPERVISION OF OTHER HIGH RISK PREGNANCIES, FIRST TRIMESTER: ICD-10-CM

## 2019-11-15 LAB
ALBUMIN UR-MCNC: NEGATIVE MG/DL
APPEARANCE UR: CLEAR
BILIRUB UR QL STRIP: NEGATIVE
COLOR UR AUTO: NORMAL
ERYTHROCYTE [DISTWIDTH] IN BLOOD BY AUTOMATED COUNT: 12.1 % (ref 10–15)
GLUCOSE UR STRIP-MCNC: NEGATIVE MG/DL
HCT VFR BLD AUTO: 35.4 % (ref 35–47)
HGB BLD-MCNC: 12.3 G/DL (ref 11.7–15.7)
HGB UR QL STRIP: NEGATIVE
KETONES UR STRIP-MCNC: NEGATIVE MG/DL
LEUKOCYTE ESTERASE UR QL STRIP: NEGATIVE
MCH RBC QN AUTO: 31.8 PG (ref 26.5–33)
MCHC RBC AUTO-ENTMCNC: 34.7 G/DL (ref 31.5–36.5)
MCV RBC AUTO: 92 FL (ref 78–100)
NITRATE UR QL: NEGATIVE
PH UR STRIP: 5.5 PH (ref 5–7)
PLATELET # BLD AUTO: 318 10E9/L (ref 150–450)
RBC # BLD AUTO: 3.87 10E12/L (ref 3.8–5.2)
SOURCE: NORMAL
SP GR UR STRIP: 1 (ref 1–1.03)
UROBILINOGEN UR STRIP-MCNC: NORMAL MG/DL (ref 0–2)
WBC # BLD AUTO: 10.9 10E9/L (ref 4–11)

## 2019-11-15 PROCEDURE — 86762 RUBELLA ANTIBODY: CPT | Performed by: OBSTETRICS & GYNECOLOGY

## 2019-11-15 PROCEDURE — 86901 BLOOD TYPING SEROLOGIC RH(D): CPT | Performed by: OBSTETRICS & GYNECOLOGY

## 2019-11-15 PROCEDURE — 99207 ZZC FIRST OB VISIT: CPT | Performed by: OBSTETRICS & GYNECOLOGY

## 2019-11-15 PROCEDURE — 86900 BLOOD TYPING SEROLOGIC ABO: CPT | Performed by: OBSTETRICS & GYNECOLOGY

## 2019-11-15 PROCEDURE — 76801 OB US < 14 WKS SINGLE FETUS: CPT | Performed by: STUDENT IN AN ORGANIZED HEALTH CARE EDUCATION/TRAINING PROGRAM

## 2019-11-15 PROCEDURE — G0499 HEPB SCREEN HIGH RISK INDIV: HCPCS | Performed by: OBSTETRICS & GYNECOLOGY

## 2019-11-15 PROCEDURE — 87591 N.GONORRHOEAE DNA AMP PROB: CPT | Performed by: OBSTETRICS & GYNECOLOGY

## 2019-11-15 PROCEDURE — 87491 CHLMYD TRACH DNA AMP PROBE: CPT | Performed by: OBSTETRICS & GYNECOLOGY

## 2019-11-15 PROCEDURE — 86780 TREPONEMA PALLIDUM: CPT | Performed by: OBSTETRICS & GYNECOLOGY

## 2019-11-15 PROCEDURE — 85027 COMPLETE CBC AUTOMATED: CPT | Performed by: OBSTETRICS & GYNECOLOGY

## 2019-11-15 PROCEDURE — 36415 COLL VENOUS BLD VENIPUNCTURE: CPT | Performed by: OBSTETRICS & GYNECOLOGY

## 2019-11-15 PROCEDURE — 87389 HIV-1 AG W/HIV-1&-2 AB AG IA: CPT | Performed by: OBSTETRICS & GYNECOLOGY

## 2019-11-15 PROCEDURE — 81003 URINALYSIS AUTO W/O SCOPE: CPT | Performed by: OBSTETRICS & GYNECOLOGY

## 2019-11-15 PROCEDURE — 86850 RBC ANTIBODY SCREEN: CPT | Performed by: OBSTETRICS & GYNECOLOGY

## 2019-11-15 ASSESSMENT — MIFFLIN-ST. JEOR: SCORE: 1229.32

## 2019-11-15 NOTE — PROGRESS NOTES
"Jo Ann is a 34 year old female, , who is here today for her first OB visit at 11 2/7 weeks gestation.  She has had a positive home pregnancy test.  She c/o a decreased appetite but denies any nausea or emesis.  She has not felt fetal movement yet but denies any fluid leakage or uterine contractions.     ROS: Ten point review of systems was reviewed and negative except the above.    Gyn Hx:      Past Medical History:   Diagnosis Date     Hepatic steatosis 2019     Past Surgical History:   Procedure Laterality Date     APPENDECTOMY        SECTION N/A 7/3/2017    Procedure:  SECTION;  Primary  Section ;  Surgeon: Luz Maria Kwan MD;  Location: [facility] L+[facility]     HC INSERTION INTRAUTERINE DEVICE       HC REMOVE INTRAUTERINE DEVICE       Patient Active Problem List   Diagnosis     H. pylori infection     Gastroesophageal reflux disease, esophagitis presence not specified     Hepatic steatosis     Non-ulcer dyspepsia     Gallbladder sludge     Itchy eyes     Allergic conjunctivitis, right     Acne rosacea       ALL/Meds: Her medication and allergy histories were reviewed and are documented in their appropriate chart areas.    SH: Reviewed and documented in the appropriate area of the chart.    FH: Her family history was reviewed and documented in its appropriate chart area.    PE: /59   Pulse 84   Ht 1.575 m (5' 2\")   Wt 57.6 kg (127 lb)   LMP 2019 (Exact Date)   SpO2 98%   Breastfeeding No   BMI 23.23 kg/m    Body mass index is 23.23 kg/m .    Urine HCG was +  Hrt - RRR without murmur  Lungs - CTAB  Breasts - no palpable mass or nipple discharge  Neck - supple without palpable mass  Abd - soft, nontender, unable to hear fhts with the doppler so I performed a table-side US and was not able to confirm fetal movement or fhts so will check a formal US today.  Pelvic - normal female genitalia, normal vaginal mucosa, closed cervix without motion tenderness, " gravid uterus without tenderness, no adnexal mass or tenderness  Ext - negative    A/P:   - I discussed with her nutrition and medication concerns related to pregnancy.  We discussed folic acid supplementation.  We reviewed prenatal care.  She is given the opportunity to ask questions and have them answered.    30 minutes were spent face to face with the patient today discussing her history, diagnosis, and follow-up plan as noted above.  Over 50% of the visit was spent in counseling and coordination of care.  She has an appt with the genetic counselor at [facility] on 11/26/19.  Will schedule an OB US today to verify viability.  She was not due for a pap smear so this was not obtained.    Total Visit Time: 30 minutes.    Orders Placed This Encounter   Procedures     US OB < 14 Weeks Single     US Bedside Non Radiology     Treponema Abs w Reflex to RPR and Titer     Hepatitis B surface antigen     *UA reflex to Microscopic and Culture ([place]; [facility]; [facility]; [facility]; [facility]; [facility]; [place]; [place])     Rubella Antibody IgG Quantitative     CBC with platelets     HIV Antigen Antibody Combo     ABO/Rh type and screen     Michelle Lee, DO  FACOG, FACS

## 2019-11-16 LAB
RUBV IGG SERPL IA-ACNC: 99 IU/ML
T PALLIDUM AB SER QL: NONREACTIVE

## 2019-11-17 LAB
C TRACH DNA SPEC QL NAA+PROBE: NEGATIVE
N GONORRHOEA DNA SPEC QL NAA+PROBE: NEGATIVE
SPECIMEN SOURCE: NORMAL
SPECIMEN SOURCE: NORMAL

## 2019-11-18 LAB
ABO + RH BLD: NORMAL
ABO + RH BLD: NORMAL
BLD GP AB SCN SERPL QL: NORMAL
BLOOD BANK CMNT PATIENT-IMP: NORMAL
HBV SURFACE AG SERPL QL IA: NONREACTIVE
HIV 1+2 AB+HIV1 P24 AG SERPL QL IA: NONREACTIVE
SPECIMEN EXP DATE BLD: NORMAL

## 2019-11-26 ENCOUNTER — TRANSFERRED RECORDS (OUTPATIENT)
Dept: HEALTH INFORMATION MANAGEMENT | Facility: CLINIC | Age: 34
End: 2019-11-26

## 2019-12-16 ENCOUNTER — PRENATAL OFFICE VISIT (OUTPATIENT)
Dept: OBGYN | Facility: CLINIC | Age: 34
End: 2019-12-16
Payer: COMMERCIAL

## 2019-12-16 VITALS
DIASTOLIC BLOOD PRESSURE: 64 MMHG | SYSTOLIC BLOOD PRESSURE: 109 MMHG | BODY MASS INDEX: 24.29 KG/M2 | HEART RATE: 82 BPM | WEIGHT: 132.8 LBS

## 2019-12-16 DIAGNOSIS — O09.522 MULTIGRAVIDA OF ADVANCED MATERNAL AGE IN SECOND TRIMESTER: Primary | ICD-10-CM

## 2019-12-16 PROCEDURE — 99207 ZZC PRENATAL VISIT: CPT | Performed by: OBSTETRICS & GYNECOLOGY

## 2019-12-16 NOTE — PROGRESS NOTES
She has not felt fetal movement yet but will record when this starts.  She gained 5 lbs since her last visit and denies any fluid leakage or regular uterine contractions.  Her Verifi results were negative on 11/26/19 and the pt is aware of this result.  She has met with the genetic counselor already due to AMA.  She declines a flu vaccine.  She c/o left ear discomfort so was referred to FP for f/u since my otoscope is not working today.  Her Samoan interpretor was present throughout today's exam.

## 2019-12-16 NOTE — PATIENT INSTRUCTIONS
If you have any questions regarding your visit, Please contact your care team.    [facility]: [phone]      [facility] HOURS TELEPHONE NUMBER   Michelle DO Jesus.    SHAN Moser -    FRANCOISE Brannon, FRANCOISE Conti RN     Monday, Wednesday, Thursday and Friday, [place]  8:30a.m-5:00 p.m   [facility]  [address] [place] [address]  [phone] ask for [facility]    Imaging [phone]       Urgent Care locations:    [facility] Saturday and Sunday   9 am - 5 pm    Monday-Friday   12 pm - 8 pm  Saturday and Sunday   9 am - 5 pm   [phone]     [facility] Labor and Delivery:  [phone]    If you need a medication refill, please contact your pharmacy. Please allow 3 business days for your refill to be completed.  As always, Thank you for trusting us with your healthcare needs!

## 2020-01-06 ENCOUNTER — TRANSFERRED RECORDS (OUTPATIENT)
Dept: HEALTH INFORMATION MANAGEMENT | Facility: CLINIC | Age: 35
End: 2020-01-06

## 2020-01-09 ENCOUNTER — TELEPHONE (OUTPATIENT)
Dept: OBGYN | Facility: CLINIC | Age: 35
End: 2020-01-09

## 2020-01-09 ENCOUNTER — OFFICE VISIT (OUTPATIENT)
Dept: FAMILY MEDICINE | Facility: CLINIC | Age: 35
End: 2020-01-09
Payer: COMMERCIAL

## 2020-01-09 VITALS
HEART RATE: 90 BPM | WEIGHT: 130 LBS | HEIGHT: 62 IN | BODY MASS INDEX: 23.92 KG/M2 | DIASTOLIC BLOOD PRESSURE: 68 MMHG | OXYGEN SATURATION: 99 % | TEMPERATURE: 97.6 F | SYSTOLIC BLOOD PRESSURE: 104 MMHG

## 2020-01-09 DIAGNOSIS — R68.89 FLU-LIKE SYMPTOMS: Primary | ICD-10-CM

## 2020-01-09 PROCEDURE — 99213 OFFICE O/P EST LOW 20 MIN: CPT | Performed by: PHYSICIAN ASSISTANT

## 2020-01-09 RX ORDER — OSELTAMIVIR PHOSPHATE 75 MG/1
75 CAPSULE ORAL 2 TIMES DAILY
Qty: 10 CAPSULE | Refills: 0 | Status: SHIPPED | OUTPATIENT
Start: 2020-01-09 | End: 2020-01-28

## 2020-01-09 ASSESSMENT — PAIN SCALES - GENERAL: PAINLEVEL: EXTREME PAIN (8)

## 2020-01-09 ASSESSMENT — MIFFLIN-ST. JEOR: SCORE: 1242.93

## 2020-01-09 NOTE — PROGRESS NOTES
Subjective     Jo Ann Patel is a 34 year old female who presents to clinic today for the following health issues:    HPI   ENT Symptoms             Symptoms: cc Present Absent Comment   Fever/Chills  x  101.6   Fatigue  x     Muscle Aches  x     Eye Irritation  x     Sneezing   x    Nasal Adarsh/Drg  x     Sinus Pressure/Pain  x     Loss of smell  x     Dental pain   x    Sore Throat  x     Swollen Glands  x     Ear Pain/Fullness  x     Cough  x     Wheeze  x     Chest Pain  x     Shortness of breath  x     Rash   x    Other         Symptom duration:  about 3 days ago   Symptom severity:  moderate   Treatments tried:  Tylenol   Contacts:  kids        Didn't get flu vacc    No Known Allergies    Patient Active Problem List   Diagnosis     H. pylori infection     Gastroesophageal reflux disease, esophagitis presence not specified     Hepatic steatosis     Non-ulcer dyspepsia     Gallbladder sludge     Itchy eyes     Allergic conjunctivitis, right     Acne rosacea       Past Medical History:   Diagnosis Date     Hepatic steatosis 5/1/2019       albuterol (PROAIR HFA/PROVENTIL HFA/VENTOLIN HFA) 108 (90 Base) MCG/ACT inhaler, Inhale 1-2 puffs into the lungs every 4 hours as needed for shortness of breath / dyspnea or wheezing  augmented betamethasone dipropionate (DIPROLENE-AF) 0.05 % cream, Apply sparingly to affected area twice daily as needed.  Do not apply to face.  cetirizine (ZYRTEC) 10 MG tablet, Take 1 tablet (10 mg) by mouth daily as needed for allergies  metroNIDAZOLE (METROGEL) 0.75 % external gel, Apply topically 2 times daily  olopatadine (PATANOL) 0.1 % ophthalmic solution, Place 1 drop into the right eye 2 times daily  ranitidine (ZANTAC) 150 MG tablet, Take 1 tablet (150 mg) by mouth 2 times daily    No current facility-administered medications on file prior to visit.       Social History     Tobacco Use     Smoking status: Never Smoker     Smokeless tobacco: Never Used   Substance Use Topics      "Alcohol use: No       Family History   Problem Relation Age of Onset     Cerebrovascular Disease Maternal Grandfather      Heart Disease Maternal Grandfather      Other - See Comments Daughter         Hydrocephaly       ROS:  Consitutional: As above  ENT: As above  Respiratory: As above    OBJECTIVE:  /68 (BP Location: Left arm, Patient Position: Chair, Cuff Size: Adult Regular)   Pulse 90   Temp 97.6  F (36.4  C) (Oral)   Ht 1.575 m (5' 2\")   Wt 59 kg (130 lb)   LMP 08/28/2019 (Exact Date)   SpO2 99%   BMI 23.78 kg/m    GENERAL APPEARANCE: healthy, alert and no distress  EYES: conjunctiva clear  HENT:    TMs w/o erythema, effusion or bulging.  Nose and mouth without ulcers, erythema or lesions.  NO tonsillar enlargement erythema or exudates.   NECK: supple, nontender, no lymphadenopathy  RESP: lungs clear to auscultation - no rales, rhonchi or wheezes  CV: regular rates and rhythm, normal S1 S2, no murmur noted  NEURO: awake, alert          ASSESSMENT: Well appearing.      ICD-10-CM    1. Flu-like symptoms R68.89 oseltamivir (TAMIFLU) 75 MG capsule         PLAN:  Lots of rest and fluids.  RTC if any worsening symptoms or if not improving.    Cameron Vincent PA-C           "

## 2020-01-09 NOTE — TELEPHONE ENCOUNTER
RN took call to triage patient with interpretive services on the line.    Patient currently 19w1d.    Patient wondering what medications she can take during pregnancy.    RN asked what symptoms patient is having. Patient states she is having sore throat, chest congestion, painful when coughing, eyes burning and teary, and green phlegm. Patients states last night she had a temperature of 100.4F and then again this morning of 101.6F.     RN gave patient list of medications safe to take during pregnancy: For a sore throat/cough: You can have throat lozenges- Halls, Ricola, Cepacol. DO NOT TAKE ZINC DROPS.   For Cough: Guaifenesin (Mucinex, plain Robitussin) for a dry cough (don't take durring 1st trimester). Dextromethorphan can also be used.   For Nasal Congestion: You can try saline nasal spray, or take Afrin nasal spray (do not take more than 3 or 4 days).   For Allergies: Benadryl, Zyrtec, and Claritin are ok to take.   Tylenol is appropriate to take for fever or pain.     Please make sure you get plenty of rest and drink plenty of fluids (80-100oz a day). A humidifier or vaporizer may also be helpful for congestion.    RN advised patient to be seen by family practice or urgent care today for further evaluation of her symptoms. Patient verbalized understanding and agreed to plan to go to urgent care today.    Kimmy Franklin RN on 1/9/2020 at 8:49 AM

## 2020-01-09 NOTE — PATIENT INSTRUCTIONS
At [facility], we strive to deliver an exceptional experience to you, every time we see you. If you receive a survey, please complete it as we do value your feedback.  If you have MyChart, you can expect to receive results automatically within 24 hours of their completion.  Your provider will send a note interpreting your results as well.   If you do not have MyChart, you should receive your results in about a week by mail.    Your care team:                            Family Medicine Internal Medicine   MD Suhail Monzon, MD Ana Yanez PA-C, APRN CNP    Everette Ibrahim, MD Pediatrics   Cameron Vincent, PABRANT Vance, MD Cathy Thompson APRN CNP   MD Brianna Carbajal MD Deborah Mielke, MD Kim Thein, APRN [facility]      Clinic hours: Monday - Thursday 7 am-7 pm; Fridays 7 am-5 pm.   Urgent care: Monday - Friday 11 am-9 pm; Saturday and Sunday 9 am-5 pm.  Pharmacy : Monday -Thursday 8 am-8 pm; Friday 8 am-6 pm; Saturday and Sunday 9 am-5 pm.     Clinic: [phone]   Pharmacy: [phone]    For a sore throat/cough: You can have throat lozenges- Halls, Ricola, Cepacol. DO NOT TAKE ZINC DROPS.   For Cough: Guaifenesin (Mucinex, plain Robitussin) for a dry cough (don't take durring 1st trimester). Dextromethorphan can also be used.   For Nasal Congestion: You can try saline nasal spray, or take Afrin nasal spray (do not take more than 3 or 4 days).   For Allergies: Benadryl, Zyrtec, and Claritin are ok to take.   Tylenol is appropriate to take for fever or pain.      Please make sure you get plenty of rest and drink plenty of fluids (80-100oz a day). A humidifier or vaporizer may also be helpful for congestion.     RN advised patient to be seen by family practice or urgent care today for further evaluation of her symptoms. Patient verbalized understanding and agreed to plan to go to urgent care  today.      Influenza (Adult)  Influenza, also called the flu, is a viral illness that affects the air passages of the lungs. It differs from the common cold. It is highly contagious. It may be spread through the air by coughing and sneezing or by direct contact (touching the sick person and then touching your own eyes, nose or mouth).  Illness starts 1-3 days after exposure and lasts for 1-2 weeks. Antibiotics are usually not needed unless a complication appears (ear or sinus infection or pneumonia).  Symptoms may be mild or severe and can include extreme tiredness (wanting to stay in bed all day), chills, fevers, muscle aching, soreness with eye movement, headache, and a dry, hacking cough.  Home Care:  Avoid exposure to cigarette smoke (yours or others ).  Tylenol or ibuprofen (Advil) will help fever, muscle aching, and headache. To avoid risk of liver injury, aspirin should not be used in children and teenagers under 18 with this illness.  Nausea and loss of appetite are common. A light diet is recommended. Avoid dehydration by drinking 6-8 glasses of fluids per day (water, sport drinks like Gatorade, soft drinks without caffeine, juices, tea, soup, etc.). Extra fluids will also help loosen secretions in the nose and lungs.  Over-the-counter cold medicines will not shorten the duration of the illness but may be helpful for the following symptoms: cough (Robitussin DM); sore throat (Chloraseptic lozenges or spray); nasal and sinus congestion (Actifed or Sudafed). [NOTE: Do not use decongestants if you have high blood pressure.]  Stay home until your fever has been gone for at least 24 hours (without the use of fever-reducing medications such as ibuprofen).  Follow Up  with your doctor or as directed by our staff if you are not improving over the next week.  Note: If you are age 65 or older, or if you have chronic asthma or COPD, we recommend a pneumococcal vaccination every five years. All adults should receive a  yearly influenza vaccination every autumn. Ask your doctor about this.  Get Prompt Medical Attention  if any of the following occur:  Cough with lots of colored sputum (mucus) or blood in your sputum  Chest pain, shortness of breath, wheezing, or difficulty breathing  Severe headache, face, neck or ear pain  New rash  Fever of 100.4 F (38 C) oral or higher, not better with fever medication  Confusion, behavior change or seizure  Severe weakness or dizziness    5826-[address], [place] [address]. All rights reserved. This information is not intended as a substitute for professional medical care. Always follow your healthcare professional's instructions.

## 2020-01-13 ENCOUNTER — OFFICE VISIT (OUTPATIENT)
Dept: PEDIATRICS | Facility: CLINIC | Age: 35
End: 2020-01-13
Payer: COMMERCIAL

## 2020-01-13 ENCOUNTER — PRENATAL OFFICE VISIT (OUTPATIENT)
Dept: OBGYN | Facility: CLINIC | Age: 35
End: 2020-01-13
Payer: COMMERCIAL

## 2020-01-13 VITALS
OXYGEN SATURATION: 100 % | SYSTOLIC BLOOD PRESSURE: 102 MMHG | BODY MASS INDEX: 23.96 KG/M2 | DIASTOLIC BLOOD PRESSURE: 62 MMHG | TEMPERATURE: 97.4 F | HEART RATE: 74 BPM | WEIGHT: 131 LBS

## 2020-01-13 VITALS
OXYGEN SATURATION: 96 % | WEIGHT: 131 LBS | TEMPERATURE: 98 F | BODY MASS INDEX: 23.96 KG/M2 | HEART RATE: 76 BPM | DIASTOLIC BLOOD PRESSURE: 63 MMHG | SYSTOLIC BLOOD PRESSURE: 95 MMHG

## 2020-01-13 DIAGNOSIS — J10.1 INFLUENZA B: Primary | ICD-10-CM

## 2020-01-13 DIAGNOSIS — Z34.82 ENCOUNTER FOR SUPERVISION OF OTHER NORMAL PREGNANCY, SECOND TRIMESTER: Primary | ICD-10-CM

## 2020-01-13 PROCEDURE — 99207 ZZC PRENATAL VISIT: CPT | Performed by: OBSTETRICS & GYNECOLOGY

## 2020-01-13 PROCEDURE — 99213 OFFICE O/P EST LOW 20 MIN: CPT | Performed by: FAMILY MEDICINE

## 2020-01-13 RX ORDER — CETIRIZINE HYDROCHLORIDE 10 MG/1
10 TABLET ORAL DAILY
Qty: 20 TABLET | Refills: 0 | Status: SHIPPED | OUTPATIENT
Start: 2020-01-13 | End: 2020-01-28

## 2020-01-13 RX ORDER — ACETAMINOPHEN 325 MG/1
325-650 TABLET ORAL PRN
COMMUNITY
End: 2021-07-14

## 2020-01-13 RX ORDER — ALBUTEROL SULFATE 90 UG/1
1-2 AEROSOL, METERED RESPIRATORY (INHALATION) EVERY 4 HOURS PRN
Qty: 1 INHALER | Refills: 0 | Status: SHIPPED | OUTPATIENT
Start: 2020-01-13 | End: 2020-12-04

## 2020-01-13 ASSESSMENT — PAIN SCALES - GENERAL: PAINLEVEL: MILD PAIN (2)

## 2020-01-13 NOTE — PATIENT INSTRUCTIONS
If you have any questions regarding your visit, Please contact your care team.    [employer]: [phone]      [facility] HOURS TELEPHONE NUMBER   Michelle DO Jesus.    SHAN Moser -    FRANCOISE Brannon, FRANCOISE Conti RN     Monday, Wednesday, Thursday and Friday, [place]  8:30a.m-5:00 p.m   [facility]  [address] [place] [address]  [phone] ask for [facility]    Imaging [phone]       Urgent Care locations:    [facility] Saturday and Sunday   9 am - 5 pm    Monday-Friday   12 pm - 8 pm  Saturday and Sunday   9 am - 5 pm   [phone]     [facility] Labor and Delivery:  [phone]    If you need a medication refill, please contact your pharmacy. Please allow 3 business days for your refill to be completed.  As always, Thank you for trusting us with your healthcare needs!

## 2020-01-13 NOTE — PATIENT INSTRUCTIONS
Patient Education     Patient Education    Influenza A (H1N1) 2009 monovalent vaccine Nasal spray, solution    Influenza A (H1N1) 2009 monovalent vaccine Suspension for injection  Influenza A (H1N1) 2009 monovalent vaccine Nasal spray, solution  What is this medicine?  INTRANASAL H1N1 INFLUENZA (SWINE FLU) VACCINE (in [facility] LORENA zuhl H1N1 in floo EN [facility] (swvinodyn floo) vak SEEN) helps reduce the risk of getting the pandemic H1N1 flu also known as the swine flu. The vaccine only helps protect you against this one strain of the flu. This vaccine does not help to the reduce the risk of getting other types of flu. You may also need to get the seasonal influenza virus vaccine.  This medicine may be used for other purposes; ask your health care provider or pharmacist if you have questions.  What should I tell my health care provider before I take this medicine?  They need to know if you have any of these conditions:    asthma or wheezing    Guillain-Mascot syndrome    immune system problems    under 18 and taking aspirin    an unusual or allergic reaction to intranasal influenza vaccine, eggs, gentamicin, gelatin, arginine, other medicines, foods, dyes or preservatives    pregnant or trying to get pregnant    breast-feeding  How should I use this medicine?  This vaccine is for use in the nose. It is given by a health care professional.  A copy of Vaccine Information Statements will be given before each vaccination. Read this sheet carefully each time. The sheet may change frequently.  Talk to your pediatrician regarding the use of this medicine in children. While this drug may be prescribed for children as young as 2 years for selected conditions, precautions do apply.  Overdosage: If you think you've taken too much of this medicine contact a poison control center or emergency room at once.  NOTE: This medicine is only for you. Do not share this medicine with others.  What if I miss a dose?  If needed, keep appointments for  follow-up (booster) doses as directed. It is important not to miss your dose. Call your doctor or health care professional if you are unable to keep an appointment.  What may interact with this medicine?  Do not take this medicine with any of the following medications:    anakinra    rilonacept    tumor necrosis factor (TNF) modifiers like adalimumab, etanercept, infliximab, golimumab, or certolizumab  This medicine may also interact with the following medications:    aspirin and aspirin-like medicines    medicines for organ transplant    medicines to treat cancer    medicines to treat the flu    other vaccines    steroid medicines like prednisone or cortisone  This list may not describe all possible interactions. Give your health care provider a list of all the medicines, herbs, non-prescription drugs, or dietary supplements you use. Also tell them if you smoke, drink alcohol, or use illegal drugs. Some items may interact with your medicine.  What should I watch for while using this medicine?  Report any side effects to your doctor right away.  After receiving this vaccine, stay away from people who have severe immune system problems for 7 days. You may give them the flu.  This vaccine lowers your risk of getting the pandemic H1N1 flu. You can get a milder H1N1 flu infection if you are around others with this flu. This flu vaccine will not protect against colds or other illnesses including other flu viruses. You may also need the seasonal influenza vaccine.  What side effects may I notice from receiving this medicine?  Side effects that you should report to your doctor or health care professional as soon as possible:    allergic reactions like skin rash, itching or hives, swelling of the face, lips, or tongue    breathing problems    muscle weakness    unusual drooping or paralysis of face  Side effects that usually do not require medical attention (report to your doctor or health care professional if they continue  or are bothersome):    chills    cough    headache    muscle aches and pains    runny or stuffy nose    sore throat    stomach upset    tiredness  This list may not describe all possible side effects. Call your doctor for medical advice about side effects. You may report side effects to FDA at 2-049-KTJ-8539.  Where should I keep my medicine?  This vaccine is only given in a clinic, pharmacy, doctor's office, or other health care setting and will not be stored at home.  NOTE: This sheet is a summary. It may not cover all possible information. If you have questions about this medicine, talk to your doctor, pharmacist, or health care provider.  NOTE:This sheet is a summary. It may not cover all possible information. If you have questions about this medicine, talk to your doctor, pharmacist, or health care provider. Copyright  2016 Gold Standard

## 2020-01-13 NOTE — PROGRESS NOTES
Subjective     Jo Ann Paetl is a 34 year old female who presents to clinic today for the following health issues:    HPI   Patient was recently diagnosed with influenza. Daugter diagnosed with influenza B on Thursday, patient developed fever and upper respiratory infection symptoms Friday. She is approximately 19 weeks 5/7 days. Seen at urgent care . Last dose of tamiflu today. Afebrile  For 48 hours. Appetite low, fatigue, some chest tightness and sore throat. Tylenol helping with symptoms. Just saw OB and things seem to be progressing well. Declined flu shot due to concerns for safety.    Patient Active Problem List   Diagnosis     H. pylori infection     Gastroesophageal reflux disease, esophagitis presence not specified     Hepatic steatosis     Non-ulcer dyspepsia     Gallbladder sludge     Itchy eyes     Allergic conjunctivitis, right     Acne rosacea     Past Surgical History:   Procedure Laterality Date     APPENDECTOMY        SECTION N/A 7/3/2017    Procedure:  SECTION;  Primary  Section ;  Surgeon: Luz Maria Kwan MD;  Location: [facility] L+D     HC INSERTION INTRAUTERINE DEVICE       HC REMOVE INTRAUTERINE DEVICE         Social History     Tobacco Use     Smoking status: Never Smoker     Smokeless tobacco: Never Used   Substance Use Topics     Alcohol use: No     Family History   Problem Relation Age of Onset     Cerebrovascular Disease Maternal Grandfather      Heart Disease Maternal Grandfather      Other - See Comments Daughter         Hydrocephaly         Current Outpatient Medications   Medication Sig Dispense Refill     acetaminophen (TYLENOL) 325 MG tablet Take 325-650 mg by mouth as needed for mild pain       albuterol (PROAIR HFA/PROVENTIL HFA/VENTOLIN HFA) 108 (90 Base) MCG/ACT inhaler Inhale 1-2 puffs into the lungs every 4 hours as needed for shortness of breath / dyspnea or wheezing 1 Inhaler 0     cetirizine (ZYRTEC) 10 MG tablet Take 1  tablet (10 mg) by mouth daily 20 tablet 0     metroNIDAZOLE (METROGEL) 0.75 % external gel Apply topically 2 times daily 45 g 1     oseltamivir (TAMIFLU) 75 MG capsule Take 1 capsule (75 mg) by mouth 2 times daily for 5 days 10 capsule 0     No Known Allergies  Recent Labs   Lab Test 10/31/18  1552 08/27/18  0929 07/04/17  2215   LDL  --  65  --    HDL  --  47*  --    TRIG  --  34  --    ALT 84*  --  12   CR 0.77  --  0.71   GFRESTIMATED 87  --  >90  Non  GFR Calc     GFRESTBLACK >90  --  >90  African American GFR Calc     POTASSIUM 3.6  --  4.2   TSH  --  1.89  --       BP Readings from Last 3 Encounters:   01/13/20 102/62   01/13/20 95/63   01/09/20 104/68    Wt Readings from Last 3 Encounters:   01/13/20 59.4 kg (131 lb)   01/13/20 59.4 kg (131 lb)   01/09/20 59 kg (130 lb)                    Reviewed and updated as needed this visit by Provider  Tobacco  Allergies  Meds  Problems  Med Hx  Surg Hx  Fam Hx         Review of Systems   ROS COMP: Constitutional, HEENT, cardiovascular, pulmonary, GI, , musculoskeletal, neuro, skin, endocrine and psych systems are negative, except as otherwise noted.      Objective    /62   Pulse 74   Temp 97.4  F (36.3  C) (Oral)   Wt 59.4 kg (131 lb)   LMP 08/28/2019 (Exact Date)   SpO2 100%   BMI 23.96 kg/m    Body mass index is 23.96 kg/m .  Physical Exam   GENERAL: healthy, alert and no distress  EYES: Eyes grossly normal to inspection, PERRL and conjunctivae and sclerae normal  HENT: ear canals and TM's normal, nose and mouth without ulcers or lesions  NECK: no adenopathy, no asymmetry, masses, or scars and thyroid normal to palpation  RESP: lungs clear to auscultation - no rales, rhonchi or wheezes  CV: regular rate and rhythm, normal S1 S2, no S3 or S4, no murmur, click or rub, no peripheral edema and peripheral pulses strong  ABDOMEN: gravid  MS: no gross musculoskeletal defects noted, no edema            Assessment & Plan     1. Influenza  B  Symptomatic therapy suggested: push fluids, rest, use vaporizer or mist needed  and use acetaminophen as needed.   - albuterol (PROAIR HFA/PROVENTIL HFA/VENTOLIN HFA) 108 (90 Base) MCG/ACT inhaler; Inhale 1-2 puffs into the lungs every 4 hours as needed for shortness of breath / dyspnea or wheezing  Dispense: 1 Inhaler; Refill: 0  - cetirizine (ZYRTEC) 10 MG tablet; Take 1 tablet (10 mg) by mouth daily  Dispense: 20 tablet; Refill: 0           Return if symptoms worsen or fail to improve.    José Antonio Molina MD  [facility]

## 2020-01-13 NOTE — PROGRESS NOTES
She reports feeling reassuring daily fetal activity and will continue to record.  She lost 1 lb since her last visit but possibly has influenza and will be evaluated for this by FP this afternoon.  She reports feeling reassuring daily fetal activity and will continue to record.  She declines a flu vaccine today since she does not feel well.

## 2020-01-28 ENCOUNTER — APPOINTMENT (OUTPATIENT)
Dept: INTERPRETER SERVICES | Facility: CLINIC | Age: 35
End: 2020-01-28
Payer: COMMERCIAL

## 2020-01-28 ENCOUNTER — OFFICE VISIT (OUTPATIENT)
Dept: PEDIATRICS | Facility: CLINIC | Age: 35
End: 2020-01-28
Payer: COMMERCIAL

## 2020-01-28 VITALS
BODY MASS INDEX: 24.42 KG/M2 | WEIGHT: 132.7 LBS | HEIGHT: 62 IN | TEMPERATURE: 98.5 F | DIASTOLIC BLOOD PRESSURE: 65 MMHG | OXYGEN SATURATION: 97 % | HEART RATE: 97 BPM | SYSTOLIC BLOOD PRESSURE: 100 MMHG

## 2020-01-28 DIAGNOSIS — R49.0 VOICE HOARSENESSES: ICD-10-CM

## 2020-01-28 DIAGNOSIS — R21 MALAR RASH: ICD-10-CM

## 2020-01-28 DIAGNOSIS — J01.90 ACUTE SINUSITIS WITH SYMPTOMS > 10 DAYS: ICD-10-CM

## 2020-01-28 DIAGNOSIS — B00.2 HERPES GINGIVOSTOMATITIS: Primary | ICD-10-CM

## 2020-01-28 DIAGNOSIS — L71.9 ACNE ROSACEA: ICD-10-CM

## 2020-01-28 PROCEDURE — 99214 OFFICE O/P EST MOD 30 MIN: CPT | Performed by: FAMILY MEDICINE

## 2020-01-28 RX ORDER — METRONIDAZOLE 7.5 MG/G
GEL TOPICAL 2 TIMES DAILY
Qty: 45 G | Refills: 1 | Status: SHIPPED | OUTPATIENT
Start: 2020-01-28 | End: 2020-03-12

## 2020-01-28 RX ORDER — ACYCLOVIR 400 MG/1
400 TABLET ORAL EVERY 8 HOURS
Qty: 30 TABLET | Refills: 0 | Status: SHIPPED | OUTPATIENT
Start: 2020-01-28 | End: 2020-02-12

## 2020-01-28 RX ORDER — AZITHROMYCIN 250 MG/1
TABLET, FILM COATED ORAL
Qty: 6 TABLET | Refills: 0 | Status: SHIPPED | OUTPATIENT
Start: 2020-01-28 | End: 2020-02-12

## 2020-01-28 ASSESSMENT — PAIN SCALES - GENERAL: PAINLEVEL: SEVERE PAIN (6)

## 2020-01-28 ASSESSMENT — MIFFLIN-ST. JEOR: SCORE: 1255.17

## 2020-01-28 NOTE — PATIENT INSTRUCTIONS
Start on ACYCLOVIR for tongue sores  Start on z-rocío  Push fluids    Patient Education     Sinusitis (tratamiento con antibióticos)    Los senos paranasales son cavidades llenas de aire dentro de los huesos de la isabella. Se conectan con la parte interna de la nariz. La sinusitis es ange inflamación del tejido que recubre la cavidad del seno paranasal. Neo inflamación puede presentarse aleksander un resfrío. También puede ocurrir por alergia al polen y a otras partículas que haya en el aire. La sinusitis puede provocar congestión de los senos paranasales y sensación de tener la yas  pesada . Ange infección de los senos paranasales causa fiebre, dolor de yas y dolor en la isabella. Suele jorge secreciones verdosas o amarillentas de la nariz o por la parte posterior de la garganta (goteo post-nasal). Se recetan antibióticos para tratar esta enfermedad.  Cuidados en el hogar    Debe lexie todos los antibióticos hasta terminarlos, medardo andie le hayan indicado. No deje de tomarlos, incluso aunque se sienta mejor.    Beverly mucha agua, té caliente y otros líquidos para mantenerse tyler hidratado. Eso diluye la mucosidad. También puede facilitar el drenaje de los senos paranasales.    Aplique calor sobre las áreas de la isabella que le duelen. Use ange toalla empapada en [place]. También puede pararse en la ducha y dejar que le caiga el [place] sobre la isabella. Utilizar un vaporizador y pomada de mentol aleksander la noche también puede ayudar a aliviar los síntomas.     Un expectorante que contenga guaifenesina ayuda a diluir la mucosidad y a facilitar el drenaje de los senos paranasales.    Puede lexie algún descongestivo sin receta a menos que le hayan recetado algún medicamento similar. Los aerosoles nasales son los que tienen efecto más rápido. Use alguno que contenga fenilefrina u oximetazolina. Aba, suénese la nariz suavemente. Luego use el aerosol. No use estos medicamentos con mayor frecuencia que la indicada en la  etiqueta. Si lo hace, los síntomas pueden empeorar. También puede lexie comprimidos que contengan pseudoefedrina. No use productos que combinen medicamentos múltiples. Yaurel podría aumentar los efectos secundarios. Angelica las etiquetas. También puede pedirle ayuda al farmacéutico. (Las personas con presión arterial josé antonio no deberían lexie descongestivos. Estos pueden subir la presión).    Los antihistamínicos de venta jana son útiles si la sinusitis se debe a ange alergia.      No use productos de irrigación ni enjuagues nasales aleksander ange infección aguda de los senos paranasales, a menos que se lo haya indicado schafer proveedor de atención médica. El enjuague puede diseminar la infección a otras cavidades de los senos paranasales.    Puede lexie paracetamol o ibuprofeno para controlar el dolor, a menos que le hayan recetado otro medicamento. Si tiene ange enfermedad hepática o renal crónica, o ha tenido alguna vez ange úlcera estomacal, consulte con schafer proveedor de atención médica antes de lexie estos medicamentos. (La aspirina no debe usarse nunca en personas menores de 18 años enfermas con fiebre. Puede causar graves daños hepáticos).    No fume. Yaurel puede empeorar los síntomas.  Atención de seguimiento  Realice ange visita de control a schafer proveedor de atención médica o a philippe centro si se siente mejor en ange semana.  Cuándo buscar atención médica  Llame a schafer proveedor de atención médica si ocurre algo de lo siguiente:    Dolor en la isabella o dolor de yas que se hace más ulices    Rigidez en el frankie    Somnolencia inusual o confusión    Hinchazón de la frente o los párpados    Problemas de visión; por ejemplo, visión borrosa o doble    Fiebre de 100,4  F (38  C) o más josé antonio, o según lo que le haya indicado el proveedor de atención médica    Convulsiones    Problemas para respirar    Los síntomas no desaparecen en 10 adela  Prevención  Puede adoptar las siguientes medidas para prevenir ange infección:    Ivisa sampson  hábitos higiénicos de lavado de taj.    Evite el contacto cercano con personas que tengan dolor de garganta, resfriados u otras infecciones respiratorias superiores.    No fume y evite exponerse al humo de cigarrillos de otras personas.    Asegúrese de tener astrid vacunas al día.  Date Last Reviewed: 11/1/2017 2000-2019 [employer]. [address] [place] [id number]. Todos los derechos reservados. Esta información no pretende sustituir la atención médica profesional. Sólo schafer médico puede diagnosticar y tratar un problema de jeannette.

## 2020-01-28 NOTE — PROGRESS NOTES
Subjective     Jo Ann Patel is a 34 year old female who presents to clinic today for the following health issues:    HPI   Acute Illness   Acute illness concerns: sore throat, patient is 21w6d pregnant.   Patient is a  2 para 1, currently 22 weeks pregnant is here with concerns of having ongoing nasal/sinus congestion, pain over both cheeks and teeth, productive cough, fatigue frontal headaches and purulent nasal drainage with intermittent sore throat and postnasal drainage for the past 3 weeks.  Patient recently was treated for influenza B 3 weeks ago.  Patient had wheezing during the first week of symptoms but not anymore  Denies fever, chills, decrease or lack of appetite, hemoptysis, night sweats  Patient had voice hoarseness and voice loss for the past 3 days from excessive coughing  Does not smoke. Has no h/o asthnma or allergies.  She also had very painful sores on the tongue and along the inside of the lower lip that she noticed for the past 1 week  Patient denies bleeding, previous similar symptoms in the past  Onset: 20 days    Fever: no     Chills/Sweats: no     Headache (location?): YES    Sinus Pressure:YES    Conjunctivitis:  no    Ear Pain: YES: bilateral pressure but pain in right ear    Rhinorrhea: YES    Congestion: YES    Sore Throat: YES     Cough: YES-productive of clear sputum, productive of yellow sputum, productive of green sputum - patient also reports some blood, cough worse at night    Wheeze: YES- sometimes    Decreased Appetite: no    Nausea: YES- sometimes    Vomiting: no     Diarrhea:  no     Dysuria/Freq.: no but abdominal pressure    Fatigue/Achiness: YES    Sick/Strep Exposure: no      Therapies Tried and outcome: Tylenol and hot tea     Refill Request - Metronidazole  Has history of acne rosacea for which she was using metronidazole gel.  Rash flared up since her pregnancy        Patient Active Problem List   Diagnosis     H. pylori infection     Gastroesophageal  reflux disease, esophagitis presence not specified     Hepatic steatosis     Non-ulcer dyspepsia     Gallbladder sludge     Itchy eyes     Allergic conjunctivitis, right     Acne rosacea     Past Surgical History:   Procedure Laterality Date     APPENDECTOMY        SECTION N/A 7/3/2017    Procedure:  SECTION;  Primary  Section ;  Surgeon: Luz Maria Kwan MD;  Location: [facility] L[facility]     HC INSERTION INTRAUTERINE DEVICE       HC REMOVE INTRAUTERINE DEVICE         Social History     Tobacco Use     Smoking status: Never Smoker     Smokeless tobacco: Never Used   Substance Use Topics     Alcohol use: No     Family History   Problem Relation Age of Onset     Cerebrovascular Disease Maternal Grandfather      Heart Disease Maternal Grandfather      Other - See Comments Daughter         Hydrocephaly         Current Outpatient Medications   Medication Sig Dispense Refill     acetaminophen (TYLENOL) 325 MG tablet Take 325-650 mg by mouth as needed for mild pain       acyclovir (ZOVIRAX) 400 MG tablet Take 1 tablet (400 mg) by mouth every 8 hours for 10 days 30 tablet 0     albuterol (PROAIR HFA/PROVENTIL HFA/VENTOLIN HFA) 108 (90 Base) MCG/ACT inhaler Inhale 1-2 puffs into the lungs every 4 hours as needed for shortness of breath / dyspnea or wheezing 1 Inhaler 0     azithromycin (ZITHROMAX) 250 MG tablet Two tablets first day, then one tablet daily for four days. 6 tablet 0     metroNIDAZOLE (METROGEL) 0.75 % external gel Apply topically 2 times daily 45 g 1     No Known Allergies  Recent Labs   Lab Test 10/31/18  1552 18  0929 17  2215   LDL  --  65  --    HDL  --  47*  --    TRIG  --  34  --    ALT 84*  --  12   CR 0.77  --  0.71   GFRESTIMATED 87  --  >90  Non  GFR Calc     GFRESTBLACK >90  --  >90  African American GFR Calc     POTASSIUM 3.6  --  4.2   TSH  --  1.89  --       BP Readings from Last 3 Encounters:   20 100/65   20 102/62  "  01/13/20 95/63    Wt Readings from Last 3 Encounters:   01/28/20 60.2 kg (132 lb 11.2 oz)   01/13/20 59.4 kg (131 lb)   01/13/20 59.4 kg (131 lb)                    Reviewed and updated as needed this visit by Provider         Review of Systems   ROS COMP: CONSTITUTIONAL: NEGATIVE for fever, chills, change in weight  INTEGUMENTARY/SKIN: as above  EYES: NEGATIVE for vision changes or irritation  ENT/MOUTH: as above  RESP:as above  CV: NEGATIVE for chest pain, palpitations or peripheral edema  GI: NEGATIVE for nausea, abdominal pain, heartburn, or change in bowel habits  -pregnant  MUSCULOSKELETAL: NEGATIVE for significant arthralgias or myalgia  PSYCHIATRIC: NEGATIVE for changes in mood or affect      Objective    /65 (BP Location: Right arm, Patient Position: Sitting, Cuff Size: Adult Regular)   Pulse 97   Temp 98.5  F (36.9  C) (Oral)   Ht 1.575 m (5' 2\")   Wt 60.2 kg (132 lb 11.2 oz)   LMP 08/28/2019 (Exact Date)   SpO2 97%   BMI 24.27 kg/m    Body mass index is 24.27 kg/m .  Physical Exam   GENERAL: healthy, alert and no distress  EYES: Eyes grossly normal to inspection  HENT: normal cephalic/atraumatic, both ears: mucopurulent effusion, rhinorrhea yellow and green, oropharynx clear, oral mucous membranes moist, sinuses: maxillary, frontal, ethmoid tenderness on both sides and shallow, tender superficial ulcers and buccal mucosa along the lower lip line  NECK: no adenopathy, no asymmetry, masses, or scars and thyroid normal to palpation  RESP: lungs clear to auscultation - no rales, rhonchi or wheezes  CV: regular rate and rhythm, normal S1 S2, no S3 or S4, no murmur, click or rub, no peripheral edema and peripheral pulses strong  ABDOMEN: Gravid  MS: no gross musculoskeletal defects noted, no edema  SKIN: Erythematous papular rash over the cheeks and some on the forehead  PSYCH: mentation appears normal, affect normal/bright    Diagnostic Test Results:  Labs reviewed in Epic        Assessment & " Plan     1. Herpes gingivostomatitis  Reviewed the etiology of the tongue sores  Give education handouts on the same, reviewed oral care and hygiene, good hydration  Avoid spicy and sour foods  Start on Zovirax 3 times daily for 10 days  Dosing and potential medication side effects discussed.  RTC in 1week if no better by then or sooner prn.    Patient verbalised understanding and is agreeable to the plan.    - acyclovir (ZOVIRAX) 400 MG tablet; Take 1 tablet (400 mg) by mouth every 8 hours for 10 days  Dispense: 30 tablet; Refill: 0    2. Acute sinusitis with symptoms > 10 days  Dosing and potential medication side effects discussed.  Recommended to push fluids, warm face compresses, rest, tylenol prn for pain, wet steam inhalation and RTC in 1week if no better by then or sooner prn.    - azithromycin (ZITHROMAX) 250 MG tablet; Two tablets first day, then one tablet daily for four days.  Dispense: 6 tablet; Refill: 0    3. Voice hoarsenesses  Recommended rest to voice  Treat underlying sinus infection as mentioned above    4. Malar rash  Refills given to restart due to recurrent rash  - metroNIDAZOLE (METROGEL) 0.75 % external gel; Apply topically 2 times daily  Dispense: 45 g; Refill: 1    5. Acne rosacea  as above    - metroNIDAZOLE (METROGEL) 0.75 % external gel; Apply topically 2 times daily  Dispense: 45 g; Refill: 1       Chart documentation done in part with Dragon Voice recognition Software. Although reviewed after completion, some word and grammatical error may remain.    See Patient Instructions    No follow-ups on file.    Robson Wilson MD  [facility]

## 2020-02-12 ENCOUNTER — PRENATAL OFFICE VISIT (OUTPATIENT)
Dept: OBGYN | Facility: CLINIC | Age: 35
End: 2020-02-12
Payer: COMMERCIAL

## 2020-02-12 VITALS
DIASTOLIC BLOOD PRESSURE: 62 MMHG | HEART RATE: 82 BPM | OXYGEN SATURATION: 100 % | BODY MASS INDEX: 25.24 KG/M2 | SYSTOLIC BLOOD PRESSURE: 102 MMHG | WEIGHT: 138 LBS

## 2020-02-12 DIAGNOSIS — Z34.82 ENCOUNTER FOR SUPERVISION OF OTHER NORMAL PREGNANCY, SECOND TRIMESTER: Primary | ICD-10-CM

## 2020-02-12 PROCEDURE — 99207 ZZC PRENATAL VISIT: CPT | Performed by: OBSTETRICS & GYNECOLOGY

## 2020-02-12 NOTE — PROGRESS NOTES
She reports feeling reassuring daily fetal activity and will continue to record.  She gained 7 lbs since her last visit since she feels well again and denies any fluid leakage or regular uterine contractions.  She prefers to check the diabetic screen at her next visit so declines today.  Her Rh factor is + so she is not a rhogam candidate.  She is not interested in a PPTL since she may want more children - remains undecided.  The British interpretor was present throughout today's visit.

## 2020-02-12 NOTE — PATIENT INSTRUCTIONS
If you have any questions regarding your visit, Please contact your care team.     [employer]: [phone]  [facility] CLINIC HOURS TELEPHONE NUMBER       Arnaldo Buchanan M.D.      Tigist Nix-Medical Assistant    Gar[place]-  Carly-     Monday-[place]  8:00a.m-4:45 p.m  Tuesday-[place]  9:00a.m-4:00 p.m  Wednesday-[place] 8:00a.m-4:45 p.m.  Thursday-[place]  8:00a.m-4:45 p.m.  Friday-[place]  8:00a.m-4:45 p.m. [facility]  [address]  [place], [place] [address] [phone] ask [facility]  [phone] Fax  Imaging [phone]    [facility]  [address]  [place], [place] [address] [phone]    [facility]  [address] [place] [address] [phone] [facility]  [phone] Fax  Imaging [phone]     Urgent Care locations:    [facility] Monday-Friday  5 pm - 9 pm  Saturday and Sunday   9 am - 5 pm  Monday-Friday   11 am - 9 pm  Saturday and Sunday   9 am - 5 pm   [phone]   If you need a medication refill, please contact your pharmacy. Please allow 3 business days for your refill to be completed.  As always, Thank you for trusting us with your healthcare needs!

## 2020-03-10 ENCOUNTER — DOCUMENTATION ONLY (OUTPATIENT)
Dept: LAB | Facility: CLINIC | Age: 35
End: 2020-03-10

## 2020-03-10 DIAGNOSIS — Z34.80 SUPERVISION OF OTHER NORMAL PREGNANCY: Primary | ICD-10-CM

## 2020-03-12 ENCOUNTER — PRENATAL OFFICE VISIT (OUTPATIENT)
Dept: OBGYN | Facility: CLINIC | Age: 35
End: 2020-03-12
Payer: COMMERCIAL

## 2020-03-12 VITALS
BODY MASS INDEX: 26.54 KG/M2 | SYSTOLIC BLOOD PRESSURE: 105 MMHG | DIASTOLIC BLOOD PRESSURE: 60 MMHG | HEART RATE: 88 BPM | OXYGEN SATURATION: 97 % | WEIGHT: 145.1 LBS

## 2020-03-12 DIAGNOSIS — Z23 FLU VACCINE NEED: ICD-10-CM

## 2020-03-12 DIAGNOSIS — Z34.80 SUPERVISION OF OTHER NORMAL PREGNANCY: ICD-10-CM

## 2020-03-12 LAB
GLUCOSE 1H P 50 G GLC PO SERPL-MCNC: 114 MG/DL (ref 60–129)
HGB BLD-MCNC: 10.8 G/DL (ref 11.7–15.7)

## 2020-03-12 PROCEDURE — 82950 GLUCOSE TEST: CPT | Performed by: OBSTETRICS & GYNECOLOGY

## 2020-03-12 PROCEDURE — 36415 COLL VENOUS BLD VENIPUNCTURE: CPT | Performed by: OBSTETRICS & GYNECOLOGY

## 2020-03-12 PROCEDURE — 99207 ZZC PRENATAL VISIT: CPT | Performed by: OBSTETRICS & GYNECOLOGY

## 2020-03-12 PROCEDURE — 00000218 ZZHCL STATISTIC OBHBG - HEMOGLOBIN: Performed by: OBSTETRICS & GYNECOLOGY

## 2020-03-12 NOTE — PROGRESS NOTES
She reports feeling reassuring daily fetal activity and will continue to record.  She denies any fluid leakage or regular uterine contractions.  She gained 7 lbs again so we discussed a healthier diet.  She admits to craving bread so will try to eat more fresh fruits instead.  She declined a flu vaccine.  Will check her diabetic screen and hgb values today.

## 2020-03-12 NOTE — PATIENT INSTRUCTIONS
If you have any questions regarding your visit, Please contact your care team.    [employer]: [phone]      [facility] HOURS TELEPHONE NUMBER   Michelle DO Jesus.    SHAN Moser -    FRANCOISE Brannon, FRANCOISE Conti RN     Monday, Wednesday, Thursday and Friday, [place]  8:30a.m-5:00 p.m   [facility]  [address] [place] [address]  [phone] ask for [facility]    Imaging [phone]       Urgent Care locations:    [facility] Saturday and Sunday   9 am - 5 pm    Monday-Friday   12 pm - 8 pm  Saturday and Sunday   9 am - 5 pm   [phone]     [facility] Labor and Delivery:  [phone]    If you need a medication refill, please contact your pharmacy. Please allow 3 business days for your refill to be completed.  As always, Thank you for trusting us with your healthcare needs!

## 2020-03-26 ENCOUNTER — VIRTUAL VISIT (OUTPATIENT)
Dept: OBGYN | Facility: CLINIC | Age: 35
End: 2020-03-26
Payer: COMMERCIAL

## 2020-03-26 DIAGNOSIS — B00.2 HERPES GINGIVOSTOMATITIS: ICD-10-CM

## 2020-03-26 DIAGNOSIS — Z34.83 ENCOUNTER FOR SUPERVISION OF OTHER NORMAL PREGNANCY, THIRD TRIMESTER: Primary | ICD-10-CM

## 2020-03-26 PROCEDURE — 99207 ZZC PRENATAL VISIT: CPT | Performed by: ADVANCED PRACTICE MIDWIFE

## 2020-03-26 RX ORDER — ACYCLOVIR 400 MG/1
400 TABLET ORAL EVERY 8 HOURS
Qty: 30 TABLET | Refills: 0 | Status: SHIPPED | OUTPATIENT
Start: 2020-03-26 | End: 2020-04-22

## 2020-03-26 NOTE — PROGRESS NOTES
"Jo Ann Patel is a 35 year old female who is being evaluated via a billable telephone visit.      The patient has been notified of following:     \"This telephone visit will be conducted via a call between you and your physician/provider. We have found that certain health care needs can be provided without the need for a physical exam.  This service lets us provide the care you need with a short phone conversation.  If a prescription is necessary we can send it directly to your pharmacy.  If lab work is needed we can place an order for that and you can then stop by our lab to have the test done at a later time.    If during the course of the call the physician/provider feels a telephone visit is not appropriate, you will not be charged for this service.\"     Jo Ann Patel complains of    Chief Complaint   Patient presents with     Prenatal Care     30w1d       I have reviewed and updated the patient's Past Medical History, Social History, Family History and Medication List.    ALLERGIES  Patient has no known allergies.    Additional provider notes:isit completed with a .   Feeling well,  No complaints other than some increasing pelvic pressure.  She was also treated in January for herpes gingivitis and feels that the valtrex helped while she was taking it but it has returned again and would like a refill of the prescription.   Recommended to continue to take her iron   No contra/lof/vb  Has a bit of swelling that goes away at night.  Had swelling that was worse with her previous pregnancy . No HA.   Baby is active.   Discussed why she is having a phone visit and will have an in person visit at the clinic in two weeks.      Assessment/Plan:  1. Encounter for supervision of other normal pregnancy, third trimester      2. Herpes gingivostomatitis    - acyclovir (ZOVIRAX) 400 MG tablet; Take 1 tablet (400 mg) by mouth every 8 hours for 10 days  Dispense: 30 tablet; Refill: " 0    Phone call duration: 30 min    Day TREVA Ricci CNM

## 2020-04-07 ENCOUNTER — PRENATAL OFFICE VISIT (OUTPATIENT)
Dept: OBGYN | Facility: CLINIC | Age: 35
End: 2020-04-07
Payer: COMMERCIAL

## 2020-04-07 VITALS
SYSTOLIC BLOOD PRESSURE: 105 MMHG | BODY MASS INDEX: 26.52 KG/M2 | WEIGHT: 145 LBS | DIASTOLIC BLOOD PRESSURE: 60 MMHG | HEART RATE: 91 BPM

## 2020-04-07 DIAGNOSIS — Z34.83 ENCOUNTER FOR SUPERVISION OF OTHER NORMAL PREGNANCY, THIRD TRIMESTER: Primary | ICD-10-CM

## 2020-04-07 PROCEDURE — 99207 ZZC PRENATAL VISIT: CPT | Performed by: OBSTETRICS & GYNECOLOGY

## 2020-04-07 NOTE — PROGRESS NOTES
31w6d.  Voice interprater visit with nurse present. .  No HA, visual changes, Tdap next visit. . RTC 2 wk/prn.    ICD-10-CM    1. Encounter for supervision of other normal pregnancy, third trimester  Z34.83      CEPHAS AGBEH, MD.

## 2020-04-22 ENCOUNTER — PRENATAL OFFICE VISIT (OUTPATIENT)
Dept: OBGYN | Facility: CLINIC | Age: 35
End: 2020-04-22
Payer: COMMERCIAL

## 2020-04-22 ENCOUNTER — APPOINTMENT (OUTPATIENT)
Dept: INTERPRETER SERVICES | Facility: CLINIC | Age: 35
End: 2020-04-22
Payer: COMMERCIAL

## 2020-04-22 VITALS
TEMPERATURE: 98.1 F | WEIGHT: 147.5 LBS | BODY MASS INDEX: 29.73 KG/M2 | SYSTOLIC BLOOD PRESSURE: 104 MMHG | HEIGHT: 59 IN | HEART RATE: 96 BPM | DIASTOLIC BLOOD PRESSURE: 68 MMHG | OXYGEN SATURATION: 96 %

## 2020-04-22 DIAGNOSIS — Z34.83 ENCOUNTER FOR SUPERVISION OF OTHER NORMAL PREGNANCY, THIRD TRIMESTER: Primary | ICD-10-CM

## 2020-04-22 DIAGNOSIS — Z23 NEED FOR TDAP VACCINATION: ICD-10-CM

## 2020-04-22 PROCEDURE — 90471 IMMUNIZATION ADMIN: CPT | Performed by: NURSE PRACTITIONER

## 2020-04-22 PROCEDURE — 99207 ZZC PRENATAL VISIT: CPT | Performed by: NURSE PRACTITIONER

## 2020-04-22 PROCEDURE — 90715 TDAP VACCINE 7 YRS/> IM: CPT | Performed by: NURSE PRACTITIONER

## 2020-04-22 ASSESSMENT — PAIN SCALES - GENERAL: PAINLEVEL: NO PAIN (0)

## 2020-04-22 ASSESSMENT — MIFFLIN-ST. JEOR: SCORE: 1269.69

## 2020-04-22 NOTE — PROGRESS NOTES
Prior to immunization administration, verified patients identity using patient s name and date of birth. Please see Immunization Activity for additional information.     Screening Questionnaire for Adult Immunization    Are you sick today?   No   Do you have allergies to medications, food, a vaccine component or latex?   No   Have you ever had a serious reaction after receiving a vaccination?   No   Do you have a long-term health problem with heart, lung, kidney, or metabolic disease (e.g., diabetes), asthma, a blood disorder, no spleen, complement component deficiency, a cochlear implant, or a spinal fluid leak?  Are you on long-term aspirin therapy?   No   Do you have cancer, leukemia, HIV/AIDS, or any other immune system problem?   No   Do you have a parent, brother, or sister with an immune system problem?   No   In the past 3 months, have you taken medications that affect  your immune system, such as prednisone, other steroids, or anticancer drugs; drugs for the treatment of rheumatoid arthritis, Crohn s disease, or psoriasis; or have you had radiation treatments?   No   Have you had a seizure, or a brain or other nervous system problem?   No   During the past year, have you received a transfusion of blood or blood    products, or been given immune (gamma) globulin or antiviral drug?   No   For women: Are you pregnant or is there a chance you could become       pregnant during the next month?   Yes   Have you received any vaccinations in the past 4 weeks?   No     Immunization questionnaire was positive for at least one answer.  Notified. Nicky TILLEY CNP.        Per orders of Nicky TILLEY CNP, injection of Tdap given by Jyoti Pagan CMA. Patient instructed to remain in clinic for 15 minutes afterwards, and to report any adverse reaction to me immediately.       Screening performed by Jyoti Pagan CMA on 4/22/2020 at 2:15 PM.

## 2020-04-22 NOTE — PROGRESS NOTES
Patient presents for routine prenatal visit. Prenatal flowsheet reviewed and updated as needed.  Visit conducted via video interpretor.  Denies vaginal bleeding, loss of fluid, contractions or cramping. Denies headache, nausea/vomiting, upper abdominal pain, vision changes, lower extremity swelling, chest pain or shortness of breath.  Patient without complaint, but is starting to feel seasonal allergies and is unsure what she can take. Relief options reviewed.  Tdap given.  Plan GBS and ultrasound for fetal position on return to clinic.    Advice as per Anticipatory Guidance/Checklist updated.  PE: See OB vitals    Questions asked and answered. Next OB visit in 2 week(s) with MD. Nicky TILLEY CNP

## 2020-05-04 ENCOUNTER — TELEPHONE (OUTPATIENT)
Dept: OBGYN | Facility: CLINIC | Age: 35
End: 2020-05-04

## 2020-05-04 ENCOUNTER — APPOINTMENT (OUTPATIENT)
Dept: INTERPRETER SERVICES | Facility: CLINIC | Age: 35
End: 2020-05-04
Payer: COMMERCIAL

## 2020-05-04 NOTE — TELEPHONE ENCOUNTER
"Patient is a 35 year old, , 35 weeks and 5 days today.     She calls nurse triage line through  services to talk about contractions. RN spoke with patient for a total of 13 minutes and she had two contractions during this time. Last >30 seconds.     Per patient report contractions started last night around 9-10 pm, \"stomach hard like basketball, increased pressure and pain down low, come and go\". She continues to have them this morning, not as painful and still coming and going. She states every 30 minutes or less and lasting anywhere from a few seconds to greater than 30 seconds.      No leakage of fluid. No vaginal bleeding.     Last night were more painful with sitting. When she stood up or changed positions it made them less painful but didn't stop them from coming. Starting on sides of abdomen and moving to front of abdomen.     RN advises that patient continue to monitor contractions, continue to time them. Rest, take warm bath and eat/drink see if this affects her contractions. She was advised to go in with contractions at every 5 min or less, lasting 1 min+ in duration for at least one hour OR if her water breaks she needs to present to Labor & Delivery at [facility].     Patient verbalized understanding and agreed to plan.   RN answered all patient questions. She is comfortable with plan.     Racquel Corbin RN on 2020 at 9:20 AM              "

## 2020-05-06 ENCOUNTER — PRENATAL OFFICE VISIT (OUTPATIENT)
Dept: OBGYN | Facility: CLINIC | Age: 35
End: 2020-05-06
Payer: COMMERCIAL

## 2020-05-06 VITALS
HEART RATE: 84 BPM | DIASTOLIC BLOOD PRESSURE: 67 MMHG | BODY MASS INDEX: 30.13 KG/M2 | WEIGHT: 149.2 LBS | OXYGEN SATURATION: 98 % | SYSTOLIC BLOOD PRESSURE: 113 MMHG

## 2020-05-06 DIAGNOSIS — Z36.81 ENCOUNTER FOR ANTENATAL SCREENING FOR HYDROPS FETALIS: Primary | ICD-10-CM

## 2020-05-06 DIAGNOSIS — O34.219 PATIENT DESIRES VAGINAL BIRTH AFTER CESAREAN SECTION (VBAC): ICD-10-CM

## 2020-05-06 PROCEDURE — 87653 STREP B DNA AMP PROBE: CPT | Performed by: OBSTETRICS & GYNECOLOGY

## 2020-05-06 PROCEDURE — 99207 ZZC PRENATAL VISIT: CPT | Performed by: OBSTETRICS & GYNECOLOGY

## 2020-05-06 NOTE — PROGRESS NOTES
She reports feeling reassuring daily fetal activity and will continue to record.  She gained 2 lbs since her last visit and denies any fluid leakage or regular uterine contractions.  A table-side limited OB US was performed today and verified a vertex presentation with normal AFV and reassuring gross fetal movement.  Her GBS culture was obtained and submitted.  She was provided an ACOG pamphlet on GBS.  She denies any PCN allergy.  She is hoping for a  and informed consents has been reviewed and obtained.  A Syriac interpretor was used via [employer] throughout today's visit.

## 2020-05-07 LAB
GP B STREP DNA SPEC QL NAA+PROBE: NEGATIVE
SPECIMEN SOURCE: NORMAL

## 2020-05-12 ENCOUNTER — PRENATAL OFFICE VISIT (OUTPATIENT)
Dept: OBGYN | Facility: CLINIC | Age: 35
End: 2020-05-12
Payer: COMMERCIAL

## 2020-05-12 VITALS
SYSTOLIC BLOOD PRESSURE: 106 MMHG | BODY MASS INDEX: 30.01 KG/M2 | WEIGHT: 148.6 LBS | HEART RATE: 88 BPM | DIASTOLIC BLOOD PRESSURE: 69 MMHG

## 2020-05-12 DIAGNOSIS — Z34.83 ENCOUNTER FOR SUPERVISION OF OTHER NORMAL PREGNANCY, THIRD TRIMESTER: Primary | ICD-10-CM

## 2020-05-12 PROCEDURE — 99207 ZZC PRENATAL VISIT: CPT | Performed by: OBSTETRICS & GYNECOLOGY

## 2020-05-12 NOTE — PROGRESS NOTES
She reports feeling reassuring daily fetal activity and will continue to record.  She lost 1 lb since her last visit but denies dieting.  She also denies any fluid leakage or regular uterine contractions.  Her GBS status is negative and her cervix remains closed/thick/-3/intact/vertex.  She still plans on a  attempt.  [employer] was used for Mauritian interpreting throughout today's visit.

## 2020-05-12 NOTE — PATIENT INSTRUCTIONS
If you have any questions regarding your visit, Please contact your care team.    [employer]: [phone]      [facility] CLINIC HOURS TELEPHONE NUMBER   Michelle Lee .    SHAN Moser -  Carly -       FRANCOISE Brannon, RN  Kimmy, RN     Monday, Wednesday, Thursday and Friday, [place]  8:30a.m-5:00 p.m   [facility]  [address] [place] [address]  [phone] ask for [facility]    Imaging [phone]       Urgent Care locations:    [facility] Saturday and Sunday   9 am - 5 pm    Monday-Friday   12 pm - 8 pm  Saturday and Sunday   9 am - 5 pm   [phone]     [facility] Labor and Delivery:  [phone]    If you need a medication refill, please contact your pharmacy. Please allow 3 business days for your refill to be completed.  As always, Thank you for trusting us with your healthcare needs!

## 2020-05-20 ENCOUNTER — PRENATAL OFFICE VISIT (OUTPATIENT)
Dept: OBGYN | Facility: CLINIC | Age: 35
End: 2020-05-20
Payer: COMMERCIAL

## 2020-05-20 VITALS
SYSTOLIC BLOOD PRESSURE: 110 MMHG | OXYGEN SATURATION: 98 % | HEART RATE: 82 BPM | DIASTOLIC BLOOD PRESSURE: 64 MMHG | BODY MASS INDEX: 30.7 KG/M2 | WEIGHT: 152 LBS

## 2020-05-20 DIAGNOSIS — Z34.83 ENCOUNTER FOR SUPERVISION OF OTHER NORMAL PREGNANCY, THIRD TRIMESTER: Primary | ICD-10-CM

## 2020-05-20 DIAGNOSIS — Z36.89 DETERMINE FETAL PRESENTATION USING ULTRASOUND: ICD-10-CM

## 2020-05-20 PROCEDURE — 99207 ZZC PRENATAL VISIT: CPT | Performed by: OBSTETRICS & GYNECOLOGY

## 2020-05-20 NOTE — PROGRESS NOTES
She reports feeling reassuring daily fetal activity and will continue to record.  She gained 4 lbs since her last visit and denies any fluid leakage or regular uterine contractions.  She is concerned that her baby is flipped to a breech presentation so a repeat table-side OB US was performed and verified a vertex position with normal AFV and reassuring gross fetal movement.  Her GBS status is negative and her cervix remains unfavorable - see above.  She declined the [place] interpretation service today since she did not feel that it was needed.

## 2020-05-26 ENCOUNTER — APPOINTMENT (OUTPATIENT)
Dept: INTERPRETER SERVICES | Facility: CLINIC | Age: 35
End: 2020-05-26
Payer: COMMERCIAL

## 2020-05-27 ENCOUNTER — PRENATAL OFFICE VISIT (OUTPATIENT)
Dept: OBGYN | Facility: CLINIC | Age: 35
End: 2020-05-27
Payer: COMMERCIAL

## 2020-05-27 VITALS
WEIGHT: 153.19 LBS | HEART RATE: 70 BPM | SYSTOLIC BLOOD PRESSURE: 106 MMHG | BODY MASS INDEX: 30.94 KG/M2 | TEMPERATURE: 98.1 F | DIASTOLIC BLOOD PRESSURE: 71 MMHG

## 2020-05-27 DIAGNOSIS — Z34.83 ENCOUNTER FOR SUPERVISION OF OTHER NORMAL PREGNANCY, THIRD TRIMESTER: Primary | ICD-10-CM

## 2020-05-27 PROCEDURE — 99207 ZZC PRENATAL VISIT: CPT | Performed by: OBSTETRICS & GYNECOLOGY

## 2020-05-27 NOTE — PROGRESS NOTES
She reports feeling reassuring daily fetal activity and will continue to record.  She gained 1.2 lbs since her last visit and denies any fluid leakage or regular uterine contractions.  Her cervix remains essentially unchanged and unfavorable - see above.  Her GBS status is negative.  The [employer] was used for Latvian interpretation.

## 2020-05-27 NOTE — PATIENT INSTRUCTIONS
If you have any questions regarding your visit, Please contact your care team.    [employer]: [phone]      [facility] CLINIC HOURS TELEPHONE NUMBER   Michelle Lee .    SHAN Moser -  Carly -       FRANCOISE Brannon, RN  Kimmy, RN     Monday, Wednesday, Thursday and Friday, [place]  8:30a.m-5:00 p.m   [facility]  [address] [place] [address]  [phone] ask for [facility]    Imaging [phone]       Urgent Care locations:    [facility] Saturday and Sunday   9 am - 5 pm    Monday-Friday   12 pm - 8 pm  Saturday and Sunday   9 am - 5 pm   [phone]     [facility] Labor and Delivery:  [phone]    If you need a medication refill, please contact your pharmacy. Please allow 3 business days for your refill to be completed.  As always, Thank you for trusting us with your healthcare needs!

## 2020-05-31 ENCOUNTER — NURSE TRIAGE (OUTPATIENT)
Dept: NURSING | Facility: CLINIC | Age: 35
End: 2020-05-31

## 2020-05-31 NOTE — TELEPHONE ENCOUNTER
"    Additional Information    Negative: Passed out (i.e., lost consciousness, collapsed and was not responding)    Negative: Shock suspected (e.g., cold/pale/clammy skin, too weak to stand, low BP, rapid pulse)    Negative: Difficult to awaken or acting confused (e.g., disoriented, slurred speech)    Negative: [1] SEVERE abdominal pain (e.g., excruciating) AND [2] constant AND [3] present > 1 hour    Negative: Severe bleeding (e.g., continuous red blood from vagina, or large blood clots)    Negative: Umbilical cord hanging out of the vagina (shiny, white, curled appearance, \"like telephone cord\")    Negative: Uncontrollable urge to push (i.e., feels like baby is coming out now)    Negative: Can see baby    Negative: Sounds like a life-threatening emergency to the triager    Negative: Pregnant < 37 weeks (i.e., )    Negative: [1] Uncertain delivery date AND [2] possibly pregnant < 37 weeks (i.e., )    Negative: [1] First baby (primipara) AND [2] contractions < 6 minutes apart  AND [3] present 2 hours    Negative: [1] History of prior delivery (multipara) AND [2] contractions < 10 minutes apart AND [3] present 1 hour    Negative: [1] History of rapid prior delivery AND [2] contractions < 10 minutes apart    Negative: [1] Leakage of fluid from vagina AND [2] green or brown in color    Negative: [1] Leakage of fluid from vagina AND [2] leakage started > 4 hours ago    Negative: Vaginal bleeding or spotting    Negative: Baby moving less today (e.g., kick count < 5 in 1 hour or < 10 in 2 hours)    Negative: Severe headache or headache that won't go away    Negative: New blurred vision or vision changes    Negative: MODERATE-SEVERE abdominal pain    Negative: Fever > 100.4 F (38.0 C)    Negative: [1] Leakage of fluid from vagina AND [2] leakage started < 4 hours ago    Negative: Patient sounds very sick or weak to the triager    [1] History of prior delivery (multipara) AND [2] contractions > 10 minutes, or " "for < 1 hour    Negative: [1] First baby (primipara) AND [2] contractions > 5 minutes apart, or for < 2 hours    Answer Assessment - Initial Assessment Questions  1. ONSET: \"When did the symptoms begin?\"         This morning  2. CONTRACTIONS: \"Describe the contractions that you are having.\" (e.g., duration, frequency, regularity, severity)      15-20 minutes apart  3. KERWIN: \"What date are you expecting to deliver?\"      39w5d  4. PARITY: \"Have you had a baby before?\" If yes, \"How long did the labor last?\"      third  5. FETAL MOVEMENT: \"Has the baby's movement decreased or changed significantly from normal?\"      ok  6. OTHER SYMPTOMS: \"Do you have any other symptoms?\" (e.g., leaking fluid from vagina, vaginal bleeding, fever, hand/facial swelling)      Small bloody show    Protocols used: PREGNANCY - LABOR-A-      "

## 2020-06-01 ENCOUNTER — TELEPHONE (OUTPATIENT)
Dept: OBGYN | Facility: CLINIC | Age: 35
End: 2020-06-01

## 2020-06-01 ENCOUNTER — APPOINTMENT (OUTPATIENT)
Dept: INTERPRETER SERVICES | Facility: CLINIC | Age: 35
End: 2020-06-01
Payer: COMMERCIAL

## 2020-06-01 NOTE — TELEPHONE ENCOUNTER
Patient's OB history (G & Ps): , currently 39w5d  Patient's complaints/signs and symptoms: pt having irregular contractions every 20 minutes but having difficulty talking through them, pt lost mucus plug last Saturday, not felt baby move yet today and having some bleeding since 4:00AM this morning  Plan:L&D for evaluation    RN made sure pt had L&D number if needed. RN paged Dr. Flores and gave SBAR.      Patient verbalized understanding and agreed to plan.   Patient was given the opportunity to ask additional questions and have them answered. Patient had no further questions.   Kimmy Franklin RN on 2020 at 9:32 AM

## 2020-07-21 ENCOUNTER — VIRTUAL VISIT (OUTPATIENT)
Dept: DERMATOLOGY | Facility: CLINIC | Age: 35
End: 2020-07-21
Payer: COMMERCIAL

## 2020-07-21 DIAGNOSIS — L71.9 ROSACEA: Primary | ICD-10-CM

## 2020-07-21 PROCEDURE — 99212 OFFICE O/P EST SF 10 MIN: CPT | Mod: 95 | Performed by: DERMATOLOGY

## 2020-07-21 RX ORDER — METRONIDAZOLE 7.5 MG/G
GEL TOPICAL
COMMUNITY
Start: 2020-01-28 | End: 2020-07-21

## 2020-07-21 RX ORDER — AZELAIC ACID 0.15 G/G
GEL TOPICAL
Qty: 50 G | Refills: 3 | Status: SHIPPED | OUTPATIENT
Start: 2020-07-21 | End: 2020-09-24

## 2020-07-21 RX ORDER — ADAPALENE 0.1 G/100G
CREAM TOPICAL
Qty: 45 G | Refills: 0 | Status: SHIPPED | OUTPATIENT
Start: 2020-07-21 | End: 2020-07-21

## 2020-07-21 RX ORDER — METRONIDAZOLE 7.5 MG/G
GEL TOPICAL
Qty: 45 G | Refills: 4 | Status: SHIPPED | OUTPATIENT
Start: 2020-07-21 | End: 2020-12-04

## 2020-07-21 ASSESSMENT — PAIN SCALES - GENERAL: PAINLEVEL: NO PAIN (0)

## 2020-07-21 NOTE — PROGRESS NOTES
[facility]  [facility] Forward [phone]: Pt was called but then requested Belgian interpretor immediately, called back [id number] ID of interpretor.       Impression and Recommendations (Patient Counseled on the Following):  Rosacea. Possibly with comedones and xerosis chin.  BREASTFEEDING. MIxed oily and dry skin (xerosis and description of acne on chin)    Continue metronidazole 0.75% gel once daily    Finacea once daily or every other day if  Too drying    Use Vanicream several timesfor face once daily      Follow-up:   Follow-up with dermatology in approximately 8 weeks photos and phone. Hold creams if too drying and contact Dr. Payan, she understands.      Staff only    Denise Payan MD    Department of Dermatology  [facility]: Phone: [phone], Fax:[phone]  [facility]: Phone: [phone], Fax: [phone]        _____________________________________________________________________________    Dermatology Problem List:  1. Rosacea  - Prev response to metronidazole 0.75% gel BID  - Current t/x: Doxy 100 mg, metronidazole 0.75% gel  2. Hx of borderline ABRAHAN       Encounter Date: Jul 21, 2020    CC:   Chief Complaint   Patient presents with     Derm Problem     Rosacea f/u              History of Present Illness:  I have reviewed the teledermatology information and the nursing intake corresponding to this issue. Jo Ann Patel is a 35 year old female who presents via teledermatology for acne rosacea, is using metrogel for 2  Years now. Pork or spicy food makes it worse. Still red on cheeks and chin. Using metrogel twice daily. No on doxycycline anymore.     She feels that she feels more rashes on the chin. She describes these rashes as little pimples.       ROS: Patient is generally feeling well today  She is breast feeding    Physical  Examination:  General: Well-sounding  Skin: Focused examination within the teledermatology photograph(s) including   was performed. No all photos focused  -redness on the right cheek, appears patchy  -possibly xerosis, right chin  Labs:  NA    Past Medical History:   Patient Active Problem List   Diagnosis     H. pylori infection     Gastroesophageal reflux disease, esophagitis presence not specified     Hepatic steatosis     Non-ulcer dyspepsia     Gallbladder sludge     Itchy eyes     Allergic conjunctivitis, right     Acne rosacea     Flu vaccine need     Patient desires vaginal birth after  section ()     Past Medical History:   Diagnosis Date     Hepatic steatosis 2019     Past Surgical History:   Procedure Laterality Date     APPENDECTOMY        SECTION N/A 7/3/2017    Procedure:  SECTION;  Primary  Section ;  Surgeon: Luz Maria Kwan MD;  [facility]     HC INSERTION INTRAUTERINE DEVICE       HC REMOVE INTRAUTERINE DEVICE         Social History:  Patient reports that she has never smoked. She has never used smokeless tobacco. She reports that she does not drink alcohol or use drugs.    Family History:  Family History   Problem Relation Age of Onset     Cerebrovascular Disease Maternal Grandfather      Heart Disease Maternal Grandfather      Other - See Comments Daughter         Hydrocephaly       Medications:  Current Outpatient Medications   Medication     acetaminophen (TYLENOL) 325 MG tablet     albuterol (PROAIR HFA/PROVENTIL HFA/VENTOLIN HFA) 108 (90 Base) MCG/ACT inhaler     Prenatal Vit-Fe Fumarate-FA (PRENATAL VITAMINS PO)     No current facility-administered medications for this visit.           No Known Allergies      _____________________________________________________________________________    Teledermatology information:  - Location of patient: [place]  - Patient presented as: return  - [facility]ermatologist[facility]  [facility] )  - Reason teledermatology is appropriate:  of National Emergency Regarding Coronavirus disease (COVID 19) Outbreak  - Image quality and interpretability: limited  - Physician has received verbal consent for a Video/Photos Visit from the patient? Yes  - In-person dermatology visit recommendation: no  - Date of images: 7/21/2020  - Service start time:12:29pm  - Service end time:12:40pm  - Date of report: 7/21/2020         Teledermatology Nurse Call for RETURN patients seen within the last 3 years:      The patient was contacted by phone and we reviewed they have a visit in teledermatology upcoming with an MD or PABRANT;  Importantly, \"a teledermatology visit may not be as thorough as an in-person visit, and the quality of the photograph and/or video sent may not be of the same quality as that taken by the dermatology clinic.\"     We have found that certain health care needs can be provided without the need for an in-person physical exam.   If a prescription is necessary we can send it directly to your pharmacy.  If lab work is needed we can place an order for that and you can then stop by our lab to have the test done at a later time.If during the course of the call the physician/provider feels a video visit is not appropriate, you will not be charged for this service.Visits are billed at different rates depending on your insurance coverage. Please reach out to your insurance provider with any questions.    The patient chose to:                                                                                                                                                                                                                    Consent to a teledermatology visit with mychart photos. The patient understood they must upload a mychart photo for this visit to be completed. They indicated that the photo will be taken at their home address(if other address please document here). Patient told " nursing these are already uploaded .  The patient was instructed to take photos of all all areas of concern and all areas of any rash from near and far away.                                                                                                                                                                                                                               The patient will send photographs via [employer] for review. Instructions for photography are being sent to the patient via [employer] messaging.       The patient verified the location of the photo/video visit to be [place].(The physician must be notified by nurse if the patient is not in the state of [place] during the encounter)    The patient denied skin pain, fever, mucosal symptoms(lesions, blisters, sores in the mouth, nose, eyes, or genitals)  IF PATIENT ENDORSES ANY OF THESE STOP AND PAGE ON CALL ATTENDING

## 2020-07-21 NOTE — LETTER
7/21/2020         RE: Jo Ann Patel  [address]  [place] [address]        Dear Colleague,    Thank you for referring your patient, Jo Ann Patel, to the [facility]. Please see a copy of my visit note below.    [facility]  Store [facility] Forward [phone]: Pt was called but then requested Azerbaijani interpretor immediately, called back [id number] ID of interpretor.       Impression and Recommendations (Patient Counseled on the Following):  Rosacea. Possibly with comedones and xerosis chin.  BREASTFEEDING. MIxed oily and dry skin (xerosis and description of acne on chin)    Continue metronidazole 0.75% gel once daily    Finacea once daily or every other day if  Too drying    Use Vanicream several timesfor face once daily      Follow-up:   Follow-up with dermatology in approximately 8 weeks photos and phone. Hold creams if too drying and contact Dr. Payan, she understands.      Staff only    Denise Payan MD    Department of Dermatology  [facility]: Phone: [phone], Fax:[phone]  [facility]: Phone: [phone], Fax: [phone]        _____________________________________________________________________________    Dermatology Problem List:  1. Rosacea  - Prev response to metronidazole 0.75% gel BID  - Current t/x: Doxy 100 mg, metronidazole 0.75% gel  2. Hx of borderline ABRAHAN       Encounter Date: Jul 21, 2020    CC:   Chief Complaint   Patient presents with     Derm Problem     Rosacea f/u              History of Present Illness:  I have reviewed the teledermatology information and the nursing intake corresponding to this issue. Jo Ann Patel is a 35 year old female who presents via teledermatology for acne rosacea, is using metrogel for 2  Years now. Pork or spicy food makes it worse. Still red on  cheeks and chin. Using metrogel twice daily. No on doxycycline anymore.     She feels that she feels more rashes on the chin. She describes these rashes as little pimples.       ROS: Patient is generally feeling well today  She is breast feeding    Physical Examination:  General: Well-sounding  Skin: Focused examination within the teledermatology photograph(s) including   was performed. No all photos focused  -redness on the right cheek, appears patchy  -possibly xerosis, right chin  Labs:  NA    Past Medical History:   Patient Active Problem List   Diagnosis     H. pylori infection     Gastroesophageal reflux disease, esophagitis presence not specified     Hepatic steatosis     Non-ulcer dyspepsia     Gallbladder sludge     Itchy eyes     Allergic conjunctivitis, right     Acne rosacea     Flu vaccine need     Patient desires vaginal birth after  section ()     Past Medical History:   Diagnosis Date     Hepatic steatosis 2019     Past Surgical History:   Procedure Laterality Date     APPENDECTOMY        SECTION N/A 7/3/2017    Procedure:  SECTION;  Primary  Section ;  Surgeon: Luz Maria Kwna MD;  [facility]     HC INSERTION INTRAUTERINE DEVICE       HC REMOVE INTRAUTERINE DEVICE         Social History:  Patient reports that she has never smoked. She has never used smokeless tobacco. She reports that she does not drink alcohol or use drugs.    Family History:  Family History   Problem Relation Age of Onset     Cerebrovascular Disease Maternal Grandfather      Heart Disease Maternal Grandfather      Other - See Comments Daughter         Hydrocephaly       Medications:  Current Outpatient Medications   Medication     acetaminophen (TYLENOL) 325 MG tablet     albuterol (PROAIR HFA/PROVENTIL HFA/VENTOLIN HFA) 108 (90 Base) MCG/ACT inhaler     Prenatal Vit-Fe Fumarate-FA (PRENATAL VITAMINS PO)     No current facility-administered medications for this  "visit.           No Known Allergies      _____________________________________________________________________________    Teledermatology information:  - Location of patient: [place]  - Patient presented as: return  - Location of teledermatologist:  [facility] )  - Reason teledermatology is appropriate:  of National Emergency Regarding Coronavirus disease (COVID 19) Outbreak  - Image quality and interpretability: limited  - Physician has received verbal consent for a Video/Photos Visit from the patient? Yes  - In-person dermatology visit recommendation: no  - Date of images: 7/21/2020  - Service start time:12:29pm  - Service end time:12:40pm  - Date of report: 7/21/2020         Teledermatology Nurse Call for RETURN patients seen within the last 3 years:      The patient was contacted by phone and we reviewed they have a visit in teledermatology upcoming with an MD or PABRANT;  Importantly, \"a teledermatology visit may not be as thorough as an in-person visit, and the quality of the photograph and/or video sent may not be of the same quality as that taken by the dermatology clinic.\"     We have found that certain health care needs can be provided without the need for an in-person physical exam.   If a prescription is necessary we can send it directly to your pharmacy.  If lab work is needed we can place an order for that and you can then stop by our lab to have the test done at a later time.If during the course of the call the physician/provider feels a video visit is not appropriate, you will not be charged for this service.Visits are billed at different rates depending on your insurance coverage. Please reach out to your insurance provider with any questions.    The patient chose to:                                                                                                                                                                                                                  "   Consent to a teledermatology visit with [employer] photos. The patient understood they must upload a [employer] photo for this visit to be completed. They indicated that the photo will be taken at their home address(if other address please document here). Patient told nursing these are already uploaded .  The patient was instructed to take photos of all all areas of concern and all areas of any rash from near and far away.                                                                                                                                                                                                                               The patient will send photographs via [employer] for review. Instructions for photography are being sent to the patient via [employer] messaging.       The patient verified the location of the photo/video visit to be [place].(The physician must be notified by nurse if the patient is not in the state of [place] during the encounter)    The patient denied skin pain, fever, mucosal symptoms(lesions, blisters, sores in the mouth, nose, eyes, or genitals)  IF PATIENT ENDORSES ANY OF THESE STOP AND PAGE ON CALL ATTENDING                                                                                                                                                                                                                   Again, thank you for allowing me to participate in the care of your patient.        Sincerely,        Denise Payan MD

## 2020-07-21 NOTE — PATIENT INSTRUCTIONS
[facility] Teledermatology Visit    Thank you for allowing us to participate in your care. Your findings, instructions and follow-up plan are as follows:  Start azelaic acid once daily to the face  When should I call my doctor?    If you are worsening or not improving, please, contact us or seek urgent care as noted below.     Who should I call with questions (adults)?    [facility] (adult and pediatric): [phone]     [facility] (adult): [phone]    For urgent needs outside of business hours call the [facility] at [phone] and ask for the dermatology resident on call    If this is a medical emergency and you are unable to reach an ER, Call 211      Who should I call with questions (pediatric)?  [facility]- Pediatric Dermatology  Dr. Cindi Cummings, Dr. Flora Carter, Dr. Reva Hanley, Ramona Carternhmejia, PA  Dr. Ana London, Dr. Sangita Worthy & Dr. Chemo Guzman  Non Urgent  Nurse Triage Line; [phone]- Kendal and Apurva RN Care Coordinators   Charley (/Complex ) [phone]    If you need a prescription refill, please contact your pharmacy. Refills are approved or denied by our Physicians during normal business hours, Monday through Fridays  Per office policy, refills will not be granted if you have not been seen within the past year (or sooner depending on your child's condition)    Scheduling Information:  Pediatric Appointment Scheduling and Call Center [phone]  Radiology Scheduling- [phone]  Sedation Unit Scheduling- [phone]  [place] Scheduling- General [phone]; Pediatric Dermatology [phone]  Main  Services: [phone]  Tamazight: [phone]  Lao: [phone]  Hmong/Wolof/José: [phone]  Preadmission Nursing Department Fax Number: [phone] (Fax all pre-operative paperwork to this number)    For  urgent matters arising during evenings, weekends, or holidays that cannot wait for normal business hours please call [phone] and ask for the Dermatology Resident On-Call to be paged.

## 2020-07-23 ENCOUNTER — TELEPHONE (OUTPATIENT)
Dept: DERMATOLOGY | Facility: CLINIC | Age: 35
End: 2020-07-23

## 2020-07-23 NOTE — TELEPHONE ENCOUNTER
7/23 Provided phone number [phone] to schedule telephone visit in about 8 weeks (around 9/15/2020.    Rocío Woodward   Procedure    Ortho/Sports Med/Pod/Ent/Eye/Surgical Specialties  [facility] [place]   [phone]

## 2020-07-28 ENCOUNTER — PRENATAL OFFICE VISIT (OUTPATIENT)
Dept: OBGYN | Facility: CLINIC | Age: 35
End: 2020-07-28
Payer: COMMERCIAL

## 2020-07-28 VITALS
SYSTOLIC BLOOD PRESSURE: 105 MMHG | BODY MASS INDEX: 27 KG/M2 | HEART RATE: 83 BPM | WEIGHT: 133.7 LBS | DIASTOLIC BLOOD PRESSURE: 67 MMHG

## 2020-07-28 PROCEDURE — G0476 HPV COMBO ASSAY CA SCREEN: HCPCS | Performed by: OBSTETRICS & GYNECOLOGY

## 2020-07-28 PROCEDURE — G0145 SCR C/V CYTO,THINLAYER,RESCR: HCPCS | Performed by: OBSTETRICS & GYNECOLOGY

## 2020-07-28 PROCEDURE — 87624 HPV HI-RISK TYP POOLED RSLT: CPT | Performed by: OBSTETRICS & GYNECOLOGY

## 2020-07-28 PROCEDURE — 99207 ZZC POST PARTUM EXAM: CPT | Performed by: OBSTETRICS & GYNECOLOGY

## 2020-07-28 NOTE — PATIENT INSTRUCTIONS
If you have any questions regarding your visit, Please contact your care team.  [employer]: [phone]  [facility] CLINIC HOURS TELEPHONE NUMBER   Cephas Agbeh, M.D.      [employer]-  Carly-         Monday-Ino    8:00a.m-4:45 p.m    Tuesday--[place]     8:00a.m-4:45 p.m.    Thursday-Ino    8:00a.m-4:45 p.m.    Friday-Ino    8:00a.m-4:45 p.m    [facility]   [address] [place] [address]   [phone]-Ask for [facility]   Fax [phone]   Ncnbcfk-[phone]     [facility]   [address] [place] [address] [place] [address]   [phone]    [facility]  [address] [facility] [address]  [phone]  Kcewoye-[phone]   Urgent Care locations:    [facility] Monday-Friday  5 pm - 9 pm  Saturday and Sunday   9 am - 5 pm   Monday-Friday   5 pm - 9 pm  Saturday and Sunday  9 am - 5 pm    [phone]   If you need a medication refill, please contact your pharmacy. Please allow 3 business days for your refill to be completed.  As always, Thank you for trusting us with your healthcare needs!

## 2020-07-28 NOTE — LETTER
August 7, 2020    Jo Ann Patel  [address]   [place] [facility] [place] [facility] [address]    Dear Ms.Manzano Patel,  This letter is regarding your recent Pap smear (cervical cancer screening) and Human Papillomavirus (HPV) test.  We are happy to inform you that your Pap smear result is normal. Cervical cancer is closely linked with certain types of HPV. Your results showed no evidence of high-risk HPV.  We recommend you have your next PAP smear and HPV test in 5 years.  You will still need to return to the clinic every year for an annual exam and other preventive tests.  If you have additional questions regarding this result, please call our registered nurse, Alma Delia at [phone].  Sincerely,    Cephas Mawuena Agbeh, MD //moncho

## 2020-07-28 NOTE — PROGRESS NOTES
Jo Ann is here for a 6-week postpartum checkup.    She had a  of a liveborn baby girl, weight 7 pounds 9 oz.  The delivery was uncomplicated.  Since delivery, she has been breast feeding.  She has not had a normal menses.  She has not had intercourse.  Patient screened for postpartum depression and complaints are NEGATIVE. Screening has also been completed for intimate partner violence.    Her last pap was 17 and was Normal    EXAM: /67   Pulse 83   Wt 60.6 kg (133 lb 11.2 oz)   LMP 2019 (Exact Date)   Breastfeeding Yes   BMI 27.00 kg/m      HEENT: grossly normal.  NECK: no lymphadenopathy or thyromegaly.  ABDOMEN: soft, non tender, good bowel sounds, without masses, rebound, guarding or tenderness.    EXTREMITIES: Warm to touch, no ankle edema or calf tenderness.    PELVIC:    External genitalia: normal without lesion,  perineum well healed   Vagina: normal mucosa and rugae, normal discharge.  Cervix: normal without lesion.  Uterus: small, mobile, nontender.  Adnexa: No masses, No tenderness  Rectal: deferred, external hemorrhoids absent  Nurse for exam  A/P  Routine Postpartum    ICD-10-CM    1. Routine postpartum follow-up  Z39.2 Pap imaged thin layer screen with HPV - recommended age 30 - 65 years (select HPV order below)     HPV High Risk Types DNA Cervical       1. Contraception: condom  2. Annual due in  every 12 months    CEPHAS AGBEH, MD.

## 2020-07-31 LAB
COPATH REPORT: NORMAL
PAP: NORMAL

## 2020-08-03 LAB
FINAL DIAGNOSIS: NORMAL
HPV HR 12 DNA CVX QL NAA+PROBE: NEGATIVE
HPV16 DNA SPEC QL NAA+PROBE: NEGATIVE
HPV18 DNA SPEC QL NAA+PROBE: NEGATIVE
SPECIMEN DESCRIPTION: NORMAL
SPECIMEN SOURCE CVX/VAG CYTO: NORMAL

## 2020-08-18 ENCOUNTER — APPOINTMENT (OUTPATIENT)
Dept: INTERPRETER SERVICES | Facility: CLINIC | Age: 35
End: 2020-08-18
Payer: COMMERCIAL

## 2020-09-15 ENCOUNTER — VIRTUAL VISIT (OUTPATIENT)
Dept: DERMATOLOGY | Facility: CLINIC | Age: 35
End: 2020-09-15
Payer: COMMERCIAL

## 2020-09-15 ENCOUNTER — MYC MEDICAL ADVICE (OUTPATIENT)
Dept: DERMATOLOGY | Facility: CLINIC | Age: 35
End: 2020-09-15

## 2020-09-15 DIAGNOSIS — L70.0 ACNE VULGARIS: Primary | ICD-10-CM

## 2020-09-15 PROCEDURE — T1013 SIGN LANG/ORAL INTERPRETER: HCPCS | Mod: U3

## 2020-09-15 PROCEDURE — 99213 OFFICE O/P EST LOW 20 MIN: CPT | Mod: 95 | Performed by: DERMATOLOGY

## 2020-09-15 ASSESSMENT — PAIN SCALES - GENERAL: PAINLEVEL: NO PAIN (0)

## 2020-09-15 NOTE — PATIENT INSTRUCTIONS
[facility] Dermatology Visit    Thank you for allowing us to participate in your care.   Use vanicream only  When should I call my doctor?    If you are worsening or not improving, please, contact us or seek urgent care as noted below.     Who should I call with questions (adults)?    [facility] (adult and pediatric): [phone]     [facility] (adult): [phone]    For urgent needs outside of business hours call the [facility] at [phone] and ask for the dermatology resident on call    If this is a medical emergency and you are unable to reach an ER, Call 911      Who should I call with questions (pediatric)?  [facility]- Pediatric Dermatology  Dr. Cindi Cummings, Dr. Flora Carter, Dr. Reva Hanley, Ramona Francois, ION London, Dr. Sangita Worthy & Dr. Chemo Guzman  Non Urgent  Nurse Triage Line; [phone]- Kendal and Apurva [employer] Care Coordinators   Charley (/Complex ) [phone]    If you need a prescription refill, please contact your pharmacy. Refills are approved or denied by our Physicians during normal business hours, Monday through Fridays  Per office policy, refills will not be granted if you have not been seen within the past year (or sooner depending on your child's condition)    Scheduling Information:  Pediatric Appointment Scheduling and Call Center [phone]  Radiology Scheduling- [phone]  Sedation Unit Scheduling- [phone]  [place] Scheduling- General [phone]; Pediatric Dermatology [phone]  Main  Services: [phone]  [place] citizen: [phone]  [place] Islander: [phone]  Hmong/Mahad/José: [phone]  Preadmission Nursing Department Fax Number: [phone] (Fax all pre-operative paperwork to this number)    For urgent matters arising during evenings, weekends, or holidays that cannot wait for normal  business hours please call [phone] and ask for the Dermatology Resident On-Call to be paged.

## 2020-09-15 NOTE — PROGRESS NOTES
" [facility] Record:  Store and Forward and Telephone [phone]  CALL Phone Interp at [phone] option 1 for Yakut       Dermatology Problem List:  1. Rosacea  - Prev response to metronidazole 0.75% gel BID  - Current t/x: Doxy 100 mg, metronidazole 0.75% gel  2. Hx of borderline ABRAHAN    Encounter Date: Sep 15, 2020    CC:   Chief Complaint   Patient presents with     Derm Problem     Rosacea     Patient voices \"pores are opening\". Areas still red, noticed some white spots on top of Rosacea about a week ago. No changes or much improvement of Rosaea since last visit. Facial dryness. She is using Metronidazole 0.75% gel once daily, Azelaic acid nightly and Vanicream using everyday.   She reported discontinued Azelaic acide for 2 weeks and restarted yesterday.         History of Present Illness:  Jo Ann Patel is a 35 year old female who presents for rosacea. Reports redness is the same. The facial dryness more bothersome and still causing problems. Using all creams.     Yakut interpretor on phone  ROS:  not pre or BF    Physical Examination:  General: Well-appearing appropriately-developed individual.  Skin: Focused examination including face was performed.   -comedones on forheead  -erythema and xerosis hard to appreciate    Labs:  NA    Past Medical History:   Patient Active Problem List   Diagnosis     H. pylori infection     Gastroesophageal reflux disease, esophagitis presence not specified     Hepatic steatosis     Non-ulcer dyspepsia     Gallbladder sludge     Itchy eyes     Allergic conjunctivitis, right     Acne rosacea     Flu vaccine need     Patient desires vaginal birth after  section ()     Past Medical History:   Diagnosis Date     Hepatic steatosis 2019     Past Surgical History:   Procedure Laterality Date     APPENDECTOMY        SECTION N/A 7/3/2017    Procedure:  SECTION;  Primary  Section ;  Surgeon: Luz Maria Kwan MD;  " [facility]     HC INSERTION INTRAUTERINE DEVICE  2008     HC REMOVE INTRAUTERINE DEVICE  2009       Social History:  Patient reports that she has never smoked. She has never used smokeless tobacco. She reports that she does not drink alcohol or use drugs.    Family History:  Family History   Problem Relation Age of Onset     Cerebrovascular Disease Maternal Grandfather      Heart Disease Maternal Grandfather      Other - See Comments Daughter         Hydrocephaly       Medications:  Current Outpatient Medications   Medication     acetaminophen (TYLENOL) 325 MG tablet     albuterol (PROAIR HFA/PROVENTIL HFA/VENTOLIN HFA) 108 (90 Base) MCG/ACT inhaler     azelaic acid (FINACIA) 15 % external gel     metroNIDAZOLE (METROGEL) 0.75 % external gel     Prenatal Vit-Fe Fumarate-FA (PRENATAL VITAMINS PO)     No current facility-administered medications for this visit.           No Known Allergies        Impression and Recommendations (Patient Counseled on the Following):  Facial redness and dryness reported by patient. Images with with more acne appearance on forehead. THis history and exam should be reconciled with an in person visit to move forward. Until then, we will move back to gentle skin care which should improve this and she will call if she is worsening.     Hold finacea and metro    Use Vanicream only to face    Refuses trial of another topical at this time      Follow-up:   Within 4 weeks in person assessment any derm     Staff only    Denise Payan MD    Department of Dermatology  [facility]: Phone: [phone], Fax:[phone]  [facility]: Phone: [phone], Fax: [phone]      _____________________________________________________________________________    Teledermatology information:  - Location of patient: [place]  - Patient presented as: return  - Location of  "teledermatologist:  ([facility] )  - Reason teledermatology is appropriate:  patient unable to obtain appointment for acute issue (next clinic opening too far away)    - Image quality and interpretability: acceptable  - Physician has received verbal consent for a Video/Photos Visit from the patient? Yes  - In-person dermatology visit recommendation: yes - for physician visit  - Date of images: today  - approx 7 minutes  - Date of report: 9/15/2020         Teledermatology Nurse Call for RETURN patients seen within the last 3 years:      The patient was contacted by phone and we reviewed they have a visit in teledermatology upcoming with an MD or PABRANT;  Importantly, \"a teledermatology visit may not be as thorough as an in-person visit, and the quality of the photograph and/or video sent may not be of the same quality as that taken by the dermatology clinic.\"     We have found that certain health care needs can be provided without the need for an in-person physical exam.   If a prescription is necessary we can send it directly to your pharmacy.  If lab work is needed we can place an order for that and you can then stop by our lab to have the test done at a later time.Visits are billed at different rates depending on your insurance coverage. Please reach out to your insurance provider with any questions.    The patient chose to:                                                                                                                                                                                                                 Consent to a teledermatology visit with [employer] photos. Patient told by nursing these are already uploaded. Instructions sent to patient via [employer]. They verified they will be in the state of [place] at the time of the encounter.                                                                                                                                                        "                                                                              The patient denied skin pain, fever, mucosal symptoms(lesions, blisters, sores in the mouth, nose, eyes, or genitals)  IF PATIENT ENDORSES ANY OF THESE STOP AND PAGE ON CALL ATTENDING

## 2020-09-15 NOTE — LETTER
"    9/15/2020         RE: Jo Ann Patel  [address]  [facility] [address]        Dear Colleague,    Thank you for referring your patient, Jo Ann Patel, to the [facility]. Please see a copy of my visit note below.    [facility] Dermatology Record:  Store and Forward and Telephone [phone]  CALL Phone Interp at [phone] option 1 for Lithuanian       Dermatology Problem List:  1. Rosacea  - Prev response to metronidazole 0.75% gel BID  - Current t/x: Doxy 100 mg, metronidazole 0.75% gel  2. Hx of borderline ABRAHAN    Encounter Date: Sep 15, 2020    CC:   Chief Complaint   Patient presents with     Derm Problem     Rosacea     Patient voices \"pores are opening\". Areas still red, noticed some white spots on top of Rosacea about a week ago. No changes or much improvement of Rosaea since last visit. Facial dryness. She is using Metronidazole 0.75% gel once daily, Azelaic acid nightly and Vanicream using everyday.   She reported discontinued Azelaic acide for 2 weeks and restarted yesterday.         History of Present Illness:  Jo Ann Patel is a 35 year old female who presents for rosacea. Reports redness is the same. The facial dryness more bothersome and still causing problems. Using all creams.     Lithuanian interpretor on phone  ROS:  not pre or BF    Physical Examination:  General: Well-appearing appropriately-developed individual.  Skin: Focused examination including face was performed.   -comedones on forheead  -erythema and xerosis hard to appreciate    Labs:  NA    Past Medical History:   Patient Active Problem List   Diagnosis     H. pylori infection     Gastroesophageal reflux disease, esophagitis presence not specified     Hepatic steatosis     Non-ulcer dyspepsia     Gallbladder sludge     Itchy eyes     Allergic conjunctivitis, right     Acne rosacea     Flu vaccine need     Patient desires vaginal birth after  section ()     Past " Medical History:   Diagnosis Date     Hepatic steatosis 2019     Past Surgical History:   Procedure Laterality Date     APPENDECTOMY        SECTION N/A 7/3/2017    Procedure:  SECTION;  Primary  Section ;  Surgeon: Luz Maria Kwan MD;  [facility]     HC INSERTION INTRAUTERINE DEVICE       HC REMOVE INTRAUTERINE DEVICE         Social History:  Patient reports that she has never smoked. She has never used smokeless tobacco. She reports that she does not drink alcohol or use drugs.    Family History:  Family History   Problem Relation Age of Onset     Cerebrovascular Disease Maternal Grandfather      Heart Disease Maternal Grandfather      Other - See Comments Daughter         Hydrocephaly       Medications:  Current Outpatient Medications   Medication     acetaminophen (TYLENOL) 325 MG tablet     albuterol (PROAIR HFA/PROVENTIL HFA/VENTOLIN HFA) 108 (90 Base) MCG/ACT inhaler     azelaic acid (FINACIA) 15 % external gel     metroNIDAZOLE (METROGEL) 0.75 % external gel     Prenatal Vit-Fe Fumarate-FA (PRENATAL VITAMINS PO)     No current facility-administered medications for this visit.           No Known Allergies        Impression and Recommendations (Patient Counseled on the Following):  Facial redness and dryness reported by patient. Images with with more acne appearance on forehead. THis history and exam should be reconciled with an in person visit to move forward. Until then, we will move back to gentle skin care which should improve this and she will call if she is worsening.     Hold finacea and metro    Use Vanicream only to face    Refuses trial of another topical at this time      Follow-up:   Within 4 weeks in person assessment any derm     Staff only    Denise Payan MD    Department of Dermatology  [facility]: Phone: [phone], Fax:[phone]  [facility]  "[facility]: Phone: [phone], Fax: [phone]      _____________________________________________________________________________    Teledermatology information:  - Location of patient: [place]  - Patient presented as: return  - Location of teledermatologist:  ([facility] )  - Reason teledermatology is appropriate:  patient unable to obtain appointment for acute issue (next clinic opening too far away)    - Image quality and interpretability: acceptable  - Physician has received verbal consent for a Video/Photos Visit from the patient? Yes  - In-person dermatology visit recommendation: yes - for physician visit  - Date of images: today  - approx 7 minutes  - Date of report: 9/15/2020         Teledermatology Nurse Call for RETURN patients seen within the last 3 years:      The patient was contacted by phone and we reviewed they have a visit in teledermatology upcoming with an MD or PA-C;  Importantly, \"a teledermatology visit may not be as thorough as an in-person visit, and the quality of the photograph and/or video sent may not be of the same quality as that taken by the dermatology clinic.\"     We have found that certain health care needs can be provided without the need for an in-person physical exam.   If a prescription is necessary we can send it directly to your pharmacy.  If lab work is needed we can place an order for that and you can then stop by our lab to have the test done at a later time.Visits are billed at different rates depending on your insurance coverage. Please reach out to your insurance provider with any questions.    The patient chose to:                                                                                                                                                                                                                 Consent to a teledermatology visit with mychart photos. Patient told by nursing these are " already uploaded. Instructions sent to patient via [employer]. They verified they will be in the state of [place] at the time of the encounter.                                                                                                                                                                                                                                     The patient denied skin pain, fever, mucosal symptoms(lesions, blisters, sores in the mouth, nose, eyes, or genitals)  IF PATIENT ENDORSES ANY OF THESE STOP AND PAGE ON CALL ATTENDING                                                                                                                                                                                                                                 Again, thank you for allowing me to participate in the care of your patient.        Sincerely,        Denise Payan MD

## 2020-09-24 ENCOUNTER — APPOINTMENT (OUTPATIENT)
Dept: OPTOMETRY | Facility: CLINIC | Age: 35
End: 2020-09-24
Payer: COMMERCIAL

## 2020-09-24 ENCOUNTER — OFFICE VISIT (OUTPATIENT)
Dept: OPTOMETRY | Facility: CLINIC | Age: 35
End: 2020-09-24
Payer: COMMERCIAL

## 2020-09-24 DIAGNOSIS — H52.12 MYOPIA OF LEFT EYE: ICD-10-CM

## 2020-09-24 DIAGNOSIS — Z01.00 EXAMINATION OF EYES AND VISION: Primary | ICD-10-CM

## 2020-09-24 DIAGNOSIS — H52.221 REGULAR ASTIGMATISM OF RIGHT EYE: ICD-10-CM

## 2020-09-24 DIAGNOSIS — H02.889 MEIBOMIAN GLAND DYSFUNCTION: ICD-10-CM

## 2020-09-24 PROCEDURE — V2100 LENS SPHER SINGLE PLANO 4.00: HCPCS | Mod: LT | Performed by: OPTOMETRIST

## 2020-09-24 PROCEDURE — 92015 DETERMINE REFRACTIVE STATE: CPT | Performed by: OPTOMETRIST

## 2020-09-24 PROCEDURE — 92004 COMPRE OPH EXAM NEW PT 1/>: CPT | Performed by: OPTOMETRIST

## 2020-09-24 PROCEDURE — V2020 VISION SVCS FRAMES PURCHASES: HCPCS | Performed by: OPTOMETRIST

## 2020-09-24 RX ORDER — POLYETHYLENE GLYCOL 400 AND PROPYLENE GLYCOL 4; 3 MG/ML; MG/ML
1 SOLUTION/ DROPS OPHTHALMIC 4 TIMES DAILY
Qty: 6 ML | Refills: 12 | Status: SHIPPED | OUTPATIENT
Start: 2020-09-24 | End: 2020-12-04

## 2020-09-24 ASSESSMENT — CUP TO DISC RATIO
OD_RATIO: 0.45
OS_RATIO: 0.3

## 2020-09-24 ASSESSMENT — SLIT LAMP EXAM - LIDS
COMMENTS: MEIBOMIAN GLAND DYSFUNCTION
COMMENTS: MEIBOMIAN GLAND DYSFUNCTION

## 2020-09-24 ASSESSMENT — VISUAL ACUITY
OS_SC: 20/20
OS_SC: 20/20
OD_SC: 20/20
METHOD: SNELLEN - LINEAR
OD_SC: 20/20

## 2020-09-24 ASSESSMENT — TONOMETRY
IOP_METHOD: TONOPEN
OD_IOP_MMHG: 22
OS_IOP_MMHG: 22

## 2020-09-24 ASSESSMENT — REFRACTION_MANIFEST
OS_SPHERE: -0.25
OD_CYLINDER: +0.50
OD_SPHERE: -0.50
OD_AXIS: 105

## 2020-09-24 ASSESSMENT — EXTERNAL EXAM - LEFT EYE: OS_EXAM: NORMAL

## 2020-09-24 ASSESSMENT — CONF VISUAL FIELD
OD_NORMAL: 1
OS_NORMAL: 1

## 2020-09-24 ASSESSMENT — EXTERNAL EXAM - RIGHT EYE: OD_EXAM: NORMAL

## 2020-09-24 NOTE — LETTER
9/24/2020         RE: Jo Ann Patel  [address]  [facility] [facility] [address]        Dear Colleague,    Thank you for referring your patient, Jo Ann Patel, to the [facility]. Please see a copy of my visit note below.    Chief Complaint   Patient presents with     Annual Eye Exam      Accompanied by daughter  Last Eye Exam: 15 years  Dilated Previously: No, side effects of dilation explained today    What are you currently using to see?  does not use glasses or contacts       Distance Vision Acuity: Satisfied with vision    Near Vision Acuity: Not satisfied     Eye Comfort: tired little bump on od eye lower eyelid  Do you use eye drops? 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          Medical, surgical and family histories reviewed and updated 9/24/2020.       OBJECTIVE: See Ophthalmology exam    ASSESSMENT:    ICD-10-CM    1. Examination of eyes and vision  Z01.00 EYE EXAM (SIMPLE-NONBILLABLE)   2. Regular astigmatism of right eye  H52.221 REFRACTION   3. Myopia of left eye  H52.12 REFRACTION   4. Meibomian gland dysfunction  H02.889 EYE EXAM (SIMPLE-NONBILLABLE)     polyethylene glycol-propylene glycol (SYSTANE ULTRA) 0.4-0.3 % SOLN ophthalmic solution      PLAN:     Patient Instructions   Eyeglass prescription given.  Optional glasses as needed.    Heat to the eyes daily for 10-15 minutes nightly with warm washcloth or reusable gel masks from the pharmacy or  Mtime heat masks can be purchased at [employer].    Ocusoft lid scrubs at night and in the am.    Systane Ultra 1 drop both eyes 2-4 x day.    Return in 1 year for a complete eye exam or sooner if needed.    Luis Iniguez, OD           Again, thank you for allowing me to participate in the care of your patient.        Sincerely,        Luis Iniguez, OD

## 2020-09-24 NOTE — PATIENT INSTRUCTIONS
Eyeglass prescription given.  Optional glasses as needed.    Heat to the eyes daily for 10-15 minutes nightly with warm washcloth or reusable gel masks from the pharmacy or  TAPQUAD heat masks can be purchased at [employer].    Ocusoft lid scrubs at night and in the am.    Systane Ultra 1 drop both eyes 2-4 x day.    Return in 1 year for a complete eye exam or sooner if needed.    Luis Iniguez, ANDREY    The affects of the dilating drops last for 4- 6 hours.  You will be more sensitive to light and vision will be blurry up close.  Do not drive if you do not feel comfortable.  Mydriatic sunglasses were given if needed.      Optometry Providers       Clinic Locations                                 Telephone Number   Dr. Sierra Zambrano [phone]     [facility] Hours:                [facility] Hours:       [facility] Hours:   [address]   [address] [facility]     [address] [facility] [facility]  [place] [address]   [place] [address]    [place] [address]  Phone: [phone]                    Phone: [phone]     Phone: [phone]                      Monday 8:00-7:00                          Monday 8:00-7:00                          Monday 8:00-7:00              Tuesday 8:00-6:00                          Tuesday 8:00-7:00                          Tuesday 8:00-7:00              Wednesday 8:00-6:00                  Wednesday 8:00-7:00                   Wednesday 8:00-7:00      Thursday 8:00-6:00                        Thursday 8:00-7:00                         Thursday 8:00-7:00            Friday 8:00-5:00                              Friday 8:00-5:00                              Friday 8:00-5:00    [facility] Hours:   [address] [place] [id number] [phone]    Monday 8:00-7:00  Tuesday 8:00-7:00  Wednesday 8:00-7:00  Thursday 8:00-7:00  Friday 8:00-5:00  Please log on to [email or web address]  to order your contact lenses.  The link is found on the [facility] Care [facility] Vision Services page.  As always, Thank you for trusting us with your health care needs!

## 2020-09-24 NOTE — PROGRESS NOTES
Chief Complaint   Patient presents with     Annual Eye Exam      Accompanied by daughter  Last Eye Exam: 15 years  Dilated Previously: No, side effects of dilation explained today    What are you currently using to see?  does not use glasses or contacts       Distance Vision Acuity: Satisfied with vision    Near Vision Acuity: Not satisfied     Eye Comfort: tired little bump on od eye lower eyelid  Do you use eye drops? 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          Medical, surgical and family histories reviewed and updated 9/24/2020.       OBJECTIVE: See Ophthalmology exam    ASSESSMENT:    ICD-10-CM    1. Examination of eyes and vision  Z01.00 EYE EXAM (SIMPLE-NONBILLABLE)   2. Regular astigmatism of right eye  H52.221 REFRACTION   3. Myopia of left eye  H52.12 REFRACTION   4. Meibomian gland dysfunction  H02.889 EYE EXAM (SIMPLE-NONBILLABLE)     polyethylene glycol-propylene glycol (SYSTANE ULTRA) 0.4-0.3 % SOLN ophthalmic solution      PLAN:     Patient Instructions   Eyeglass prescription given.  Optional glasses as needed.    Heat to the eyes daily for 10-15 minutes nightly with warm washcloth or reusable gel masks from the pharmacy or  140Fire heat masks can be purchased at Amazon.    Ocusoft lid scrubs at night and in the am.    Systane Ultra 1 drop both eyes 2-4 x day.    Return in 1 year for a complete eye exam or sooner if needed.    Luis Iniguez, OD

## 2020-09-25 ENCOUNTER — TELEPHONE (OUTPATIENT)
Dept: DERMATOLOGY | Facility: CLINIC | Age: 35
End: 2020-09-25

## 2020-09-25 NOTE — TELEPHONE ENCOUNTER
9/25 Provided phone number [phone] to schedule follow up in person in about 4 weeks (around 10/13/2020.    Rocío Woodward   Procedure    Ortho/Sports Med/Pod/Ent/Eye/Surgical Specialties  [facility]   [phone]

## 2020-10-06 ENCOUNTER — APPOINTMENT (OUTPATIENT)
Dept: OPTOMETRY | Facility: CLINIC | Age: 35
End: 2020-10-06
Payer: COMMERCIAL

## 2020-10-06 PROCEDURE — 92340 FIT SPECTACLES MONOFOCAL: CPT | Performed by: OPTOMETRIST

## 2020-10-09 NOTE — TELEPHONE ENCOUNTER
10/9  3rd attempt Provided phone number [phone] to schedule follow up in person in about 4 weeks (around 10/13/2020.    Rocío Woodward   Procedure    Ortho/Sports Med/Pod/Ent/Eye/Surgical Specialties  [facility]   [phone]

## 2020-10-28 NOTE — TELEPHONE ENCOUNTER
Patient requesting call back to schedule in person appt. Writer only able to find December appt, patient would like sooner. Please advise.

## 2020-12-01 ENCOUNTER — APPOINTMENT (OUTPATIENT)
Dept: INTERPRETER SERVICES | Facility: CLINIC | Age: 35
End: 2020-12-01
Payer: COMMERCIAL

## 2020-12-01 ENCOUNTER — TELEPHONE (OUTPATIENT)
Dept: PEDIATRICS | Facility: CLINIC | Age: 35
End: 2020-12-01

## 2020-12-01 DIAGNOSIS — Z20.822 EXPOSURE TO COVID-19 VIRUS: Primary | ICD-10-CM

## 2020-12-01 NOTE — TELEPHONE ENCOUNTER
With , spoke to patient.  Gave her the message that the Covid test was ordered.  Her daughter was just tested this morning, her daughter's exposure was 7 days ago.    She reports having inner ear itching, and pain in the morning. Has been having symptoms for 1 month.   Her right ear is 6-7/10 and she hears her heartbeat in her ear.  She has left ear discomfort but it is 2/10.      She denies runny nose, drainage from ear, fevers.  Has slight headache on her right side by her ear.   She attributes her chills from being outside.    Discussed that since she has Covid test pending, would need to rule that out prior to being seen.   She agrees to plan.  She will try OTC pain reliever as she has not taken any for the ear discomfort.    Routing to provider to please advise on plan.    Yasmeen Fish RN, [facility]

## 2020-12-01 NOTE — TELEPHONE ENCOUNTER
I agree with waiting for the Covid test results  We can plan on doing the virtual visit first, after the Covid results are back  Please help her schedule for virtual visit this Friday

## 2020-12-01 NOTE — TELEPHONE ENCOUNTER
Health Call Center    Phone Message    May a detailed message be left on voicemail: yes     Reason for Call: Patient requesting a call back from the nurse.  Patient was exposed to someone who testing positive for covid and now has chills/sneezing wanted to know if she should get tested.  Please call patient back to discuss. Patients daughter is going in at 11am to be tested so hoping she can just get tested at the same time. Thank you.    Action Taken: Message routed to:  Primary Care p [phone]    Travel Screening: Not Applicable

## 2020-12-01 NOTE — TELEPHONE ENCOUNTER
With , called patient.   The  just spoke to the patient regarding the patient's daughter.    Patient did not answer the call after multiple attempts.  Message left with RN call back line.     Routing to provider to please advise on Covid testing.  The patient's daughter is getting tested at 11:00 am, this patient has sneezing and chills.    Routing to Dr. Wilson to please review, Covid test pended in case needed.    Yasmeen Fish RN, [facility]

## 2020-12-02 ENCOUNTER — APPOINTMENT (OUTPATIENT)
Dept: INTERPRETER SERVICES | Facility: CLINIC | Age: 35
End: 2020-12-02
Payer: COMMERCIAL

## 2020-12-04 ENCOUNTER — VIRTUAL VISIT (OUTPATIENT)
Dept: PEDIATRICS | Facility: CLINIC | Age: 35
End: 2020-12-04
Payer: COMMERCIAL

## 2020-12-04 DIAGNOSIS — J34.89 RHINORRHEA: ICD-10-CM

## 2020-12-04 DIAGNOSIS — H60.501 ACUTE OTITIS EXTERNA OF RIGHT EAR, UNSPECIFIED TYPE: Primary | ICD-10-CM

## 2020-12-04 DIAGNOSIS — R51.9 NONINTRACTABLE HEADACHE, UNSPECIFIED CHRONICITY PATTERN, UNSPECIFIED HEADACHE TYPE: ICD-10-CM

## 2020-12-04 DIAGNOSIS — L29.9 ITCHING OF EAR: ICD-10-CM

## 2020-12-04 PROCEDURE — 99213 OFFICE O/P EST LOW 20 MIN: CPT | Mod: 95 | Performed by: FAMILY MEDICINE

## 2020-12-04 NOTE — PROGRESS NOTES
"Jo Ann Patel is a 35 year old female who is being evaluated via a billable video visit.    11:40-43  The patient has been notified of following:     \"This video visit will be conducted via a call between you and your physician/provider. We have found that certain health care needs can be provided without the need for an in-person physical exam.  This service lets us provide the care you need with a video conversation.  If a prescription is necessary we can send it directly to your pharmacy.  If lab work is needed we can place an order for that and you can then stop by our lab to have the test done at a later time.    Video visits are billed at different rates depending on your insurance coverage.  Please reach out to your insurance provider with any questions.    If during the course of the call the physician/provider feels a video visit is not appropriate, you will not be charged for this service.\"    Patient has given verbal consent for Video visit? Yes  How would you like to obtain your AVS? MyChart  If you are dropped from the video visit, the video invite should be resent to: Text to cell phone: [phone]  Will anyone else be joining your video visit? No    Subjective     Jo Ann Patel is a 35 year old female who presents today via video visit for the following health issues:    Patient is here for a telephone /video visit instead of in person visit due to the current COVID-19 pandemic.  She is here today for video visit with concerns of having ongoing itching of the right ear associated with recent onset of progressively worsening pain in the right ear for the past 2 weeks, headache and runny nose for the past 3 days  Denies ear drainage, left ear symptoms, history of allergies, fever, chills, cough, shortness of breath  Patient's daughter had similar symptoms for the past 1 week, had a Covid test done that was negative as of 3 days ago  Patient denies recent sick contact exposure, " history of travel  Denies abnormal skin rashes, diarrhea, dizziness, nausea, vomiting  Headache is mild, intermittent, not limiting her routine activities  denies neck stiffness, neck pain, history of migraines      HPI     Acute Illness  Acute illness concerns: ear pain  Onset/Duration: on going issue  Symptoms:  Fever: no  Chills/Sweats: no  Headache (location?): YES  Sinus Pressure: no  Conjunctivitis:  no  Ear Pain: YES: right, itchiness and pain  Rhinorrhea: no  Congestion: YES  Sore Throat: no  Cough: no  Wheeze: no  Decreased Appetite: no  Nausea: no  Vomiting: no  Diarrhea: no  Dysuria/Freq.: no  Dysuria or Hematuria: no  Fatigue/Achiness: no  Sick/Strep Exposure: no  Therapies tried and outcome: tylenol       Video Start Time: 11;40am        Review of Systems   CONSTITUTIONAL: NEGATIVE for fever, chills, change in weight  INTEGUMENTARY/SKIN: NEGATIVE for worrisome rashes, moles or lesions  EYES: NEGATIVE for vision changes or irritation  ENT/MOUTH: as above  RESP: NEGATIVE for significant cough or SOB  CV: NEGATIVE for chest pain, palpitations or peripheral edema  GI: NEGATIVE for nausea, abdominal pain, heartburn, or change in bowel habits  MUSCULOSKELETAL: NEGATIVE for significant arthralgias or myalgia  NEURO: Headache  PSYCHIATRIC: NEGATIVE for changes in mood or affect      Objective           Vitals:  No vitals were obtained today due to virtual visit.    Physical Exam     GENERAL: Healthy, alert and no distress  EYES: Eyes grossly normal to inspection  HENT: normal cephalic/atraumatic and right ear: Visible exam-no erythema, swelling noted  RESP: No audible wheeze, cough, or visible cyanosis.  No visible retractions or increased work of breathing.    SKIN: Visible skin clear. No significant rash, abnormal pigmentation or lesions.  PSYCH: Mentation appears normal, affect normal/bright, judgement and insight intact, normal speech and appearance well-groomed.              Assessment & Plan     Acute  "otitis externa of right ear, unspecified type  Likely causing her acute symptoms  Recommended to start on Ciprodex eardrops  If symptoms are not any better in 1 week, patient understands to follow-up in the clinic for further evaluation  - ciprofloxacin-hydrocortisone (CIPRO HC OTIC) 0.2-1 % otic suspension; Place 3 drops into the right ear 2 times daily    Rhinorrhea  Given the new onset of headache and rhinorrhea with the current pandemic, recommended to proceed with Covid testing done  Patient verbalised understanding and is agreeable to the plan.    - Symptomatic COVID-19 Virus (Coronavirus) by PCR; Future    Nonintractable headache, unspecified chronicity pattern, unspecified headache type  as above    - Symptomatic COVID-19 Virus (Coronavirus) by PCR; Future    Itching of ear  Chronic  Recommended to avoid Q-tips, if no symptom improvement noted after using the eardrops as mentioned above, patient understands to call to schedule for ENT consult for further evaluation  - OTOLARYNGOLOGY REFERRAL     BMI:   Estimated body mass index is 27 kg/m  as calculated from the following:    Height as of 4/22/20: 1.499 m (4' 11\").    Weight as of 7/28/20: 60.6 kg (133 lb 11.2 oz).            Chart documentation done in part with Dragon Voice recognition Software. Although reviewed after completion, some word and grammatical error may remain.    See Patient Instructions    No follow-ups on file.    Robson Wilson MD  [facility]      Video-Visit Details    We had a conference call with the  for the first 10 minutes of the visit  Patient had to finish the telephone conference call before proceeding with a video visit due to restrictions from her phone   She was able to follow-up with recommendations as we reviewed through the video visit today  Type of service:  Video Visit    Video End Time:11;43am    Originating Location (pt. Location): Home    Distant Location (provider " location):  [facility]     Platform used for Video Visit: [employer]Well

## 2020-12-06 DIAGNOSIS — R51.9 NONINTRACTABLE HEADACHE, UNSPECIFIED CHRONICITY PATTERN, UNSPECIFIED HEADACHE TYPE: ICD-10-CM

## 2020-12-06 DIAGNOSIS — Z20.822 EXPOSURE TO COVID-19 VIRUS: ICD-10-CM

## 2020-12-06 DIAGNOSIS — J34.89 RHINORRHEA: ICD-10-CM

## 2020-12-06 PROCEDURE — U0003 INFECTIOUS AGENT DETECTION BY NUCLEIC ACID (DNA OR RNA); SEVERE ACUTE RESPIRATORY SYNDROME CORONAVIRUS 2 (SARS-COV-2) (CORONAVIRUS DISEASE [COVID-19]), AMPLIFIED PROBE TECHNIQUE, MAKING USE OF HIGH THROUGHPUT TECHNOLOGIES AS DESCRIBED BY CMS-2020-01-R: HCPCS | Performed by: FAMILY MEDICINE

## 2020-12-07 LAB
SARS-COV-2 RNA SPEC QL NAA+PROBE: NOT DETECTED
SPECIMEN SOURCE: NORMAL

## 2020-12-11 ENCOUNTER — APPOINTMENT (OUTPATIENT)
Dept: INTERPRETER SERVICES | Facility: CLINIC | Age: 35
End: 2020-12-11
Payer: COMMERCIAL

## 2020-12-14 ENCOUNTER — APPOINTMENT (OUTPATIENT)
Dept: INTERPRETER SERVICES | Facility: CLINIC | Age: 35
End: 2020-12-14
Payer: COMMERCIAL

## 2020-12-15 ENCOUNTER — VIRTUAL VISIT (OUTPATIENT)
Dept: OTOLARYNGOLOGY | Facility: CLINIC | Age: 35
End: 2020-12-15
Attending: FAMILY MEDICINE
Payer: COMMERCIAL

## 2020-12-15 DIAGNOSIS — H60.391 INFECTIVE OTITIS EXTERNA, RIGHT: Primary | ICD-10-CM

## 2020-12-15 PROCEDURE — 99203 OFFICE O/P NEW LOW 30 MIN: CPT | Mod: 95 | Performed by: OTOLARYNGOLOGY

## 2020-12-15 PROCEDURE — T1013 SIGN LANG/ORAL INTERPRETER: HCPCS | Mod: U4

## 2020-12-15 PROCEDURE — T1013 SIGN LANG/ORAL INTERPRETER: HCPCS | Mod: U3

## 2020-12-15 ASSESSMENT — ENCOUNTER SYMPTOMS
BRUISES/BLEEDS EASILY: 0
HEMOPTYSIS: 0
BLURRED VISION: 0
STRIDOR: 0
PHOTOPHOBIA: 0
SINUS PAIN: 0
DIZZINESS: 0
CONSTITUTIONAL NEGATIVE: 1
HEADACHES: 0
DOUBLE VISION: 0
NAUSEA: 0
HEARTBURN: 0
TINGLING: 0
COUGH: 0
SORE THROAT: 0

## 2020-12-15 NOTE — NURSING NOTE
"Jo Ann Patel's goals for this visit include:   Chief Complaint   Patient presents with     Ear Problem     Itching to both ears. Pain also - if phone on L ear, in R ear can hear noises like \"boom boom\". Has been going on for more than 1 year     She requests these members of her care team be copied on today's visit information:     PCP: Robson Wilson    Referring Provider:  Robson Wilson MD  [address] [place] [address]    There were no vitals taken for this visit.    Do you need any medication refills at today's visit? No    Mago Jeong LPN      "

## 2020-12-15 NOTE — PROGRESS NOTES
"Jo Ann Patel is a 35 year old female who is being evaluated via a billable video visit.      The patient has been notified of following:     \"This video visit will be conducted via a call between you and your physician/provider. We have found that certain health care needs can be provided without the need for an in-person physical exam.  This service lets us provide the care you need with a video conversation.  If a prescription is necessary we can send it directly to your pharmacy.  If lab work is needed we can place an order for that and you can then stop by our lab to have the test done at a later time.    Video visits are billed at different rates depending on your insurance coverage.  Please reach out to your insurance provider with any questions.    If during the course of the call the physician/provider feels a video visit is not appropriate, you will not be charged for this service.\"    Patient has given verbal consent for Video visit? Yes  How would you like to obtain your AVS? MyChart  If you are dropped from the video visit, the video invite should be resent to: Send to e-mail at: [email or web address]  Will anyone else be joining your video visit? No        Video-Visit Details    Type of service:  Video Visit    Video Start Time: 9:20 am  Video End Time: 9:55 am    Originating Location (pt. Location): Home    Distant Location (provider location):  [facility]     Platform used for Video Visit: Miguel Ángel Casarez MD        "

## 2020-12-15 NOTE — LETTER
"    12/15/2020         RE: Jo Ann Patel  [address]  [facility] [address]        Dear Colleague,    Thank you for referring your patient, Jo Ann Patel, to the [facility]. Please see a copy of my visit note below.    Jo Ann Patel is a 35 year old female who is being evaluated via a billable video visit.      The patient has been notified of following:     \"This video visit will be conducted via a call between you and your physician/provider. We have found that certain health care needs can be provided without the need for an in-person physical exam.  This service lets us provide the care you need with a video conversation.  If a prescription is necessary we can send it directly to your pharmacy.  If lab work is needed we can place an order for that and you can then stop by our lab to have the test done at a later time.    Video visits are billed at different rates depending on your insurance coverage.  Please reach out to your insurance provider with any questions.    If during the course of the call the physician/provider feels a video visit is not appropriate, you will not be charged for this service.\"    Patient has given verbal consent for Video visit? Yes  How would you like to obtain your AVS? MyChart  If you are dropped from the video visit, the video invite should be resent to: Send to e-mail at: [email or web address]  Will anyone else be joining your video visit? No        Video-Visit Details    Type of service:  Video Visit    Video Start Time: 9:20 am  Video End Time: 9:55 am    Originating Location (pt. Location): Home    Distant Location (provider location):  [facility]     Platform used for Video Visit: Miguel Ángel Casarez MD          HPI  This pleasant patient is having ear itching and pain in her right ear for years. Describes facial pressure, post nasal drip and vibration in her right ear " when she is exposed to a loud noise. She tried several ear drops and nasal sprays with no benefit.    Review of Systems   Constitutional: Negative.    HENT: Positive for ear pain. Negative for congestion, ear discharge, hearing loss, nosebleeds, sinus pain, sore throat and tinnitus.    Eyes: Negative for blurred vision, double vision and photophobia.   Respiratory: Negative for cough, hemoptysis and stridor.    Gastrointestinal: Negative for heartburn and nausea.   Skin: Positive for itching. Negative for rash.   Neurological: Negative for dizziness, tingling and headaches.   Endo/Heme/Allergies: Negative for environmental allergies. Does not bruise/bleed easily.         Physical Exam    This is a video-visit.    A/p  Options were discussed. She will be seen in person with a hearing test for an ear microscopic evaluation as well as to r/o any silent sinusitis. Her questions were answered.      Again, thank you for allowing me to participate in the care of your patient.        Sincerely,        Salome Casarez MD

## 2020-12-15 NOTE — PROGRESS NOTES
HPI  This pleasant patient is having ear itching and pain in her right ear for years. Describes facial pressure, post nasal drip and vibration in her right ear when she is exposed to a loud noise. She tried several ear drops and nasal sprays with no benefit.    Review of Systems   Constitutional: Negative.    HENT: Positive for ear pain. Negative for congestion, ear discharge, hearing loss, nosebleeds, sinus pain, sore throat and tinnitus.    Eyes: Negative for blurred vision, double vision and photophobia.   Respiratory: Negative for cough, hemoptysis and stridor.    Gastrointestinal: Negative for heartburn and nausea.   Skin: Positive for itching. Negative for rash.   Neurological: Negative for dizziness, tingling and headaches.   Endo/Heme/Allergies: Negative for environmental allergies. Does not bruise/bleed easily.         Physical Exam    This is a video-visit.    A/p  Options were discussed. She will be seen in person with a hearing test for an ear microscopic evaluation as well as to r/o any silent sinusitis. Her questions were answered.

## 2020-12-22 ENCOUNTER — VIRTUAL VISIT (OUTPATIENT)
Dept: DERMATOLOGY | Facility: CLINIC | Age: 35
End: 2020-12-22
Payer: COMMERCIAL

## 2020-12-22 DIAGNOSIS — L30.9 DERMATITIS: Primary | ICD-10-CM

## 2020-12-22 PROCEDURE — 99213 OFFICE O/P EST LOW 20 MIN: CPT | Mod: 95 | Performed by: DERMATOLOGY

## 2020-12-22 PROCEDURE — T1013 SIGN LANG/ORAL INTERPRETER: HCPCS | Mod: U4

## 2020-12-22 RX ORDER — HYDROCORTISONE 2.5 %
CREAM (GRAM) TOPICAL
Qty: 30 G | Refills: 1 | Status: SHIPPED | OUTPATIENT
Start: 2020-12-22 | End: 2021-01-19

## 2020-12-22 NOTE — Clinical Note
Add on in 4 weeks in person for evaluation of cheeks. Wednesday am any time. Oay to send directly in [facility]

## 2020-12-22 NOTE — LETTER
2020         RE: Jo Ann Patel  [address]  [place] [address]        Dear Colleague,    Thank you for referring your patient, Jo Ann Patel, to the [facility]. Please see a copy of my visit note below.    [facility] Dermatology Record:  Store and Forward and Telephone [phone]     Please use Ipad or call [phone] option 1 then option 1 for phone  if needed - Sinhala     Dermatology Problem List:  1. Rosacea  - Prev response to metronidazole 0.75% gel BID  - Current t/x: Doxy 100 mg, metronidazole 0.75% gel  2. Hx of borderline ABRAHAN    Encounter Date: Dec 22, 2020    CC:   Chief Complaint   Patient presents with     Derm Problem     Acne follow up        History of Present Illness:  Jo Ann Patel is a 35 year old female who presents for follow up for face. At least visit we recommended an inperson visit.However, she was rescheduled to telemedicine due to covid protocols she reports. She is bothered by the look of inflammation. No itching. Notes redness on the cheeks.     Interpretor present today(Sinhala)    ROS: Patient is generally feeling well today. She is breastfeeding, not pregnant  Physical Examination:  General: Well-appearing  appropriately-developed individual.  Skin:  Exam was performed by photo review and is significant for the following:  -mild macular erythema of the left cheek    Labs:  NA    Past Medical History:   Patient Active Problem List   Diagnosis     H. pylori infection     Gastroesophageal reflux disease, esophagitis presence not specified     Hepatic steatosis     Non-ulcer dyspepsia     Gallbladder sludge     Itchy eyes     Allergic conjunctivitis, right     Acne rosacea     Flu vaccine need     Patient desires vaginal birth after  section ()     Past Medical History:   Diagnosis Date     Hepatic steatosis 2019     Past Surgical History:   Procedure Laterality Date      APPENDECTOMY        SECTION N/A 7/3/2017    Procedure:  SECTION;  Primary  Section ;  Surgeon: Luz Maria Kwan MD;  Location: [facility]     HC INSERTION INTRAUTERINE DEVICE       HC REMOVE INTRAUTERINE DEVICE         Social History:  Patient reports that she has never smoked. She has never used smokeless tobacco. She reports that she does not drink alcohol or use drugs.    Family History:  Family History   Problem Relation Age of Onset     Cerebrovascular Disease Maternal Grandfather      Heart Disease Maternal Grandfather      Other - See Comments Daughter         Hydrocephaly       Medications:  Current Outpatient Medications   Medication     acetaminophen (TYLENOL) 325 MG tablet     ciprofloxacin-hydrocortisone (CIPRO HC OTIC) 0.2-1 % otic suspension     No current facility-administered medications for this visit.           No Known Allergies        Impression and Recommendations (Patient Counseled on the Following):  1.Facial redness and dryness reported by patient. Images with with more acne appearance on forehead. THis history and exam should be reconciled with an in person visit to move forward. Until then, we will move back to gentle skin care which should improve this and she will call if she is worsening.   -Hydrocortisone 2.5% twice daily for 1 week, take 1 off, then repeat 1 on and 1 off   -follow up 4 weeks with me in person or photos and phone  -Reviewed risks of skin thinning.  Follow-up:   Follow-up with dermatology in approximately 4 weeks in person if possible. Earlier for new or changing lesions or rash.      Staff only    Denise Payan MD    Department of Dermatology  [facility]: Phone: [phone], Fax:[phone]  [facility]: Phone: [phone], Fax:  [phone]      _____________________________________________________________________________    Teledermatology information:  - Location of teledermatologist:  [facility] )  - Image quality and interpretability: acceptable  - Date of images: today  - Service start time:12:53  - Service end time:1:03pm  - Date of report: 12/22/2020         Again, thank you for allowing me to participate in the care of your patient.        Sincerely,        Denise Payan MD

## 2020-12-22 NOTE — NURSING NOTE
"Teledermatology Nurse Call Patients:     Are you  in the [facility] at the time of the encounter? yes    Today's visit will be billed to you and your insurance.    A teledermatology visit is not as thorough as an in-person visit and the quality of the photograph sent may not be of the same quality as that taken by the dermatology clinic.       Chief Complaint   Patient presents with     Derm Problem     Acne follow up          1. Facial redness on chin - \"inflamed raised rash\" and dryness on forehead reported by patient. Getting worse and spreading.       Using Vanicream daily to face     [id number], MEDICAL ASSISTANT                                                                                                                                                                                                                            "

## 2020-12-22 NOTE — PATIENT INSTRUCTIONS
[facility] Dermatology Visit    Thank you for allowing us to participate in your care. Your findings, instructions and follow-up plan are as follows:         When should I call my doctor?    If you are worsening or not improving, please, contact us or seek urgent care as noted below.     Who should I call with questions (adults)?    [facility] (adult and pediatric): [phone]     [facility] (adult): [phone]    For urgent needs outside of business hours call the [facility] at [phone] and ask for the dermatology resident on call    If this is a medical emergency and you are unable to reach an ER, Call 911      Who should I call with questions (pediatric)?  [facility]- Pediatric Dermatology  Dr. Cindi Cummings, Dr. Flora Carter, Dr. Reva Hanley, Ramona Francois, PA  Dr. Ana London, Dr. Sangita Worthy & Dr. Chemo Guzman  Non Urgent  Nurse Triage Line; [phone]- Kendal and Apurva RN Care Coordinators   Charley (/Complex ) [phone]    If you need a prescription refill, please contact your pharmacy. Refills are approved or denied by our Physicians during normal business hours, Monday through Fridays  Per office policy, refills will not be granted if you have not been seen within the past year (or sooner depending on your child's condition)    Scheduling Information:  Pediatric Appointment Scheduling and Call Center [phone]  Radiology Scheduling- [phone]  Sedation Unit Scheduling- [phone]  [facility] Scheduling- General [phone]; Pediatric Dermatology [phone]  Main  Services: [phone]  Mongolian: [phone]  Stateless: [phone]  Hmong/Irish/Greenlandic: [phone]  Preadmission Nursing Department Fax Number: [phone] (Fax all pre-operative paperwork to this number)    For urgent matters arising during evenings,  weekends, or holidays that cannot wait for normal business hours please call [phone] and ask for the Dermatology Resident On-Call to be paged.

## 2020-12-22 NOTE — PROGRESS NOTES
[facility] Dermatology Record:  Store and Forward and Telephone [phone]     Please use Ipad or call [phone] option 1 then option 1 for phone  if needed - Luxembourger     Dermatology Problem List:  1. Rosacea  - Prev response to metronidazole 0.75% gel BID  - Current t/x: Doxy 100 mg, metronidazole 0.75% gel  2. Hx of borderline ABRAHAN    Encounter Date: Dec 22, 2020    CC:   Chief Complaint   Patient presents with     Derm Problem     Acne follow up        History of Present Illness:  Jo Ann Patel is a 35 year old female who presents for follow up for face. At least visit we recommended an inperson visit.However, she was rescheduled to telemedicine due to covid protocols she reports. She is bothered by the look of inflammation. No itching. Notes redness on the cheeks.     Interpretor present today(Luxembourger)    ROS: Patient is generally feeling well today. She is breastfeeding, not pregnant  Physical Examination:  General: Well-appearing  appropriately-developed individual.  Skin:  Exam was performed by photo review and is significant for the following:  -mild macular erythema of the left cheek    Labs:  NA    Past Medical History:   Patient Active Problem List   Diagnosis     H. pylori infection     Gastroesophageal reflux disease, esophagitis presence not specified     Hepatic steatosis     Non-ulcer dyspepsia     Gallbladder sludge     Itchy eyes     Allergic conjunctivitis, right     Acne rosacea     Flu vaccine need     Patient desires vaginal birth after  section ()     Past Medical History:   Diagnosis Date     Hepatic steatosis 2019     Past Surgical History:   Procedure Laterality Date     APPENDECTOMY        SECTION N/A 7/3/2017    Procedure:  SECTION;  Primary  Section ;  Surgeon: Luz Maria Kwan MD;  Location: [facility]     HC INSERTION INTRAUTERINE DEVICE       HC REMOVE INTRAUTERINE DEVICE         Social History:  Patient  reports that she has never smoked. She has never used smokeless tobacco. She reports that she does not drink alcohol or use drugs.    Family History:  Family History   Problem Relation Age of Onset     Cerebrovascular Disease Maternal Grandfather      Heart Disease Maternal Grandfather      Other - See Comments Daughter         Hydrocephaly       Medications:  Current Outpatient Medications   Medication     acetaminophen (TYLENOL) 325 MG tablet     ciprofloxacin-hydrocortisone (CIPRO HC OTIC) 0.2-1 % otic suspension     No current facility-administered medications for this visit.           No Known Allergies        Impression and Recommendations (Patient Counseled on the Following):  1.Facial redness and dryness reported by patient. Images with with more acne appearance on forehead. THis history and exam should be reconciled with an in person visit to move forward. Until then, we will move back to gentle skin care which should improve this and she will call if she is worsening.   -Hydrocortisone 2.5% twice daily for 1 week, take 1 off, then repeat 1 on and 1 off   -follow up 4 weeks with me in person or photos and phone  -Reviewed risks of skin thinning.  Follow-up:   Follow-up with dermatology in approximately 4 weeks in person if possible. Earlier for new or changing lesions or rash.      Staff only    Denise Payan MD    Department of Dermatology  [facility]: Phone: [phone], Fax:[phone]  [facility]: Phone: [phone], Fax: [phone]      _____________________________________________________________________________    Teledermatology information:  - Location of teledermatologist:  ([facility] )  - Image quality and interpretability: acceptable  - Date of images: today  - Service start time:12:53  - Service end time:1:03pm  - Date of report:  12/22/2020

## 2020-12-23 ENCOUNTER — APPOINTMENT (OUTPATIENT)
Dept: INTERPRETER SERVICES | Facility: CLINIC | Age: 35
End: 2020-12-23
Payer: COMMERCIAL

## 2020-12-27 ENCOUNTER — HEALTH MAINTENANCE LETTER (OUTPATIENT)
Age: 35
End: 2020-12-27

## 2020-12-28 ENCOUNTER — APPOINTMENT (OUTPATIENT)
Dept: INTERPRETER SERVICES | Facility: CLINIC | Age: 35
End: 2020-12-28
Payer: COMMERCIAL

## 2020-12-29 ENCOUNTER — OFFICE VISIT (OUTPATIENT)
Dept: AUDIOLOGY | Facility: CLINIC | Age: 35
End: 2020-12-29
Payer: COMMERCIAL

## 2020-12-29 ENCOUNTER — OFFICE VISIT (OUTPATIENT)
Dept: OTOLARYNGOLOGY | Facility: CLINIC | Age: 35
End: 2020-12-29
Payer: COMMERCIAL

## 2020-12-29 ENCOUNTER — APPOINTMENT (OUTPATIENT)
Dept: INTERPRETER SERVICES | Facility: CLINIC | Age: 35
End: 2020-12-29
Payer: COMMERCIAL

## 2020-12-29 DIAGNOSIS — M26.609 TMJ (TEMPOROMANDIBULAR JOINT SYNDROME): ICD-10-CM

## 2020-12-29 DIAGNOSIS — H92.03 OTALGIA OF BOTH EARS: Primary | ICD-10-CM

## 2020-12-29 DIAGNOSIS — H60.391 INFECTIVE OTITIS EXTERNA, RIGHT: Primary | ICD-10-CM

## 2020-12-29 PROCEDURE — 92550 TYMPANOMETRY & REFLEX THRESH: CPT | Performed by: AUDIOLOGIST

## 2020-12-29 PROCEDURE — 92553 AUDIOMETRY AIR & BONE: CPT | Performed by: AUDIOLOGIST

## 2020-12-29 PROCEDURE — 92555 SPEECH THRESHOLD AUDIOMETRY: CPT | Performed by: AUDIOLOGIST

## 2020-12-29 PROCEDURE — 99213 OFFICE O/P EST LOW 20 MIN: CPT | Performed by: OTOLARYNGOLOGY

## 2020-12-29 RX ORDER — CYCLOBENZAPRINE HCL 5 MG
5 TABLET ORAL 3 TIMES DAILY PRN
Qty: 30 TABLET | Refills: 0 | Status: SHIPPED | OUTPATIENT
Start: 2020-12-29 | End: 2021-07-14

## 2020-12-29 RX ORDER — CLOTRIMAZOLE 1 G/ML
SOLUTION TOPICAL 2 TIMES DAILY
Qty: 30 ML | Refills: 0 | Status: SHIPPED | OUTPATIENT
Start: 2020-12-29 | End: 2021-01-19

## 2020-12-29 ASSESSMENT — ENCOUNTER SYMPTOMS
PHOTOPHOBIA: 0
HEARTBURN: 0
VOMITING: 0
DIZZINESS: 0
HEMOPTYSIS: 0
TINGLING: 0
CONSTITUTIONAL NEGATIVE: 1
BRUISES/BLEEDS EASILY: 0
SINUS PAIN: 0
SPUTUM PRODUCTION: 0
HEADACHES: 1
STRIDOR: 0
COUGH: 0
DOUBLE VISION: 0
NAUSEA: 0
BLURRED VISION: 0
SORE THROAT: 0

## 2020-12-29 NOTE — PROGRESS NOTES
HPI  This pleasant patient is having itchiness in her both ears for the past several weeks. States headache, facial pressure and nasal congestion. Feels pain when she touches her TMJs; her headaches are dull; constant and daily. Describes buzzing sound in her right ear from time to time. Denies any nose bleeds, post nasal drip, aural pressure, drainage, or dizziness.  Pt reports bilateral otalgia. Also reports intermittent aural fullness and tinnitus in her right ear. Accompanied by  on the phone.  Results: Hearing WNL. Good agreement btw speech and tone thresholds. Tymps WNL. Present 1 kHz ipsi/contra reflexes bilaterally.    Review of Systems   Constitutional: Negative.    HENT: Positive for ear pain. Negative for congestion, ear discharge, hearing loss, nosebleeds, sinus pain, sore throat and tinnitus.    Eyes: Negative for blurred vision, double vision and photophobia.   Respiratory: Negative for cough, hemoptysis, sputum production and stridor.    Gastrointestinal: Negative for heartburn, nausea and vomiting.   Skin: Negative.    Neurological: Positive for headaches. Negative for dizziness and tingling.   Endo/Heme/Allergies: Does not bruise/bleed easily.         Physical Exam  Vitals signs reviewed.   Constitutional:       Appearance: Normal appearance.   HENT:      Head: Normocephalic and atraumatic.      Jaw: There is normal jaw occlusion.      Salivary Glands: Right salivary gland is not diffusely enlarged or tender. Left salivary gland is not diffusely enlarged.      Right Ear: Hearing and tympanic membrane normal. No decreased hearing noted. No middle ear effusion. There is no impacted cerumen.      Left Ear: Hearing and tympanic membrane normal. No decreased hearing noted.  No middle ear effusion. There is no impacted cerumen.      Nose: Septal deviation present. No laceration, mucosal edema, congestion or rhinorrhea.      Right Turbinates: Not enlarged or swollen.      Left  Turbinates: Not enlarged or swollen.      Mouth/Throat:      Mouth: Mucous membranes are moist.      Tongue: No lesions. Tongue does not deviate from midline.      Palate: No mass and lesions.      Pharynx: Oropharynx is clear. Uvula midline.   Eyes:      Extraocular Movements: Extraocular movements intact.      Pupils: Pupils are equal, round, and reactive to light.   Neurological:      Mental Status: She is alert.     Ear canals: swollen; left ear canal showed fungal elements.  TMJs are sensitive to palpation.    A/P  This pleasant patient is having bilateral otomycosis and topical clotrimazole will be given x2 four drops for 3 weeks. Regarding her TMJ symptoms, I will start a low dose Cyclobenzaprine 5 mg. BID for 2 weeks and see her in the f/u.

## 2020-12-29 NOTE — PROGRESS NOTES
AUDIOLOGY REPORT    SUMMARY: Audiology visit completed. See audiogram for results.    RECOMMENDATIONS: Follow-up with ENT.    Miguel Peña  Doctor of Audiology  [place] License # [id number]

## 2020-12-29 NOTE — NURSING NOTE
Jo Ann Patel's goals for this visit include:   Chief Complaint   Patient presents with     Ear Problem     Itching of ear, audio prior     She requests these members of her care team be copied on today's visit information: Yes    PCP: Robson Wilson    Referring Provider:  No referring provider defined for this encounter.    There were no vitals taken for this visit.    Do you need any medication refills at today's visit? No    Lindsey Ramsey LPN

## 2020-12-29 NOTE — LETTER
12/29/2020         RE: JoA nn Patel  [address]  [facility] [address]        Dear Colleague,    Thank you for referring your patient, Jo Ann Patel, to the [facility]. Please see a copy of my visit note below.    HPI  This pleasant patient is having itchiness in her both ears for the past several weeks. States headache, facial pressure and nasal congestion. Feels pain when she touches her TMJs; her headaches are dull; constant and daily. Describes buzzing sound in her right ear from time to time. Denies any nose bleeds, post nasal drip, aural pressure, drainage, or dizziness.  Pt reports bilateral otalgia. Also reports intermittent aural fullness and tinnitus in her right ear. Accompanied by  on the phone.  Results: Hearing WNL. Good agreement btw speech and tone thresholds. Tymps WNL. Present 1 kHz ipsi/contra reflexes bilaterally.    Review of Systems   Constitutional: Negative.    HENT: Positive for ear pain. Negative for congestion, ear discharge, hearing loss, nosebleeds, sinus pain, sore throat and tinnitus.    Eyes: Negative for blurred vision, double vision and photophobia.   Respiratory: Negative for cough, hemoptysis, sputum production and stridor.    Gastrointestinal: Negative for heartburn, nausea and vomiting.   Skin: Negative.    Neurological: Positive for headaches. Negative for dizziness and tingling.   Endo/Heme/Allergies: Does not bruise/bleed easily.         Physical Exam  Vitals signs reviewed.   Constitutional:       Appearance: Normal appearance.   HENT:      Head: Normocephalic and atraumatic.      Jaw: There is normal jaw occlusion.      Salivary Glands: Right salivary gland is not diffusely enlarged or tender. Left salivary gland is not diffusely enlarged.      Right Ear: Hearing and tympanic membrane normal. No decreased hearing noted. No middle ear effusion. There is no impacted cerumen.      Left Ear:  Hearing and tympanic membrane normal. No decreased hearing noted.  No middle ear effusion. There is no impacted cerumen.      Nose: Septal deviation present. No laceration, mucosal edema, congestion or rhinorrhea.      Right Turbinates: Not enlarged or swollen.      Left Turbinates: Not enlarged or swollen.      Mouth/Throat:      Mouth: Mucous membranes are moist.      Tongue: No lesions. Tongue does not deviate from midline.      Palate: No mass and lesions.      Pharynx: Oropharynx is clear. Uvula midline.   Eyes:      Extraocular Movements: Extraocular movements intact.      Pupils: Pupils are equal, round, and reactive to light.   Neurological:      Mental Status: She is alert.     Ear canals: swollen; left ear canal showed fungal elements.  TMJs are sensitive to palpation.    A/P  This pleasant patient is having bilateral otomycosis and topical clotrimazole will be given x2 four drops for 3 weeks. Regarding her TMJ symptoms, I will start a low dose Cyclobenzaprine 5 mg. BID for 2 weeks and see her in the f/u.          Again, thank you for allowing me to participate in the care of your patient.        Sincerely,        Salome Casarez MD

## 2020-12-30 ENCOUNTER — APPOINTMENT (OUTPATIENT)
Dept: INTERPRETER SERVICES | Facility: CLINIC | Age: 35
End: 2020-12-30
Payer: COMMERCIAL

## 2021-01-19 ENCOUNTER — OFFICE VISIT (OUTPATIENT)
Dept: DERMATOLOGY | Facility: CLINIC | Age: 36
End: 2021-01-19
Payer: COMMERCIAL

## 2021-01-19 DIAGNOSIS — L70.0 ACNE VULGARIS: Primary | ICD-10-CM

## 2021-01-19 DIAGNOSIS — Z76.89 ENCOUNTER PRIOR TO INITIATION OF MEDICATION: ICD-10-CM

## 2021-01-19 LAB — HCG UR QL: NEGATIVE

## 2021-01-19 PROCEDURE — 81025 URINE PREGNANCY TEST: CPT | Performed by: DERMATOLOGY

## 2021-01-19 PROCEDURE — 99214 OFFICE O/P EST MOD 30 MIN: CPT | Performed by: DERMATOLOGY

## 2021-01-19 RX ORDER — CLINDAMYCIN PHOSPHATE 10 UG/ML
LOTION TOPICAL
Qty: 60 ML | Refills: 11 | Status: SHIPPED | OUTPATIENT
Start: 2021-01-19 | End: 2021-07-14

## 2021-01-19 RX ORDER — AZELAIC ACID 0.15 G/G
GEL TOPICAL
Qty: 50 G | Refills: 11 | Status: SHIPPED | OUTPATIENT
Start: 2021-01-19 | End: 2021-01-19

## 2021-01-19 RX ORDER — DOXYCYCLINE 40 MG/1
40 CAPSULE ORAL DAILY
Qty: 90 CAPSULE | Refills: 0 | Status: SHIPPED | OUTPATIENT
Start: 2021-01-19 | End: 2021-07-14

## 2021-01-19 ASSESSMENT — PAIN SCALES - GENERAL: PAINLEVEL: NO PAIN (0)

## 2021-01-19 NOTE — NURSING NOTE
Jo Ann Patel's goals for this visit include:   Chief Complaint   Patient presents with     Derm Problem     Facial redness and dryness f/u; getting better.        She requests these members of her care team be copied on today's visit information: Yes     PCP: Robson Wilson    Referring Provider:  No referring provider defined for this encounter.    There were no vitals taken for this visit.    Do you need any medication refills at today's visit? No   [id number], MEDICAL ASSISTANT

## 2021-01-19 NOTE — PATIENT INSTRUCTIONS
[facility]    Thank you for allowing us to participate in your care. Your findings, instructions and follow-up plan are as follows:         When should I call my doctor?    If you are worsening or not improving, please, contact us or seek urgent care as noted below.     Who should I call with questions (adults)?    [facility] (adult and pediatric): [phone]     [facility] (adult): [phone]    For urgent needs outside of business hours call the [facility] at [phone] and ask for the dermatology resident on call    If this is a medical emergency and you are unable to reach an ER, Call 911      Who should I call with questions (pediatric)?  [facility]- Pediatric Dermatology  Dr. Cindi Cummings, Dr. Flora Carter, Dr. Reva Hanley, Ramona Francois, PA  Dr. Ana London, Dr. Sangita Worthy & Dr. Chemo Guzman  Non Urgent  Nurse Triage Line; [phone]- Kendal and Apurva RN Care Coordinators   Charley (/Complex ) [phone]    If you need a prescription refill, please contact your pharmacy. Refills are approved or denied by our Physicians during normal business hours, Monday through Fridays  Per office policy, refills will not be granted if you have not been seen within the past year (or sooner depending on your child's condition)    Scheduling Information:  Pediatric Appointment Scheduling and Call Center [phone]  Radiology Scheduling- [phone]  Sedation Unit Scheduling- [phone]  [place] Scheduling- General [phone]; Pediatric Dermatology [phone]  Main  Services: [phone]  Tongan: [phone]  Mosotho: [phone]  Hmong/Belizean/Uzbek: [phone]  Preadmission Nursing Department Fax Number: [phone] (Fax all pre-operative paperwork to this number)    For urgent  matters arising during evenings, weekends, or holidays that cannot wait for normal business hours please call [phone] and ask for the Dermatology Resident On-Call to be paged.

## 2021-01-19 NOTE — PROGRESS NOTES
[facility] Dermatology Note  Encounter Date: Jan 19, 2021  Office Visit     Dermatology Problem List:  1. Rosacea  - Prev response to metronidazole 0.75% gel BID, has had azelaic acid in the past.   - Previous t/x:  metronidazole 0.75% gel, has been on doxy  2. Hx of borderline ABRAHAN    ____________________________________________    Assessment & Plan:  #Normal facial skin, no dryness seen   - Apply Cetaphil moisturizer cream daily to the forehead.    # Acne vulgaris, cheeks, chin, site of mask use. Failed metrogel as per last record   - Start clindamycin once daily  -Hcg today  -Start doxycycline 100 mg BID. Recommend that patient try tocalcium-containing products around the time of taking the medication.  Instructed to take with a full glass of fluid and food, not lying down.  Side effects of photosensitivity, headaches, GI upset discussed. Recommend sun protection.  Use form of pregnancy protection. She is using condoms and will add female condome or spermacide. .   Procedures Performed:   None.    Follow-up:  In 8 weeks in person    Staff and Scribe:     Scribe Disclosure:   I, Parth Walters, am serving as a scribe to document services personally performed by this physician, Dr. Denise Payan, based on data collection and the provider's statements to me.     Provider Disclosure:   The documentation recorded by the scribe accurately reflects the services I personally performed and the decisions made by me.    Denise Payan MD    Department of Dermatology  [facility]: Phone: [phone], Fax:[phone]  [facility]: Phone: [phone], Fax: [phone]      ____________________________________________    CC: Derm Problem (Facial redness and dryness f/u; getting better. No current treatment. )      HPI:  Ms. Jo Ann Patel is a(n) 35 year old female who  presents today as a return patient for facial redness.    Last seen on 2020 with reported facial redness and dryness when started on hydrocortisone 2.5% twice daily for one week, one week off, then to repeat 1 week on and 1 week off. Patient was instructed to follow up in person 4 weeks after that visit.    Today, she notes that her facial redness and dryness has been improving. She is using an  over the phone. She has dryness on the forehead. She notes acne didn't occur until after starting masks. She also believes her chin has been getting a lot of acne. She also reports itchiness on her face, that began one year ago, comes and goes. Reports no improvement with metronidazole gel in the past.    Patient is otherwise feeling well, without additional concerns.      Interpretor Citizen of [place] on phone.     Labs:  NA    Physical Exam:  Vitals: There were no vitals taken for this visit.  SKIN: Focused examination of the face was performed.  - Acneiform papules on the chin and cheeks   -normal forehead skin  - No other lesions of concern on areas examined.     Medications:  Current Outpatient Medications   Medication     acetaminophen (TYLENOL) 325 MG tablet     ciprofloxacin-hydrocortisone (CIPRO HC OTIC) 0.2-1 % otic suspension     clotrimazole (LOTRIMIN) 1 % external solution     cyclobenzaprine (FLEXERIL) 5 MG tablet     hydrocortisone 2.5 % cream     No current facility-administered medications for this visit.       Past Medical/Surgical History:   Patient Active Problem List   Diagnosis     H. pylori infection     Gastroesophageal reflux disease, esophagitis presence not specified     Hepatic steatosis     Non-ulcer dyspepsia     Gallbladder sludge     Itchy eyes     Allergic conjunctivitis, right     Acne rosacea     Flu vaccine need     Patient desires vaginal birth after  section ()     Past Medical History:   Diagnosis Date     Hepatic steatosis 2019         No referring provider  defined for this encounter. on close of this encounter.

## 2021-01-19 NOTE — LETTER
1/19/2021         RE: Jo Ann Patel  [address]  [facility] [address]        Dear Colleague,    Thank you for referring your patient, Jo Ann Patel, to the [facility]. Please see a copy of my visit note below.    [facility] Dermatology Note  Encounter Date: Jan 19, 2021  Office Visit     Dermatology Problem List:  1. Rosacea  - Prev response to metronidazole 0.75% gel BID, has had azelaic acid in the past.   - Previous t/x:  metronidazole 0.75% gel, has been on doxy  2. Hx of borderline ABRAHAN    ____________________________________________    Assessment & Plan:  #Normal facial skin, no dryness seen   - Apply Cetaphil moisturizer cream daily to the forehead.    # Acne vulgaris, cheeks, chin, site of mask use. Failed metrogel as per last record   - Start clindamycin once daily  -Hcg today  -Start doxycycline 100 mg BID. Recommend that patient try tocalcium-containing products around the time of taking the medication.  Instructed to take with a full glass of fluid and food, not lying down.  Side effects of photosensitivity, headaches, GI upset discussed. Recommend sun protection.  Use form of pregnancy protection. She is using condoms and will add female condome or spermacide. .   Procedures Performed:   None.    Follow-up:  In 8 weeks in person    Staff and Scribe:     Scribe Disclosure:   I, Parth Walters, am serving as a scribe to document services personally performed by this physician, Dr. Denise Payan, based on data collection and the provider's statements to me.     Provider Disclosure:   The documentation recorded by the scribe accurately reflects the services I personally performed and the decisions made by me.    Denise Payan MD    Department of Dermatology  [facility]: Phone: [phone], Fax:[phone]  [facility]  [facility]: Phone: [phone], Fax: [phone]      ____________________________________________    CC: Derm Problem (Facial redness and dryness f/u; getting better. No current treatment. )      HPI:  Ms. Jo Ann Patel is a(n) 35 year old female who presents today as a return patient for facial redness.    Last seen on 12/22/2020 with reported facial redness and dryness when started on hydrocortisone 2.5% twice daily for one week, one week off, then to repeat 1 week on and 1 week off. Patient was instructed to follow up in person 4 weeks after that visit.    Today, she notes that her facial redness and dryness has been improving. She is using an  over the phone. She has dryness on the forehead. She notes acne didn't occur until after starting masks. She also believes her chin has been getting a lot of acne. She also reports itchiness on her face, that began one year ago, comes and goes. Reports no improvement with metronidazole gel in the past.    Patient is otherwise feeling well, without additional concerns.      Interpretor Norwegian on phone.     Labs:  NA    Physical Exam:  Vitals: There were no vitals taken for this visit.  SKIN: Focused examination of the face was performed.  - Acneiform papules on the chin and cheeks   -normal forehead skin  - No other lesions of concern on areas examined.     Medications:  Current Outpatient Medications   Medication     acetaminophen (TYLENOL) 325 MG tablet     ciprofloxacin-hydrocortisone (CIPRO HC OTIC) 0.2-1 % otic suspension     clotrimazole (LOTRIMIN) 1 % external solution     cyclobenzaprine (FLEXERIL) 5 MG tablet     hydrocortisone 2.5 % cream     No current facility-administered medications for this visit.       Past Medical/Surgical History:   Patient Active Problem List   Diagnosis     H. pylori infection     Gastroesophageal reflux disease, esophagitis presence not specified     Hepatic steatosis     Non-ulcer dyspepsia      Gallbladder sludge     Itchy eyes     Allergic conjunctivitis, right     Acne rosacea     Flu vaccine need     Patient desires vaginal birth after  section ()     Past Medical History:   Diagnosis Date     Hepatic steatosis 2019        CC No referring provider defined for this encounter. on close of this encounter.      Again, thank you for allowing me to participate in the care of your patient.        Sincerely,        Denise Payan MD

## 2021-06-04 ENCOUNTER — NURSE TRIAGE (OUTPATIENT)
Dept: FAMILY MEDICINE | Facility: CLINIC | Age: 36
End: 2021-06-04

## 2021-06-04 NOTE — TELEPHONE ENCOUNTER
" on call with pt.    Pt calling because she tested positive for covid.  Now for two days she is having spasms in chin, and 15 min ago numbness in area.  Pt having covid sx now of fatigue.  Pt first day of covid sx was on last Sun.   Pt denies any injury to area.  Pt is eating and drinking normally.  Numbness area is front of chin and then under to left side, pt is having trouble concentrating.  Pt denies any weakness on left leg or arm.  Pt is not having trouble putting thoughts into words.      Reason for Disposition    New neurologic deficit that is present NOW, sudden onset of ANY of the following: * Weakness of the face, arm, or leg on one side of the body* Numbness of the face, arm, or leg on one side of the body* Loss of speech or garbled speech    Answer Assessment - Initial Assessment Questions  1. SYMPTOM: \"What is the main symptom you are concerned about?\" (e.g., weakness, numbness)      Chin spasms since yesterday and now for last 15 min   2. ONSET: \"When did this start?\" (minutes, hours, days; while sleeping)      See above  3. LAST NORMAL: \"When was the last time you were normal (no symptoms)?\"      See above  4. PATTERN \"Does this come and go, or has it been constant since it started?\"  \"Is it present now?\"      In has been constant and is not resolving  5. CARDIAC SYMPTOMS: \"Have you had any of the following symptoms: chest pain, difficulty breathing, palpitations?\"      Pt denies any chest pain or trouble breathing  6. NEUROLOGIC SYMPTOMS: \"Have you had any of the following symptoms: headache, dizziness, vision loss, double vision, changes in speech, unsteady on your feet?\"      Pt states she is having trouble concentrating.  See other nursing note.  7. OTHER SYMPTOMS: \"Do you have any other symptoms?\"      Pt has covid and fatigue,  No know injury or pressure on area. Pt has just been sitting around  8. PREGNANCY: \"Is there any chance you are pregnant?\" \"When was your last " "menstrual period?\"      Not asked.    Protocols used: NEUROLOGIC DEFICIT-A-[place]      "

## 2021-06-16 ENCOUNTER — NURSE TRIAGE (OUTPATIENT)
Dept: FAMILY MEDICINE | Facility: CLINIC | Age: 36
End: 2021-06-16

## 2021-06-16 ENCOUNTER — APPOINTMENT (OUTPATIENT)
Dept: INTERPRETER SERVICES | Facility: CLINIC | Age: 36
End: 2021-06-16
Payer: COMMERCIAL

## 2021-06-16 NOTE — TELEPHONE ENCOUNTER
"    Reason for Disposition    Chest pain present when not coughing    Difficulty breathing    Patient sounds very sick or weak to the triager    SEVERE coughing spells (e.g., whooping sound after coughing, vomiting after coughing)    Additional Information    Negative: Bluish (or gray) lips or face    Negative: Severe difficulty breathing (e.g., struggling for each breath, speaks in single words)    Negative: Rapid onset of cough and has hives    Negative: Coughing started suddenly after medicine, an allergic food or bee sting    Negative: Difficulty breathing after exposure to flames, smoke, or fumes    Negative: Sounds like a life-threatening emergency to the triager    Negative: Previous asthma attacks and this feels like asthma attack    Negative: Passed out (i.e., fainted, collapsed and was not responding)    Answer Assessment - Initial Assessment Questions  1. ONSET: \"When did the cough begin?\"       yesterday  2. SEVERITY: \"How bad is the cough today?\"       Lots of coughing, fits , cough seems to be pretty bad today  3. RESPIRATORY DISTRESS: \"Describe your breathing.\"       Seems short of breath and winded   4. FEVER: \"Do you have a fever?\" If so, ask: \"What is your temperature, how was it measured, and when did it start?\"      No fever  5. HEMOPTYSIS: \"Are you coughing up any blood?\" If so ask: \"How much?\" (flecks, streaks, tablespoons, etc.)      No     10. OTHER SYMPTOMS: \"Do you have any other symptoms? (e.g., runny nose, wheezing, chest pain)        Chest pain, mid and center of chest, hurts to breathe and when coughing and certain movements.  Coughing fits, patient called with , could barely get through triage conversation, almost constant coughing. Feels worse today.  Had positive COVID result on May 31st, but was asymptomatic at that time. Did saliva test through State testing. Has been very tired since positive for COVID, but no other symptoms till this past week. Coughing started " yesterday, having back pain, voice hoarseness. Denies fever, confusion. Feels cough was both dry and now productive, right sided chest pain with breathing and coughing.    Protocols used: COUGH-A-[place]

## 2021-07-13 NOTE — PROGRESS NOTES
Assessment & Plan     History of COVID-19  Patient had Covid on June 2  She recovered fully and does not have any residual or post Covid symptoms at this time  Patient is concerned with her upcoming travel to [place] due to the Covid illness  She is not sure of the date of traveling yet  We will get the Covid test 3 to 4 days prior to travel  If it is positive, but patient being asymptomatic, we can give her a letter stating her status  Explained to patient that she may test positive for Covid for at least 3 months after the initial COVID-19 infection  Patient verbalised understanding and is agreeable to the plan.      Cat bite, subsequent encounter  Treated with 1 week course of Augmentin in the ER on 6/26/2021  Reassured patient on resolution of the symptoms  No further follow-up needed at this time    Allergic conjunctivitis, left  Likely causing the minimal eyelid swelling and occasional discharge  Prescription given for Patanol eyedrops to use 1 drop twice a day in the left eye, follow-up if no better in 1 week or sooner if needed  - olopatadine (PATANOL) 0.1 % ophthalmic solution; Place 1 drop Into the left eye 2 times daily    Aphthous ulcer of tongue  Reviewed relaxation techniques, continue with oral hygiene, push fluids  Use Kenalog paste twice a day over the lesions as needed  - triamcinolone (KENALOG) 0.1 % paste; Take by mouth 2 times daily    Twitching of left eye  Reassured patient, use the Patanol eyedrops as mentioned above  Increase fluid intake,, sleep well ,avoid stress,  Follow-up as needed  for persistent or worsening concerns  Patient verbalised understanding and is agreeable to the plan.               Chart documentation done in part with Dragon Voice recognition Software. Although reviewed after completion, some word and grammatical error may remain.    See Patient Instructions    Return in about 3 months (around 10/14/2021), or if symptoms worsen or fail to improve, for Physical Exam,  fasting labs.    Robson Wilson MD  [facility] FLORECITA Cherry is a 36 year old who presents for the following health issues   Patient is here today with a  through the phone services for the following concerns  Patient reported having COVID-19 infection, after being tested positive on June 2  2 weeks after the positive testing, she was seen in the emergency room for abdominal pain and a cough  Patient is currently feeling well with no residual symptoms of post Covid symptoms  Denies fever, chills, runny nose, sore throat, cough, shortness of breath, abdominal pain, diarrhea, abnormal skin rashes  She is planning to travel to [place] in the next few weeks, but is worried about her test for covid that she needs to take prior to travel  She is wondering what if it comes positive and is requesting a letter from the provider to let her travel  She is planning to stay in [place] for 10 days  She has not received the Covid vaccination yet  Patient also reported having twitching of the left upper eyelid and the chin muscles frequently after her Covid  Also noted mild swelling of the left upper eyelid with occasional discharge, denies blurred or double vision.  Uses glasses  Denies right eye symptoms  Has no previous history of allergies, patient does not smoke  She also had a cat bite on the right upper back and on the scalp on 6/26/2021 when she was seen in the emergency room, was treated with a 1 week course of Augmentin  Patient is wondering if she needs to have any blood test or other follow-up following this cat bite  She does not have any concerning open wounds, fever, chills, joint pains or other abnormal rashes  She also noted waxing and waning slightly painful spot on the tip and side of the tongue in the past few weeks     HPI     Cat attack- patient is concerned about issues-  covid June 2nd- patient states having a tic due to covid symptoms   Patient  would like to travel to [place] and would like to know if there is a note needed for travel       Review of Systems   CONSTITUTIONAL: NEGATIVE for fever, chills, change in weight  INTEGUMENTARY/SKIN:   EYES: as above  ENT/MOUTH:as above  RESP: NEGATIVE for significant cough or SOB  CV: NEGATIVE for chest pain, palpitations or peripheral edema  GI: NEGATIVE for nausea, abdominal pain, heartburn, or change in bowel habits  MUSCULOSKELETAL: NEGATIVE for significant arthralgias or myalgia  NEURO: NEGATIVE for weakness, dizziness or paresthesias  PSYCHIATRIC: NEGATIVE for changes in mood or affect      Objective    /62   Pulse 89   Resp 14   Wt 58.7 kg (129 lb 8 oz)   SpO2 96%   BMI 26.16 kg/m    Body mass index is 26.16 kg/m .  Physical Exam   GENERAL: healthy, alert and no distress  EYES: right-Eyes grossly normal to inspection, PERRL and conjunctivae and sclerae normal  EYES: left-minimal swelling of left upper eyelid  HENT: ear canals and TM's normal, nose and mouth without ulcers or lesions  HENT: tongue tip- 1-2mm shallow ulcer  RESP: lungs clear to auscultation - no rales, rhonchi or wheezes  CV: regular rate and rhythm, normal S1 S2, no S3 or S4, no murmur, click or rub, no peripheral edema and peripheral pulses strong  ABDOMEN: soft, nontender, no hepatosplenomegaly, no masses and bowel sounds normal  MS: no gross musculoskeletal defects noted, no edema  SKIN: healed cat scratch marks -scalp and right upper back  PSYCH: mentation appears normal, affect normal/bright

## 2021-07-14 ENCOUNTER — OFFICE VISIT (OUTPATIENT)
Dept: FAMILY MEDICINE | Facility: CLINIC | Age: 36
End: 2021-07-14
Payer: COMMERCIAL

## 2021-07-14 VITALS
RESPIRATION RATE: 14 BRPM | SYSTOLIC BLOOD PRESSURE: 100 MMHG | WEIGHT: 129.5 LBS | DIASTOLIC BLOOD PRESSURE: 62 MMHG | HEART RATE: 89 BPM | OXYGEN SATURATION: 96 % | BODY MASS INDEX: 26.16 KG/M2

## 2021-07-14 DIAGNOSIS — K12.0 APHTHOUS ULCER OF TONGUE: ICD-10-CM
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
## 2021-07-14 DIAGNOSIS — H10.12 ALLERGIC CONJUNCTIVITIS, LEFT: ICD-10-CM

## 2021-07-14 DIAGNOSIS — G24.5 EYE TWITCH: ICD-10-CM

## 2021-07-14 DIAGNOSIS — Z86.16 HISTORY OF COVID-19: Primary | ICD-10-CM

## 2021-07-14 PROCEDURE — T1013 SIGN LANG/ORAL INTERPRETER: HCPCS | Mod: U4 | Performed by: FAMILY MEDICINE

## 2021-07-14 PROCEDURE — 99214 OFFICE O/P EST MOD 30 MIN: CPT | Performed by: FAMILY MEDICINE

## 2021-07-14 RX ORDER — OLOPATADINE HYDROCHLORIDE 1 MG/ML
1 SOLUTION/ DROPS OPHTHALMIC 2 TIMES DAILY
Qty: 5 ML | Refills: 1 | Status: SHIPPED | OUTPATIENT
Start: 2021-07-14 | End: 2021-10-22

## 2021-07-14 RX ORDER — TRIAMCINOLONE ACETONIDE 0.1 %
PASTE (GRAM) DENTAL 2 TIMES DAILY
Qty: 5 G | Refills: 0 | Status: SHIPPED | OUTPATIENT
Start: 2021-07-14 | End: 2021-10-22

## 2021-07-14 NOTE — PATIENT INSTRUCTIONS
Patient Education     Aftas    Un afta bucal es ange úlcera dolorosa que aparece en las sandoval de la boca. También se la llama úlcera aftosa. Causa más dolor aleksander los primeros días y dura aproximadamente entre 7 y 14 adela.   Causas  Las aftas no son lesiones de herpes ni pupas o fuegos. Además, no son contagiosas, por lo que no se transmiten por contacto. No se sabe con certeza por qué aparecen las aftas, andres existen varios factores que pueden desencadenar schafer aparición en diferentes personas.     Ange lesión leve, medardo andie morderse la parte interna de la boca, los labios o la mejilla, o realizarse procedimientos dentales    Estrés    Alimentación deficiente o falta de nutrientes, andie vitamina B y rex    Alimentos que pueden irritar la boca, andie tomate, frutas cítricas y algunos david secos (alimentos que son ácidos o contienen sustancias amargas llamadas  taninos )    Productos químicos irritantes, andie los que se encuentran en algunas pastas dentales y enjuagues bucales    Algunas enfermedades crónicas  Síntomas  Las aftas aparecen en las sandoval de la boca. Pueden aparecer dentro de las mejillas o de labios, en el paladar, en la base de las encías, en la lengua o en la parte posterior de la garganta. Tienen estas características:     Son pequeñas y ardha (no elevadas).    Pueden ser bultos blancos o amarillentos con el borde hamilton o tener ange aureola sj.    Normalmente tienen tamaño pequeño, forma redondeada y aparecen en grupos.    Producen dolor o ardor.  Las aftas no glenis cicatrices. Andres generalmente vuelven a aparecer.  Cuidados en el hogar  El objetivo del tratamiento de las aftas es disminuir el dolor, acelerar la curación y prevenir schafer reaparición. Ningún tratamiento único funciona para todas la personas. Intente diferentes métodos hasta encontrar el que funcione mejor.   Cuidados generales    Es probable que las comidas blandas y fáciles de masticar le produzcan menos dolor.    Beverly con  popote (pajita) para que los líquidos no pasen cerca de la úlcera.    Use un cepillo de dientes de cerdas suaves y cepíllese con suavidad.    No consuma alimentos ácidos, salados ni picantes. Pueden irritar las aftas.    No coma alimentos ásperos ni crocantes, andie el pan o las yared fritas crujientes. Estos tipos de alimentos pueden dañar la boca por dentro o raspar las aftas.  Medicamentos  Puede probar con medicamentos de venta jana que cubran las aftas para adormecer la shelley. Sirven para proteger las úlceras mientras sanan y ayudan a aliviar el dolor.   Enjuagues y soluciones hechos en casa  Puede usar estas soluciones andie enjuagues bucales. Escúpalas después de usarlas. También puede frotarlas suavemente en las úlceras. Puede repetir estos tratamientos tantas veces andie sea necesario.     Enjuáguese la boca con   cucharadita de sal disuelta en 1 vaso de agua tibia.    Mezcle cantidades iguales de agua oxigenada y agua. Puede usarlo andie enjuague bucal o frotarlo suavemente en las úlceras con un hisopo. También puede agregar bicarbonato de sodio a esta mezcla para hacer ange pasta y luego frotarla suavemente en las úlceras.  Visitas de control  Asista a los controles con schafer proveedor de atención médica según le hayan indicado. Si le hicieron un cultivo, puede llamar según le hayan indicado para pedir los resultados. Le avisarán si es necesario cambiar el tratamiento.   Cuándo llamar al 911  Llame al 911 si presenta cualquiera de estos síntomas:     Dificultad para respirar    Incapacidad para tragar    Somnolencia extrema o problemas para despertarse    Desmayos o pérdida del conocimiento    Frecuencia cardíaca acelerada    Convulsiones    Rigidez en el frankie  Cuándo buscar atención médica  Llame a schafer proveedor de atención médica de inmediato ante cualquiera de las siguientes situaciones:     Tiene fiebre de 100.4  F (38  C) o superior, según lo que le haya indicado schafer proveedor.    Está embarazada.    Le  hicieron recientemente ange cirugía, otro procedimiento médico o acaban de darle el josé antonio del hospital.    Es demasiado doloroso comer o tragar.    La úlcera no desaparece al cabo de 2 semanas.    [id number] [employer] Todos los derechos reservados. Esta información no pretende sustituir la atención médica profesional. Sólo schafer médico puede diagnosticar y tratar un problema de jeannette.

## 2021-09-27 ENCOUNTER — NURSE TRIAGE (OUTPATIENT)
Dept: NURSING | Facility: CLINIC | Age: 36
End: 2021-09-27

## 2021-09-27 NOTE — TELEPHONE ENCOUNTER
Writer spoke with pt with  #[id number]. Pt reports nasal congestion since 9/3/21. Pt denies fever, cough, shortness of breath or Covid exposure. Pt reports she has tried saline drops. Pt reports L side mild maxillary sinus pain. Pt denies erythema or swelling. Pt reports remote history of sinusitis, denies history of allergies.     Advised pt to use a neti pot a couple of times daily. See PCP within three days per protocol. Call back if worsening or new symptoms.    Pt verbalizes understanding and agrees to plan.     Reason for Disposition    [1] Sinus congestion (pressure, fullness) AND [2] present > 10 days    Additional Information    Negative: Severe difficulty breathing (e.g., struggling for each breath, speaks in single words)    Negative: Sounds like a life-threatening emergency to the triager    Negative: [1] Sinus infection AND [2] taking an antibiotic AND [3] symptoms continue    Negative: [1] Difficulty breathing AND [2] not from stuffy nose (e.g., not relieved by cleaning out the nose)    Negative: [1] SEVERE headache AND [2] fever    Negative: [1] Redness or swelling on the cheek, forehead or around the eye AND [2] fever    Negative: Fever > 104 F (40 C)    Negative: Patient sounds very sick or weak to the triager    Negative: [1] SEVERE pain AND [2] not improved 2 hours after pain medicine    Negative: [1] Redness or swelling on the cheek, forehead or around the eye AND [2] no fever    Negative: [1] Fever > 101 F (38.3 C) AND [2] age > 60    Negative: [1] Fever > 100.0 F (37.8 C) AND [2] bedridden (e.g., nursing home patient, CVA, chronic illness, recovering from surgery)    Negative: [1] Fever > 100.0 F (37.8 C) AND [2] diabetes mellitus or weak immune system (e.g., HIV positive, cancer chemo, splenectomy, organ transplant, chronic steroids)    Negative: Fever present > 3 days (72 hours)    Negative: [1] Fever returns after gone for over 24 hours AND [2] symptoms worse or not improved     Negative: [1] Sinus pain (not just congestion) AND [2] fever    Negative: Earache    Protocols used: SINUS PAIN OR CONGESTION-A-AH

## 2021-10-04 ENCOUNTER — TELEPHONE (OUTPATIENT)
Dept: FAMILY MEDICINE | Facility: CLINIC | Age: 36
End: 2021-10-04

## 2021-10-04 ENCOUNTER — VIRTUAL VISIT (OUTPATIENT)
Dept: FAMILY MEDICINE | Facility: CLINIC | Age: 36
End: 2021-10-04
Payer: COMMERCIAL

## 2021-10-04 DIAGNOSIS — J32.0 CHRONIC MAXILLARY SINUSITIS: Primary | ICD-10-CM

## 2021-10-04 PROCEDURE — 99213 OFFICE O/P EST LOW 20 MIN: CPT | Mod: 95 | Performed by: INTERNAL MEDICINE

## 2021-10-04 RX ORDER — FLUTICASONE PROPIONATE 50 MCG
2 SPRAY, SUSPENSION (ML) NASAL
COMMUNITY
Start: 2021-09-29 | End: 2021-10-22

## 2021-10-04 RX ORDER — PREDNISONE 20 MG/1
TABLET ORAL
COMMUNITY
Start: 2021-09-29 | End: 2021-10-22

## 2021-10-04 RX ORDER — LORATADINE 10 MG/1
10 TABLET ORAL
COMMUNITY
Start: 2021-09-29

## 2021-10-04 NOTE — PROGRESS NOTES
Jo Ann is a 36 year old who is being evaluated via a billable video visit.      How would you like to obtain your AVS? MyChart  If the video visit is dropped, the invitation should be resent by: Text to cell phone: [phone]  Will anyone else be joining your video visit? No      Video Start Time: 125PM    Assessment & Plan     Chronic maxillary sinusitis  Has been having issues with congested nose and nasal drainage for 1 month  Has been to the ER was given a course of steroids or nasal spray without much benefit  Mild fever of 100 degrees yesterday  No cough  No shortness of breath  No has pain on the left maxillary sinus area  Also has some headache  It appears like she has got sinusitis  This is been going on for almost 30 days now  Advised steam inhalation  We will try a course of antibiotics  Yamile pot  Reviewing 10 days if no better  - amoxicillin-clavulanate (AUGMENTIN) 875-125 MG tablet; Take 1 tablet by mouth 2 times daily                 Return in about 4 weeks (around 11/1/2021).    Faustino Russo MD  [facility]    Vivian Cherry is a 36 year old who presents for the following health issues accompanied by her  on the phone:    [facility]     ED/UC Followup:    Facility:  [facility]  Date of visit: 09/29/2021  Reason for visit: URI symptoms  Current Status: Worsened on the left side of the nose. Plugged/congestion and breathing through her mouth because of the congestion. Feeling sleepy and coughing. Worried about the length of time this has been going on for.           Review of Systems   Constitutional, HEENT, cardiovascular, pulmonary, gi and gu systems are negative, except as otherwise noted.      Objective           Vitals:  No vitals were obtained today due to virtual visit.    Physical Exam   GENERAL: Healthy, alert and no distress  EYES: Eyes grossly normal to inspection.  No discharge or erythema, or obvious  scleral/conjunctival abnormalities.  RESP: No audible wheeze, cough, or visible cyanosis.  No visible retractions or increased work of breathing.    SKIN: Visible skin clear. No significant rash, abnormal pigmentation or lesions.  NEURO: Cranial nerves grossly intact.  Mentation and speech appropriate for age.  PSYCH: Mentation appears normal, affect normal/bright, judgement and insight intact, normal speech and appearance well-groomed.                Video-Visit Details    Type of service:  Video Visit    Video End Time:135 PM    Originating Location (pt. Location): Home    Distant Location (provider location):  [facility]     Platform used for Video Visit: [employer]

## 2021-10-04 NOTE — PATIENT INSTRUCTIONS
Patient Education     When to Use Antibiotics    Antibiotics are medicines used to treat infections caused by bacteria. They don t work for an illness caused by a virus. And they don't work for an allergic reaction. In fact, taking antibiotics for reasons other than an infection by bacteria can cause problems. You may have side effects from the medicine. And if you need an antibiotic in the future, it may not work well. This is because the bacteria can become immune to the medicine. You can also get a type of diarrhea that's hard to treat. This diarrhea is called C. diff.   When antibiotics likely won t help  Your healthcare provider won t usually give you antibiotics for the conditions listed below. You can help by not asking for them if you have:     A cold. This type of illness is caused by a virus. It can cause a runny nose, stuffed-up nose, sneezing, coughing, and headache. You may also have mild body aches and low fever. A cold gets better on its own in a few days to a week.    The flu (influenza). This is a respiratory illness caused by a virus. The flu usually goes away on its own in a week or so. It can cause fever, body aches, sore throat, and tiredness.    Bronchitis. This is an infection in the lungs. It is most often caused by a virus. You may have coughing, phlegm, body aches, and a low fever. A common type of bronchitis is known as a chest cold. This is called acute bronchitis. This often happens after you have a respiratory infection like a cold. Bronchitis can take weeks to go away. Antibiotics often don t help.    Most sore throats. Sore throats are most often caused by viruses. Your throat may feel scratchy or achy. It may hurt to swallow. You may also have a low fever and body aches. A sore throat usually gets better in a few days.    Most outer ear infections. An ear infection may be caused by a virus or bacteria. It causes pain in the ear. Antibiotics by mouth usually don t help. Low-dose  antibiotic ear drops work much better.    Some inner ear infections. An inner ear infection (otitis media) can be caused by a virus in the ear. It can also cause pain and a high fever. Most older children with low-grade fever don't need to be treated with antibiotics.    Most sinus infections. This is also known as sinusitis. This kind of infection causes sinus pain and swelling, and a runny nose. In most cases, it goes away on its own. Antibiotics don t make recovery quicker.    Allergic rhinitis. This is a set of symptoms caused by an allergic reaction. You may have sneezing, a runny nose, itchy or watery eyes, or a sore throat. Allergies are not treated with antibiotics.    Low fever. A mild fever that s less than 100.4 F (38 C) most likely doesn t need to be treated with antibiotics.   When antibiotics can help  Antibiotics can be used to treat:                                                       Strep throat. This is a throat infection caused by a certain type of bacteria. Symptoms of strep throat include a sore throat, white patches on the tonsils, red spots on the roof of the mouth, fever, body aches, and nausea and vomiting. Strep throat almost never causes a cough.    Urinary tract infection (UTI). This is an infection of the bladder and the tube that takes urine out of the body. It is caused by bacteria. It can cause burning pain and urine that s cloudy or tinted with blood. UTIs are very common. Antibiotics usually help treat them.    Some outer ear infections. In some cases, a healthcare provider may prescribe antibiotics by mouth for an ear infection. You may need a test to show the cause of the ear infection.    Some sinus infections. In some cases, your healthcare provider may give you antibiotics. He or she may first need to make sure your symptoms aren t caused by something else. This may be a virus, fungus, allergies, or air pollutants such as smoke.   Your healthcare provider may give you  antibiotics if you have a condition that can affect your immune system. This includes diabetes or cancer.  Self-care at home  If your infection can t be treated with antibiotics, you can take other steps to feel better. Try the remedies below. In general:     Rest and sleep as much as needed.    Drink water and other clear fluids.    Don t smoke. Stay away from smoke from other people.    Use over-the-counter medicine such as acetaminophen or ibuprofen to ease pain or fever, as directed by your healthcare provider.  To treat sinus pain or nasal stuffiness:    Put a warm, moist cloth on your face where you feel sinus pain or pressure.    Try a nasal spray with medicine or saline. Use as directed by your healthcare provider.    Breathe in steam from a hot shower.    Use a humidifier or cool mist vaporizer.   To quiet a cough:     Use a humidifier or cool mist vaporizer.    Breathe in steam from a hot shower.    Suck on cough lozenges.   To sooth a sore throat:     Suck on ice chips, frozen ice pops, or lozenges.    Use a sore throat spray.    Use a humidifier or cool mist vaporizer.    Gargle with saltwater.    Drink warm liquids.    Take ibuprofen to reduce swelling and pain.  To ease ear pain:     Hold a warm, moist washcloth on the ear for 10 minutes at a time.  [employer] last reviewed this educational content on 12/1/2019 2000-2021 [employer]. All rights reserved. This information is not intended as a substitute for professional medical care. Always follow your healthcare professional's instructions.           Patient Education     Sinusitis (Antibiotic Treatment)    The sinuses are air-filled spaces within the bones of the face. They connect to the inside of the nose. Sinusitis is an inflammation of the tissue that lines the sinuses. Sinusitis can occur during a cold. It can also happen due to allergies to pollens and other particles in the air. Sinusitis can cause symptoms of sinus congestion  and a feeling of fullness. A sinus infection causes fever, headache, and facial pain. There is often green or yellow fluid draining from the nose or into the back of the throat (post-nasal drip). You have been given antibiotics to treat this condition.   Home care    Take the full course of antibiotics as instructed. Don't stop taking them, even when you feel better.    Drink plenty of water, hot tea, and other liquids as directed by the healthcare provider. This may help thin nasal mucus. It also may help your sinuses drain fluids.    Heat may help soothe painful areas of your face. Use a towel soaked in hot water. Or,  the shower and direct the warm spray onto your face. Using a vaporizer along with a menthol rub at night may also help soothe symptoms.     An expectorant with guaifenesin may help thin nasal mucus and help your sinuses drain fluids. Talk with your provider or pharmacists before taking an over-the-counter (OTC) medicine if you have any questions about it or its side effects..    You can use an OTC decongestant, unless a similar medicine was prescribed to you. Nasal sprays work the fastest. Use one that contains phenylephrine or oxymetazoline. First blow your nose gently. Then use the spray. Don't use these medicines more often than directed on the label. If you do, your symptoms may get worse. You may also take pills that contain pseudoephedrine. Don t use products that combine multiple medicines. This is because side effects may be increased. Read labels. You can also ask the pharmacist for help. (People with high blood pressure should not use decongestants. They can raise blood pressure.) Talk with your provider or pharmacist if you have any questions about the medicine..    OTC antihistamines may help if allergies contributed to your sinusitis. Talk with your provider or pharmacist if you have any questions about the medicine..    Don't use nasal rinses or irrigation during an acute sinus  infection, unless your healthcare provider tells you to. Rinsing may spread the infection to other areas in your sinuses.    Use acetaminophen or ibuprofen to control pain, unless another pain medicine was prescribed to you. If you have chronic liver or kidney disease or ever had a stomach ulcer, talk with your healthcare provider before using these medicines. Never give aspirin to anyone under age 18 who is ill with a fever. It may cause severe liver damage.    Don't smoke. This can make symptoms worse.    Follow-up care  Follow up with your healthcare provider, or as advised.   When to seek medical advice  Call your healthcare provider if any of these occur:     Facial pain or headache that gets worse    Stiff neck    Unusual drowsiness or confusion    Swelling of your forehead or eyelids    Symptoms don't go away in 10 days    Vision problems, such as blurred or double vision    Fever of 100.4 F (38 C) or higher, or as directed by your healthcare provider  Call 911  Call 911 if any of these occur:     Seizure    Trouble breathing    Feeling dizzy or faint    Fingernails, skin or lips look blue, purple , or gray  Prevention  Here are steps you can take to help prevent an infection:     Keep good hand washing habits.    Don t have close contact with people who have sore throats, colds, or other upper respiratory infections.    Don t smoke, and stay away from secondhand smoke.    Stay up to date with of your vaccines.  [employer] last reviewed this educational content on 12/1/2019 2000-2021 [employer]. All rights reserved. This information is not intended as a substitute for professional medical care. Always follow your healthcare professional's instructions.           Patient Education     Self-Care for Sinusitis  Sinusitis can often be managed with self-care. Self-care can keep sinuses moist and make you feel more comfortable. Remember to follow your doctor's instructions closely. This can make a  big difference in getting your sinus problem under control.   Drink fluids  Drinking extra fluids helps thin your mucus. This lets it drain from your sinuses more easily. Have a glass of water every hour or two. A humidifier helps in much the same way. Fluids can also offset the drying effects of certain medicines. If you use a humidifier, follow the product maker's instructions on how to use it. Clean it on a regular schedule.      Drinking plenty of water can help sinuses drain.   Use saltwater rinses  Rinses help keep your sinuses and nose moist. Mix a teaspoon of salt in 8 ounces of fresh, warm water. Use a bulb syringe to gently squirt the water into your nose a few times a day. You can also buy ready-made saline nasal sprays.   Apply hot or cold packs  Applying heat to the area surrounding your sinuses may make you feel more comfortable. Use a hot water bottle or a hand towel dipped in hot water. Some people also find ice packs effective for relieving pain.   Medicines  Your doctor may prescribe medicines to help treat your sinusitis. If you have an infection, antibiotics can help clear it up. If you are prescribed antibiotics, take all pills on schedule until they are gone, even if you feel better. Decongestants help relieve swelling. Use decongestant sprays for short periods only under the direction of your healthcare provider. If you have allergies, your doctor may prescribe medicines to help relieve them.   [employer] last reviewed this educational content on 9/1/2019 2000-2021 [employer]. All rights reserved. This information is not intended as a substitute for professional medical care. Always follow your healthcare professional's instructions.

## 2021-10-04 NOTE — TELEPHONE ENCOUNTER
[place],  is obtained.  Patient reports went to emergency department 9/29/21, having difficulty with allergies.  Patient was prescribe prednisone, Loratadine and Nasal steroid, however symptoms have not improved.  ? If patient took Amoxicillin.  At time of emergency department visit, symptoms x 26 days.  Today is day 31.  Temperature today is 100.  Last night was unable to sleep.  Difficulty breathing out of left side of nose.  Symptoms are Nasal congestion, PND.  Denies cough, shortness of breath, wheezing.    Advise evaluation.    Virtual visit is scheduled for today.  Patient/parent verbalized understanding of instructions provided and agreed with the plan of care  Shelli Broussard RN

## 2021-10-09 ENCOUNTER — HEALTH MAINTENANCE LETTER (OUTPATIENT)
Age: 36
End: 2021-10-09

## 2021-10-10 DIAGNOSIS — Z13.220 SCREENING FOR HYPERLIPIDEMIA: ICD-10-CM

## 2021-10-10 DIAGNOSIS — Z13.1 SCREENING FOR DIABETES MELLITUS (DM): ICD-10-CM

## 2021-10-10 DIAGNOSIS — Z11.59 NEED FOR HEPATITIS C SCREENING TEST: Primary | ICD-10-CM

## 2021-10-16 NOTE — PROGRESS NOTES
SUBJECTIVE:   CC: Jo Ann Patel is an 36 year old woman who presents for preventive health visit.   Patient is here for annual physical and with other concerns as mentioned below    Left sinusitis-patient had history of Covid infection in June 2021.  Has been having pain over the left forehead, left cheek associated with mucus drainage from the nostrils, postnasal drip, intermittent cough with no concerns for fever, chills, ear symptoms, sore throat, wheezing, difficulty breathing for the past 7+ weeks  Had a virtual visit with Dr. Russo on 10/04/21, was treated with a course of Augmentin with no relief of symptoms  Patient has history of allergic rhinitis, is currently not using antihistamines or Flonase nasal spray  Has no history of asthma, smoking      Patient has been advised of split billing requirements and indicates understanding: Yes  Healthy Habits:     Getting at least 3 servings of Calcium per day:  Yes    Bi-annual eye exam:  Yes    Dental care twice a year:  NO    Sleep apnea or symptoms of sleep apnea:  None    Diet:  Regular (no restrictions)    Frequency of exercise:  None    Taking medications regularly:  Yes    Medication side effects:  None    PHQ-2 Total Score: 0    Additional concerns today:  No              Today's PHQ-2 Score:   PHQ-2 ( 1999 Pfizer) 10/22/2021   Q1: Little interest or pleasure in doing things 0   Q2: Feeling down, depressed or hopeless 0   PHQ-2 Score 0   Q1: Little interest or pleasure in doing things Not at all   Q2: Feeling down, depressed or hopeless Not at all   PHQ-2 Score 0       Abuse: Current or Past (Physical, Sexual or Emotional) - No  Do you feel safe in your environment? Yes    Have you ever done Advance Care Planning? (For example, a Health Directive, POLST, or a discussion with a medical provider or your loved ones about your wishes): No, advance care planning information given to patient to review.  Patient declined advance care planning  discussion at this time.    Social History     Tobacco Use     Smoking status: Never Smoker     Smokeless tobacco: Never Used   Substance Use Topics     Alcohol use: No     If you drink alcohol do you typically have >3 drinks per day or >7 drinks per week? No    Alcohol Use 10/22/2021   Prescreen: >3 drinks/day or >7 drinks/week? No   Prescreen: >3 drinks/day or >7 drinks/week? -   No flowsheet data found.    Reviewed orders with patient.  Reviewed health maintenance and updated orders accordingly - Yes  Lab work is in process  Labs reviewed in EPIC  BP Readings from Last 3 Encounters:   10/22/21 100/66   21 100/62   20 105/67    Wt Readings from Last 3 Encounters:   10/22/21 57.5 kg (126 lb 12.8 oz)   21 58.7 kg (129 lb 8 oz)   20 60.6 kg (133 lb 11.2 oz)                  Patient Active Problem List   Diagnosis     H. pylori infection     Gastroesophageal reflux disease, esophagitis presence not specified     Hepatic steatosis     Non-ulcer dyspepsia     Gallbladder sludge     Itchy eyes     Allergic conjunctivitis, right     Acne rosacea     Flu vaccine need     Patient desires vaginal birth after  section ()     Past Surgical History:   Procedure Laterality Date     APPENDECTOMY        SECTION N/A 7/3/2017    Procedure:  SECTION;  Primary  Section ;  Surgeon: Luz Maria Kwan MD;  Location: [facility]     HC INSERTION INTRAUTERINE DEVICE       HC REMOVE INTRAUTERINE DEVICE         Social History     Tobacco Use     Smoking status: Never Smoker     Smokeless tobacco: Never Used   Substance Use Topics     Alcohol use: No     Family History   Problem Relation Age of Onset     Cerebrovascular Disease Maternal Grandfather      Heart Disease Maternal Grandfather      Other - See Comments Daughter         Hydrocephaly         Current Outpatient Medications   Medication Sig Dispense Refill     cefuroxime (CEFTIN) 500 MG tablet Take 1 tablet (500  mg) by mouth 2 times daily for 10 days 20 tablet 0     fluticasone (FLONASE) 50 MCG/ACT nasal spray Spray 2 sprays into both nostrils daily 16 g 3     loratadine (CLARITIN) 10 MG tablet Take 10 mg by mouth       olopatadine (PATANOL) 0.1 % ophthalmic solution Place 1 drop Into the left eye 2 times daily 5 mL 1     predniSONE (DELTASONE) 20 MG tablet Take 1 tablet (20 mg) by mouth daily 5 tablet 0     No Known Allergies  Recent Labs   Lab Test 10/20/21  0710 10/31/18  1552 18  0929 17  2215 17  2215   LDL 92  --  65  --   --    HDL 58  --  47*  --   --    TRIG 89  --  34  --   --    ALT  --  84*  --   --  12   CR 0.70 0.77  --    < > 0.71   GFRESTIMATED >90 87  --    < > >90  Non  GFR Calc     GFRESTBLACK  --  >90  --   --  >90  African American GFR Calc     POTASSIUM 3.8 3.6  --    < > 4.2   TSH  --   --  1.89  --   --     < > = values in this interval not displayed.        Breast Cancer Screening:  Any new diagnosis of family breast, ovarian, or bowel cancer? No    FHS-7: No flowsheet data found.  click delete button to remove this line now  Patient under 40 years of age: Routine Mammogram Screening not recommended.   Pertinent mammograms are reviewed under the imaging tab.    History of abnormal Pap smear: NO - age 30-65 PAP every 5 years with negative HPV co-testing recommended  PAP / HPV Latest Ref Rng & Units 2020   PAP (Historical) - NIL NIL   HPV16 NEG:Negative Negative Negative   HPV18 NEG:Negative Negative Negative   HRHPV NEG:Negative Negative Negative     Reviewed and updated as needed this visit by clinical staff  Tobacco  Allergies  Meds   Med Hx  Surg Hx  Fam Hx  Soc Hx        Reviewed and updated as needed this visit by Provider                  Past Medical History:   Diagnosis Date     Acne vulgaris      Hepatic steatosis 2019      Past Surgical History:   Procedure Laterality Date     APPENDECTOMY  1999      SECTION N/A 7/3/2017     Procedure:  SECTION;  Primary  Section ;  Surgeon: Luz Maria Kwan MD;  [facility]     HC INSERTION INTRAUTERINE DEVICE       HC REMOVE INTRAUTERINE DEVICE  2009     OB History    Para Term  AB Living   3 3 3 0 0 3   SAB TAB Ectopic Multiple Live Births   0 0 0 0 3      # Outcome Date GA Lbr Amador/2nd Weight Sex Delivery Anes PTL Lv   3 Term 20 39w6d  3.43 kg (7 lb 9 oz) F   N SUSHILA      Apgar1: 8  Apgar5: 9   2 Term 17 39w2d  3.75 kg (8 lb 4.3 oz) F CS-LTranv Spinal  SUSHILA      Birth Comments: 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 Kayden Rubio) and neurology as outpatient - saw in hospital  Passed hearing screen and CCHD  Hep B and Vitamin K given      Complications: Fetal hydrocephalus affecting management of mother in thomas pregnancy      Name: Dina      Apgar1: 8  Apgar5: 9   1 Term 08 40w0d  2.722 kg (6 lb) F    SUSHILA      Name: Soumya       Review of Systems   Constitutional: Negative for chills and fever.   HENT: Negative for ear pain, hearing loss and sore throat.    Eyes: Negative for pain and visual disturbance.   Respiratory: Negative for shortness of breath.    Cardiovascular: Positive for chest pain. Negative for palpitations and peripheral edema.   Gastrointestinal: Negative for abdominal pain, constipation, diarrhea, heartburn, hematochezia and nausea.   Breasts:  Negative for tenderness, breast mass and discharge.   Genitourinary: Negative for dysuria, frequency, genital sores, hematuria, pelvic pain, urgency, vaginal bleeding and vaginal discharge.   Musculoskeletal: Positive for myalgias. Negative for arthralgias and joint swelling.   Skin: Negative for rash.   Neurological: Positive for headaches. Negative for dizziness, weakness and paresthesias.   Psychiatric/Behavioral: Positive for mood changes. The patient is not nervous/anxious.      CONSTITUTIONAL:  "NEGATIVE for fever, chills, change in weight  INTEGUMENTARU/SKIN: NEGATIVE for worrisome rashes, moles or lesions  EYES: NEGATIVE for vision changes or irritation  ENT: as above  RESP:as above  BREAST: NEGATIVE for masses, tenderness or discharge  CV: NEGATIVE for chest pain, palpitations or peripheral edema  GI: NEGATIVE for nausea, abdominal pain, heartburn, or change in bowel habits  : NEGATIVE for unusual urinary or vaginal symptoms. Periods are regular.  MUSCULOSKELETAL: NEGATIVE for significant arthralgias or myalgia  NEURO: NEGATIVE for weakness, dizziness or paresthesias  ENDOCRINE: NEGATIVE for temperature intolerance, skin/hair changes  HEME/ALLERGY/IMMUNE: NEGATIVE for bleeding problems  PSYCHIATRIC: NEGATIVE for changes in mood or affect     OBJECTIVE:   /66   Pulse 67   Temp 97.1  F (36.2  C) (Tympanic)   Resp 14   Ht 1.511 m (4' 11.5\")   Wt 57.5 kg (126 lb 12.8 oz)   LMP 09/29/2021 (Approximate)   SpO2 99%   BMI 25.18 kg/m    Physical Exam  GENERAL: healthy, alert and no distress  EYES: Eyes grossly normal to inspection, PERRL and conjunctivae and sclerae normal  HENT: normal cephalic/atraumatic, both ears: mucopurulent effusion, nose and mouth without ulcers or lesions, oropharynx clear, oral mucous membranes moist and sinuses: maxillary, frontal, ethmoid tenderness on left  NECK: no adenopathy, no asymmetry, masses, or scars and thyroid normal to palpation  RESP: lungs clear to auscultation - no rales, rhonchi or wheezes  BREAST: normal without masses, tenderness or nipple discharge and no palpable axillary masses or adenopathy  CV: regular rate and rhythm, normal S1 S2, no S3 or S4, no murmur, click or rub, no peripheral edema and peripheral pulses strong  ABDOMEN: soft, nontender, no hepatosplenomegaly, no masses and bowel sounds normal  MS: no gross musculoskeletal defects noted, no edema  SKIN: no suspicious lesions or rashes  NEURO: Normal strength and tone, mentation intact and " speech normal  PSYCH: mentation appears normal, affect normal/bright    Diagnostic Test Results:  Labs reviewed in Epic    ASSESSMENT/PLAN:   (Z00.00) Routine general medical examination at a health care facility  (primary encounter diagnosis)  Comment:   Plan: Discussed on regular exercises, healthy eating, self breast exams monthly and routine dental checks.        (J32.0) Left maxillary sinusitis  Comment:   Plan: cefuroxime (CEFTIN) 500 MG tablet, predniSONE         (DELTASONE) 20 MG tablet        Recommended patient to start back on over-the-counter Claritin 10 mg once daily along with Flonase nasal sprays  Due to ongoing symptoms with no improvement, will start on prednisone 20 mg once daily for 5 days along with Ceftin 500 mg twice a day for 10 days  If no improvement noted in 2 weeks, patient understands to follow-up through virtual visit for further evaluation and management  Dosing and potential medication side effects discussed.  Patient verbalised understanding and is agreeable to the plan.      (H10.12) Allergic conjunctivitis, left  Comment:   Plan: olopatadine (PATANOL) 0.1 % ophthalmic solution            (J30.9) Chronic allergic rhinitis  Comment:   Plan: fluticasone (FLONASE) 50 MCG/ACT nasal spray            (Z12.4) Cervical cancer screening  Comment:   Plan: Patient is not due for the Pap this year    (Z13.6) CARDIOVASCULAR SCREENING; LDL GOAL LESS THAN 160  Comment:   Plan:   LDL Cholesterol Calculated   Date Value Ref Range Status   10/20/2021 92 <=100 mg/dL Final   08/27/2018 65 <100 mg/dL Final     Comment:     Desirable:       <100 mg/dl         Patient has been advised of split billing requirements and indicates understanding: Yes  COUNSELING:  Reviewed preventive health counseling, as reflected in patient instructions  Special attention given to:        Regular exercise       Healthy diet/nutrition       Vision screening       Immunizations    Declined: Influenza due to Other                " Contraception       Family planning       Folic Acid       Consider Hep C screening for all patients one time for ages 18-79 years    Estimated body mass index is 25.18 kg/m  as calculated from the following:    Height as of this encounter: 1.511 m (4' 11.5\").    Weight as of this encounter: 57.5 kg (126 lb 12.8 oz).    Weight management plan: Discussed healthy diet and exercise guidelines    She reports that she has never smoked. She has never used smokeless tobacco.      Counseling Resources:  ATP IV Guidelines  Pooled Cohorts Equation Calculator  Breast Cancer Risk Calculator  BRCA-Related Cancer Risk Assessment: FHS-7 Tool  FRAX Risk Assessment  ICSI Preventive Guidelines  Dietary Guidelines for Americans, 2010  USDA's MyPlate  ASA Prophylaxis  Lung CA Screening    Robson Wilson MD  [facility]  Chart documentation done in part with Dragon Voice recognition Software. Although reviewed after completion, some word and grammatical error may remain.    "

## 2021-10-20 ENCOUNTER — LAB (OUTPATIENT)
Dept: LAB | Facility: CLINIC | Age: 36
End: 2021-10-20
Payer: COMMERCIAL

## 2021-10-20 DIAGNOSIS — Z13.1 SCREENING FOR DIABETES MELLITUS (DM): ICD-10-CM

## 2021-10-20 DIAGNOSIS — Z11.59 NEED FOR HEPATITIS C SCREENING TEST: ICD-10-CM

## 2021-10-20 DIAGNOSIS — Z13.220 SCREENING FOR HYPERLIPIDEMIA: ICD-10-CM

## 2021-10-20 LAB
ANION GAP SERPL CALCULATED.3IONS-SCNC: 4 MMOL/L (ref 3–14)
BUN SERPL-MCNC: 11 MG/DL (ref 7–30)
CALCIUM SERPL-MCNC: 8.7 MG/DL (ref 8.5–10.1)
CHLORIDE BLD-SCNC: 108 MMOL/L (ref 94–109)
CHOLEST SERPL-MCNC: 168 MG/DL
CO2 SERPL-SCNC: 27 MMOL/L (ref 20–32)
CREAT SERPL-MCNC: 0.7 MG/DL (ref 0.52–1.04)
FASTING STATUS PATIENT QL REPORTED: YES
GFR SERPL CREATININE-BSD FRML MDRD: >90 ML/MIN/1.73M2
GLUCOSE BLD-MCNC: 90 MG/DL (ref 70–99)
HCV AB SERPL QL IA: NONREACTIVE
HDLC SERPL-MCNC: 58 MG/DL
LDLC SERPL CALC-MCNC: 92 MG/DL
NONHDLC SERPL-MCNC: 110 MG/DL
POTASSIUM BLD-SCNC: 3.8 MMOL/L (ref 3.4–5.3)
SODIUM SERPL-SCNC: 139 MMOL/L (ref 133–144)
TRIGL SERPL-MCNC: 89 MG/DL

## 2021-10-20 PROCEDURE — 80061 LIPID PANEL: CPT

## 2021-10-20 PROCEDURE — 86803 HEPATITIS C AB TEST: CPT

## 2021-10-20 PROCEDURE — 36415 COLL VENOUS BLD VENIPUNCTURE: CPT

## 2021-10-20 PROCEDURE — 80048 BASIC METABOLIC PNL TOTAL CA: CPT

## 2021-10-22 ENCOUNTER — OFFICE VISIT (OUTPATIENT)
Dept: FAMILY MEDICINE | Facility: CLINIC | Age: 36
End: 2021-10-22
Payer: COMMERCIAL

## 2021-10-22 VITALS
SYSTOLIC BLOOD PRESSURE: 100 MMHG | OXYGEN SATURATION: 99 % | HEIGHT: 60 IN | TEMPERATURE: 97.1 F | DIASTOLIC BLOOD PRESSURE: 66 MMHG | HEART RATE: 67 BPM | WEIGHT: 126.8 LBS | RESPIRATION RATE: 14 BRPM | BODY MASS INDEX: 24.9 KG/M2

## 2021-10-22 DIAGNOSIS — Z00.00 ROUTINE GENERAL MEDICAL EXAMINATION AT A HEALTH CARE FACILITY: Primary | ICD-10-CM

## 2021-10-22 DIAGNOSIS — H10.12 ALLERGIC CONJUNCTIVITIS, LEFT: ICD-10-CM

## 2021-10-22 DIAGNOSIS — Z12.4 CERVICAL CANCER SCREENING: ICD-10-CM

## 2021-10-22 DIAGNOSIS — J32.0 LEFT MAXILLARY SINUSITIS: ICD-10-CM

## 2021-10-22 DIAGNOSIS — Z13.6 CARDIOVASCULAR SCREENING; LDL GOAL LESS THAN 160: ICD-10-CM

## 2021-10-22 DIAGNOSIS — J30.9 CHRONIC ALLERGIC RHINITIS: ICD-10-CM

## 2021-10-22 PROCEDURE — 99395 PREV VISIT EST AGE 18-39: CPT | Performed by: FAMILY MEDICINE

## 2021-10-22 PROCEDURE — 99213 OFFICE O/P EST LOW 20 MIN: CPT | Mod: 25 | Performed by: FAMILY MEDICINE

## 2021-10-22 RX ORDER — FLUTICASONE PROPIONATE 50 MCG
2 SPRAY, SUSPENSION (ML) NASAL DAILY
Qty: 16 G | Refills: 3 | Status: SHIPPED | OUTPATIENT
Start: 2021-10-22 | End: 2023-09-26

## 2021-10-22 RX ORDER — OLOPATADINE HYDROCHLORIDE 1 MG/ML
1 SOLUTION/ DROPS OPHTHALMIC 2 TIMES DAILY
Qty: 5 ML | Refills: 1 | Status: SHIPPED | OUTPATIENT
Start: 2021-10-22 | End: 2022-12-12

## 2021-10-22 RX ORDER — PREDNISONE 20 MG/1
20 TABLET ORAL DAILY
Qty: 5 TABLET | Refills: 0 | Status: SHIPPED | OUTPATIENT
Start: 2021-10-22 | End: 2022-01-31

## 2021-10-22 RX ORDER — CEFUROXIME AXETIL 500 MG/1
500 TABLET ORAL 2 TIMES DAILY
Qty: 20 TABLET | Refills: 0 | Status: SHIPPED | OUTPATIENT
Start: 2021-10-22 | End: 2021-11-01

## 2021-10-22 ASSESSMENT — ENCOUNTER SYMPTOMS
SHORTNESS OF BREATH: 0
EYE PAIN: 0
HEMATURIA: 0
SORE THROAT: 0
CONSTIPATION: 0
FEVER: 0
DYSURIA: 0
HEADACHES: 1
DIARRHEA: 0
NERVOUS/ANXIOUS: 0
PALPITATIONS: 0
FREQUENCY: 0
JOINT SWELLING: 0
WEAKNESS: 0
BREAST MASS: 0
HEMATOCHEZIA: 0
ARTHRALGIAS: 0
PARESTHESIAS: 0
ABDOMINAL PAIN: 0
DIZZINESS: 0
NAUSEA: 0
CHILLS: 0
MYALGIAS: 1
HEARTBURN: 0

## 2021-10-22 ASSESSMENT — PAIN SCALES - GENERAL: PAINLEVEL: SEVERE PAIN (6)

## 2021-10-22 ASSESSMENT — MIFFLIN-ST. JEOR: SCORE: 1178.72

## 2021-11-09 ENCOUNTER — APPOINTMENT (OUTPATIENT)
Dept: INTERPRETER SERVICES | Facility: CLINIC | Age: 36
End: 2021-11-09
Payer: COMMERCIAL

## 2022-01-31 ENCOUNTER — OFFICE VISIT (OUTPATIENT)
Dept: OPTOMETRY | Facility: CLINIC | Age: 37
End: 2022-01-31
Payer: COMMERCIAL

## 2022-01-31 ENCOUNTER — APPOINTMENT (OUTPATIENT)
Dept: OPTOMETRY | Facility: CLINIC | Age: 37
End: 2022-01-31
Payer: COMMERCIAL

## 2022-01-31 DIAGNOSIS — H10.13 ALLERGIC CONJUNCTIVITIS OF BOTH EYES: ICD-10-CM

## 2022-01-31 DIAGNOSIS — Z01.00 EXAMINATION OF EYES AND VISION: Primary | ICD-10-CM

## 2022-01-31 DIAGNOSIS — H04.123 DRY EYE SYNDROME OF BOTH EYES: ICD-10-CM

## 2022-01-31 DIAGNOSIS — H52.13 MYOPIA OF BOTH EYES: ICD-10-CM

## 2022-01-31 DIAGNOSIS — H52.221 REGULAR ASTIGMATISM OF RIGHT EYE: ICD-10-CM

## 2022-01-31 PROCEDURE — V2100 LENS SPHER SINGLE PLANO 4.00: HCPCS | Mod: RA | Performed by: OPTOMETRIST

## 2022-01-31 PROCEDURE — 92014 COMPRE OPH EXAM EST PT 1/>: CPT | Performed by: OPTOMETRIST

## 2022-01-31 PROCEDURE — V2020 VISION SVCS FRAMES PURCHASES: HCPCS | Mod: RA | Performed by: OPTOMETRIST

## 2022-01-31 PROCEDURE — 92015 DETERMINE REFRACTIVE STATE: CPT | Performed by: OPTOMETRIST

## 2022-01-31 RX ORDER — OLOPATADINE HYDROCHLORIDE 2 MG/ML
1 SOLUTION/ DROPS OPHTHALMIC DAILY
Qty: 2.5 ML | Refills: 11 | Status: SHIPPED | OUTPATIENT
Start: 2022-01-31 | End: 2023-01-31

## 2022-01-31 RX ORDER — CARBOXYMETHYLCELLULOSE SODIUM 5 MG/ML
1 SOLUTION/ DROPS OPHTHALMIC 4 TIMES DAILY
Qty: 15 ML | Refills: 11 | Status: SHIPPED | OUTPATIENT
Start: 2022-01-31 | End: 2023-01-31

## 2022-01-31 ASSESSMENT — VISUAL ACUITY
OD_SC: 20/20
METHOD: SNELLEN - LINEAR
OD_CC: 20/20
OD_SC: 20/20
OS_CC: 20/20
OS_SC: 20/20
OS_SC+: -1
OS_SC: 20/20

## 2022-01-31 ASSESSMENT — REFRACTION_MANIFEST
OS_AXIS: 074
OS_CYLINDER: +0.25
OD_CYLINDER: +0.50
OS_SPHERE: -0.75
OD_AXIS: 105
OD_SPHERE: -0.75

## 2022-01-31 ASSESSMENT — SLIT LAMP EXAM - LIDS
COMMENTS: MEIBOMIAN GLAND DYSFUNCTION
COMMENTS: MEIBOMIAN GLAND DYSFUNCTION

## 2022-01-31 ASSESSMENT — REFRACTION_WEARINGRX
OD_SPHERE: -0.50
OD_AXIS: 105
OS_SPHERE: -0.25
OD_CYLINDER: +0.50

## 2022-01-31 ASSESSMENT — EXTERNAL EXAM - RIGHT EYE: OD_EXAM: NORMAL

## 2022-01-31 ASSESSMENT — CUP TO DISC RATIO
OD_RATIO: 0.5
OS_RATIO: 0.4

## 2022-01-31 ASSESSMENT — TONOMETRY
OD_IOP_MMHG: 16
OS_IOP_MMHG: 18
IOP_METHOD: TONOPEN

## 2022-01-31 ASSESSMENT — KERATOMETRY
OD_K2POWER_DIOPTERS: 44.00
OS_K1POWER_DIOPTERS: 43.25
OS_K2POWER_DIOPTERS: 44.00
OD_K1POWER_DIOPTERS: 43.25
OD_AXISANGLE2_DEGREES: 002
OS_AXISANGLE2_DEGREES: 173
OS_AXISANGLE_DEGREES: 083
OD_AXISANGLE_DEGREES: 092

## 2022-01-31 ASSESSMENT — CONF VISUAL FIELD
OS_NORMAL: 1
OD_NORMAL: 1

## 2022-01-31 ASSESSMENT — EXTERNAL EXAM - LEFT EYE: OS_EXAM: NORMAL

## 2022-01-31 NOTE — PROGRESS NOTES
Chief Complaint   Patient presents with     Annual Eye Exam      Accompanied by daughter  Last Eye Exam: 9-  Dilated Previously: Yes    What are you currently using to see?  glasses       Distance Vision Acuity: Satisfied with vision    Near Vision Acuity: Not satisfied     Eye Comfort: watery and itchy and red   Do you use eye drops? : Yes: patanol sometimes - sometimes washes eyelids with baby shampoo  Occupation or Hobbies: stay at home     Faye Duarte Optometric Assistant, A.B.O.C.          Medical, surgical and family histories reviewed and updated 1/31/2022.       OBJECTIVE: See Ophthalmology exam    ASSESSMENT:    ICD-10-CM    1. Examination of eyes and vision  Z01.00 EYE EXAM (SIMPLE-NONBILLABLE)   2. Regular astigmatism of right eye  H52.221 REFRACTION   3. Myopia of both eyes  H52.13 REFRACTION   4. Allergic conjunctivitis of both eyes  H10.13 EYE EXAM (SIMPLE-NONBILLABLE)     olopatadine (PATADAY) 0.2 % ophthalmic solution   5. Dry eye syndrome of both eyes  H04.123 EYE EXAM (SIMPLE-NONBILLABLE)     carboxymethylcellulose (REFRESH PLUS) 0.5 % SOLN ophthalmic solution      PLAN:     Patient Instructions   Eyeglass prescription given.     Pataday to be used once daily for itchy eyes.  Use as needed. Contact your pharmacy for refills.    Refresh tears 1 drop 2-4x daily.      Heat to the eyes daily for 10-15 minutes nightly with warm washcloth or reusable gel masks from the pharmacy or  Offermatic heat masks can be purchased at [employer].    Lid scrubs with baby shampoo at night.    Return in 1 year for a complete eye exam or sooner if needed.    Luis Iniguez, OD

## 2022-01-31 NOTE — PATIENT INSTRUCTIONS
Eyeglass prescription given.     Pataday to be used once daily for itchy eyes.  Use as needed. Contact your pharmacy for refills.    Refresh tears 1 drop 2-4x daily.      Heat to the eyes daily for 10-15 minutes nightly with warm washcloth or reusable gel masks from the pharmacy or  Ronald heat masks can be purchased at [employer].    Lid scrubs with baby shampoo at night.    Return in 1 year for a complete eye exam or sooner if needed.    Luis Iniguez, ANDREY    The affects of the dilating drops last for 4- 6 hours.  You will be more sensitive to light and vision will be blurry up close.  Do not drive if you do not feel comfortable.  Mydriatic sunglasses were given if needed.      Optometry Providers       Clinic Locations                                 Telephone Number   Dr. Sierra Evans/Raúl Zambrano [phone]     [facility] Hours:                [facility] Hours:       [facility] Hours:   [address]   [address] [facility]     [address] [facility] [place] [address]   [place] [address] [address]    [place] [address]  Phone: [phone]                    Phone: [phone]     Phone: [phone]                      Monday 8:00-6:00                          Monday 8:00-6:00                          Monday 8:00-6:00              Tuesday 8:00-6:00                          Tuesday 8:00-6:00                          Tuesday 8:00-6:00              Wednesday 8:00-6:00                  Wednesday 8:00-6:00                   Wednesday 8:00-6:00      Thursday 8:00-6:00                        Thursday 8:00-6:00                         Thursday 8:00-6:00            Friday 8:00-5:00                              Friday 8:00-5:00                              Friday 8:00-5:00    [facility] Hours:   [address] [facility] [place] [address] [phone]    Monday  9:00-6:00  Tuesday 9:00-6:00  Wednesday 9:00-6:00  Thursday 9:00-6:00  Friday 9:00-5:00  Please log on to [email or web address] to order your contact lenses.  The link is found on the [facility] page.  As always, Thank you for trusting us with your health care needs!

## 2022-01-31 NOTE — LETTER
1/31/2022         RE: Jo Ann Patel  [address]  [place] [address]        Dear Colleague,    Thank you for referring your patient, Jo Ann Patel, to the [facility]. Please see a copy of my visit note below.    Chief Complaint   Patient presents with     Annual Eye Exam      Accompanied by daughter  Last Eye Exam: 9-  Dilated Previously: Yes    What are you currently using to see?  glasses       Distance Vision Acuity: Satisfied with vision    Near Vision Acuity: Not satisfied     Eye Comfort: watery and itchy and red   Do you use eye drops? : Yes: patanol sometimes - sometimes washes eyelids with baby shampoo  Occupation or Hobbies: stay at home     Faye Duarte Optometric Assistant, A.B.O.C.          Medical, surgical and family histories reviewed and updated 1/31/2022.       OBJECTIVE: See Ophthalmology exam    ASSESSMENT:    ICD-10-CM    1. Examination of eyes and vision  Z01.00 EYE EXAM (SIMPLE-NONBILLABLE)   2. Regular astigmatism of right eye  H52.221 REFRACTION   3. Myopia of both eyes  H52.13 REFRACTION   4. Allergic conjunctivitis of both eyes  H10.13 EYE EXAM (SIMPLE-NONBILLABLE)     olopatadine (PATADAY) 0.2 % ophthalmic solution   5. Dry eye syndrome of both eyes  H04.123 EYE EXAM (SIMPLE-NONBILLABLE)     carboxymethylcellulose (REFRESH PLUS) 0.5 % SOLN ophthalmic solution      PLAN:     Patient Instructions   Eyeglass prescription given.     Pataday to be used once daily for itchy eyes.  Use as needed. Contact your pharmacy for refills.    Refresh tears 1 drop 2-4x daily.      Heat to the eyes daily for 10-15 minutes nightly with warm washcloth or reusable gel masks from the pharmacy or  C[employer] heat masks can be purchased at [employer].    Lid scrubs with baby shampoo at night.    Return in 1 year for a complete eye exam or sooner if needed.    Luis Iniguez, OD           Again, thank you for allowing me to participate in the care of  your patient.        Sincerely,        Luis Iniguez, OD

## 2022-02-08 ENCOUNTER — APPOINTMENT (OUTPATIENT)
Dept: OPTOMETRY | Facility: CLINIC | Age: 37
End: 2022-02-08
Payer: COMMERCIAL

## 2022-02-08 PROCEDURE — 92340 FIT SPECTACLES MONOFOCAL: CPT | Performed by: OPTOMETRIST

## 2022-02-17 PROBLEM — K21.9 GASTROESOPHAGEAL REFLUX DISEASE: Status: ACTIVE | Noted: 2019-05-01

## 2022-02-17 PROBLEM — O09.90 HIGH-RISK PREGNANCY: Status: RESOLVED | Noted: 2017-03-27 | Resolved: 2017-08-15

## 2022-03-30 ENCOUNTER — OFFICE VISIT (OUTPATIENT)
Dept: URGENT CARE | Facility: URGENT CARE | Age: 37
End: 2022-03-30
Payer: COMMERCIAL

## 2022-03-30 VITALS
OXYGEN SATURATION: 99 % | BODY MASS INDEX: 23.11 KG/M2 | TEMPERATURE: 97.5 F | DIASTOLIC BLOOD PRESSURE: 76 MMHG | HEIGHT: 62 IN | WEIGHT: 125.6 LBS | HEART RATE: 78 BPM | SYSTOLIC BLOOD PRESSURE: 116 MMHG

## 2022-03-30 DIAGNOSIS — R10.32 ABDOMINAL PAIN, BILATERAL LOWER QUADRANT: ICD-10-CM

## 2022-03-30 DIAGNOSIS — R10.2 PELVIC PAIN IN FEMALE: Primary | ICD-10-CM

## 2022-03-30 DIAGNOSIS — R10.31 ABDOMINAL PAIN, BILATERAL LOWER QUADRANT: ICD-10-CM

## 2022-03-30 LAB
ALBUMIN UR-MCNC: NEGATIVE MG/DL
APPEARANCE UR: CLEAR
BILIRUB UR QL STRIP: NEGATIVE
CLUE CELLS: ABNORMAL
COLOR UR AUTO: YELLOW
GLUCOSE UR STRIP-MCNC: NEGATIVE MG/DL
HCG UR QL: NEGATIVE
HGB UR QL STRIP: NEGATIVE
KETONES UR STRIP-MCNC: NEGATIVE MG/DL
LEUKOCYTE ESTERASE UR QL STRIP: NEGATIVE
NITRATE UR QL: NEGATIVE
PH UR STRIP: 6.5 [PH] (ref 5–7)
SP GR UR STRIP: 1.02 (ref 1–1.03)
TRICHOMONAS, WET PREP: ABNORMAL
UROBILINOGEN UR STRIP-ACNC: 0.2 E.U./DL
WBC'S/HIGH POWER FIELD, WET PREP: ABNORMAL
YEAST, WET PREP: ABNORMAL

## 2022-03-30 PROCEDURE — 99214 OFFICE O/P EST MOD 30 MIN: CPT | Performed by: PHYSICIAN ASSISTANT

## 2022-03-30 PROCEDURE — 81003 URINALYSIS AUTO W/O SCOPE: CPT | Performed by: PHYSICIAN ASSISTANT

## 2022-03-30 PROCEDURE — 87210 SMEAR WET MOUNT SALINE/INK: CPT | Performed by: PHYSICIAN ASSISTANT

## 2022-03-30 PROCEDURE — 81025 URINE PREGNANCY TEST: CPT | Performed by: PHYSICIAN ASSISTANT

## 2022-03-30 ASSESSMENT — ENCOUNTER SYMPTOMS
NAUSEA: 0
PALPITATIONS: 0
VOMITING: 0
DIARRHEA: 0
CHILLS: 0
SHORTNESS OF BREATH: 0
HEMATURIA: 0
ABDOMINAL PAIN: 1
CONSTIPATION: 0
HEARTBURN: 0
FREQUENCY: 0
WHEEZING: 0
HEMATOCHEZIA: 0
RESPIRATORY NEGATIVE: 1
COUGH: 0
DYSURIA: 0
FEVER: 0
CARDIOVASCULAR NEGATIVE: 1
CHEST TIGHTNESS: 0
FATIGUE: 1
FLANK PAIN: 0

## 2022-03-30 NOTE — PROGRESS NOTES
Vivian Cherry is a 37 year old who presents for the following health issues   HPI   Abdominal/Flank Pain  Onset/Duration: 3days  Description:   Character: cramping  Location: pelvic region.  Also noticed breast tenderness and fatigue as well.    Radiation: None  Intensity: moderate  Progression of Symptoms:  same  Accompanying Signs & Symptoms:  Fever/Chills: no  Gas/Bloating: no  Nausea: no  Vomitting: no  Diarrhea: Yes  Constipation: Yes  Dysuria or Hematuria: No dysuria, urinary frequency, urgency or hematuria.  No vaginal d/c, bleeding, rashes and irritation.  No new partners.  LMP was 3weeks ago.  History:   Trauma: no  Previous similar pain: no  Previous tests done: none  Precipitating factors:   Does the pain change with:     Food: no    Bowel Movement: no    Urination: no   Other factors:  no  Therapies tried and outcome: fluids, rest, tea with minimal relief     Patient Active Problem List   Diagnosis     H. pylori infection     Gastroesophageal reflux disease, esophagitis presence not specified     Hepatic steatosis     Non-ulcer dyspepsia     Gallbladder sludge     Itchy eyes     Allergic conjunctivitis, right     Acne rosacea     Flu vaccine need     Patient desires vaginal birth after  section ()     Current Outpatient Medications   Medication     carboxymethylcellulose (REFRESH PLUS) 0.5 % SOLN ophthalmic solution     fluticasone (FLONASE) 50 MCG/ACT nasal spray     loratadine (CLARITIN) 10 MG tablet     olopatadine (PATADAY) 0.2 % ophthalmic solution     olopatadine (PATANOL) 0.1 % ophthalmic solution     No current facility-administered medications for this visit.      No Known Allergies    Review of Systems   Constitutional: Positive for fatigue. Negative for chills and fever.   Respiratory: Negative.  Negative for cough, chest tightness, shortness of breath and wheezing.    Cardiovascular: Negative.  Negative for chest pain, palpitations and peripheral edema.  "  Gastrointestinal: Positive for abdominal pain. Negative for constipation, diarrhea, heartburn, hematochezia, nausea and vomiting.   Breasts:  Positive for tenderness.   Genitourinary: Positive for pelvic pain. Negative for dysuria, flank pain, frequency, hematuria, urgency, vaginal bleeding and vaginal discharge.   All other systems reviewed and are negative.           Objective    /76 (BP Location: Left arm, Patient Position: Sitting, Cuff Size: Adult Regular)   Pulse 78   Temp 97.5  F (36.4  C) (Tympanic)   Ht 1.575 m (5' 2\")   Wt 57 kg (125 lb 9.6 oz)   SpO2 99%   BMI 22.97 kg/m    Body mass index is 22.97 kg/m .  Physical Exam  Vitals and nursing note reviewed.   Constitutional:       General: She is not in acute distress.     Appearance: Normal appearance. She is well-developed and normal weight. She is not ill-appearing.   Cardiovascular:      Rate and Rhythm: Normal rate and regular rhythm.      Pulses: Normal pulses.      Heart sounds: Normal heart sounds, S1 normal and S2 normal. No murmur heard.    No friction rub. No gallop.   Pulmonary:      Effort: Pulmonary effort is normal. No accessory muscle usage or respiratory distress.      Breath sounds: Normal breath sounds and air entry. No decreased breath sounds, wheezing, rhonchi or rales.   Abdominal:      General: Abdomen is flat. Bowel sounds are normal.      Palpations: Abdomen is soft. There is no hepatomegaly, splenomegaly or mass.      Tenderness: There is abdominal tenderness in the right lower quadrant, suprapubic area and left lower quadrant. There is guarding and rebound. There is no right CVA tenderness or left CVA tenderness. Positive signs include Waller's sign and Rovsing's sign. Negative signs include McBurney's sign, psoas sign and obturator sign.      Hernia: No hernia is present.   Genitourinary:     Comments: Declined pelvic exam.  Skin:     General: Skin is warm and dry.   Neurological:      Mental Status: She is alert and " oriented to person, place, and time.   Psychiatric:         Mood and Affect: Mood normal.         Behavior: Behavior normal.         Thought Content: Thought content normal.         Judgment: Judgment normal.       Results for orders placed or performed in visit on 03/30/22 (from the past 24 hour(s))   UA Macro with Reflex to Micro and Culture - lab collect    Specimen: Urine, Midstream   Result Value Ref Range    Color Urine Yellow Colorless, Straw, Light Yellow, Yellow    Appearance Urine Clear Clear    Glucose Urine Negative Negative mg/dL    Bilirubin Urine Negative Negative    Ketones Urine Negative Negative mg/dL    Specific Gravity Urine 1.020 1.003 - 1.035    Blood Urine Negative Negative    pH Urine 6.5 5.0 - 7.0    Protein Albumin Urine Negative Negative mg/dL    Urobilinogen Urine 0.2 0.2, 1.0 E.U./dL    Nitrite Urine Negative Negative    Leukocyte Esterase Urine Negative Negative    Narrative    Microscopic not indicated   Wet prep - lab collect    Specimen: Vagina; Swab   Result Value Ref Range    Trichomonas Absent Absent    Yeast Absent Absent    Clue Cells Absent Absent    WBCs/high power field 2+ (A) None   HCG Qual, Urine ([id number])   Result Value Ref Range    hCG Urine Qualitative Negative Negative         Assessment/Plan:  Pelvic pain in female:  Along with abdominal pain.  H&P is concerning for ovarian cysts/torsion vs appendicitis vs diverticulitis.  Wet prep, UA and UPT were normal or negative.  Declined STD test.  Due to the severity of her pain, recommend further evaluation and management in the ER.  Will most likely need further workup with labs and/or imaging.  Patient has declined transportation via ambulance and will have family drive her/him.  Understands risks and benefits of ambulance transfer and s/he has declined.  Call 911 if worsening symptoms.  S/he plans to go to [place] ER.  S/he left in stable condition with AVS in hand.  F/u with PCP after ER visit.   -     UA Macro with  Reflex to Micro and Culture - lab collect; Future  -     Wet prep - lab collect; Future  -     UA Macro with Reflex to Micro and Culture - lab collect  -     Wet prep - lab collect  -     HCG Qual, Urine ([id number]); Future  -     HCG Qual, Urine ([id number])    Abdominal pain, bilateral lower quadrant        Skyla Milton Damico PA-C

## 2022-09-11 ENCOUNTER — HEALTH MAINTENANCE LETTER (OUTPATIENT)
Age: 37
End: 2022-09-11

## 2022-12-06 ENCOUNTER — TELEPHONE (OUTPATIENT)
Dept: FAMILY MEDICINE | Facility: CLINIC | Age: 37
End: 2022-12-06

## 2022-12-06 NOTE — TELEPHONE ENCOUNTER
Patient calling to schedule a ER follow up. Patient was seen at [facility] on 12/5/2022.  Mera Vu [facility]  2nd Floor  Primary Care

## 2022-12-06 NOTE — TELEPHONE ENCOUNTER
Please help patient schedule for follow-up on ER visit as requested  Any available new patient/same-day slots are fine

## 2022-12-12 ENCOUNTER — OFFICE VISIT (OUTPATIENT)
Dept: FAMILY MEDICINE | Facility: CLINIC | Age: 37
End: 2022-12-12
Payer: COMMERCIAL

## 2022-12-12 VITALS
WEIGHT: 139.8 LBS | SYSTOLIC BLOOD PRESSURE: 107 MMHG | BODY MASS INDEX: 25.73 KG/M2 | DIASTOLIC BLOOD PRESSURE: 70 MMHG | HEIGHT: 62 IN | HEART RATE: 82 BPM | TEMPERATURE: 98.7 F | OXYGEN SATURATION: 100 % | RESPIRATION RATE: 14 BRPM

## 2022-12-12 DIAGNOSIS — N39.41 URGE INCONTINENCE OF URINE: ICD-10-CM

## 2022-12-12 DIAGNOSIS — R20.0 NUMBNESS OF TONGUE: ICD-10-CM

## 2022-12-12 DIAGNOSIS — R63.5 WEIGHT GAIN: ICD-10-CM

## 2022-12-12 DIAGNOSIS — K59.01 SLOW TRANSIT CONSTIPATION: ICD-10-CM

## 2022-12-12 DIAGNOSIS — N83.201 RIGHT OVARIAN CYST: ICD-10-CM

## 2022-12-12 DIAGNOSIS — R10.32 LLQ ABDOMINAL PAIN: Primary | ICD-10-CM

## 2022-12-12 LAB
ALBUMIN SERPL-MCNC: 3.7 G/DL (ref 3.4–5)
ALP SERPL-CCNC: 62 U/L (ref 40–150)
ALT SERPL W P-5'-P-CCNC: 47 U/L (ref 0–50)
ANION GAP SERPL CALCULATED.3IONS-SCNC: 5 MMOL/L (ref 3–14)
AST SERPL W P-5'-P-CCNC: 22 U/L (ref 0–45)
BILIRUB SERPL-MCNC: 0.4 MG/DL (ref 0.2–1.3)
BUN SERPL-MCNC: 11 MG/DL (ref 7–30)
CALCIUM SERPL-MCNC: 9 MG/DL (ref 8.5–10.1)
CHLORIDE BLD-SCNC: 107 MMOL/L (ref 94–109)
CO2 SERPL-SCNC: 26 MMOL/L (ref 20–32)
CREAT SERPL-MCNC: 0.62 MG/DL (ref 0.52–1.04)
ERYTHROCYTE [DISTWIDTH] IN BLOOD BY AUTOMATED COUNT: 11.4 % (ref 10–15)
GFR SERPL CREATININE-BSD FRML MDRD: >90 ML/MIN/1.73M2
GLUCOSE BLD-MCNC: 100 MG/DL (ref 70–99)
HCT VFR BLD AUTO: 37.4 % (ref 35–47)
HGB BLD-MCNC: 12.7 G/DL (ref 11.7–15.7)
MCH RBC QN AUTO: 31.2 PG (ref 26.5–33)
MCHC RBC AUTO-ENTMCNC: 34 G/DL (ref 31.5–36.5)
MCV RBC AUTO: 92 FL (ref 78–100)
PLATELET # BLD AUTO: 361 10E3/UL (ref 150–450)
POTASSIUM BLD-SCNC: 3.5 MMOL/L (ref 3.4–5.3)
PROT SERPL-MCNC: 7.8 G/DL (ref 6.8–8.8)
RBC # BLD AUTO: 4.07 10E6/UL (ref 3.8–5.2)
SODIUM SERPL-SCNC: 138 MMOL/L (ref 133–144)
TSH SERPL DL<=0.005 MIU/L-ACNC: 3.32 MU/L (ref 0.4–4)
VIT B12 SERPL-MCNC: 539 PG/ML (ref 232–1245)
WBC # BLD AUTO: 7.6 10E3/UL (ref 4–11)

## 2022-12-12 PROCEDURE — 99214 OFFICE O/P EST MOD 30 MIN: CPT | Performed by: NURSE PRACTITIONER

## 2022-12-12 PROCEDURE — 36415 COLL VENOUS BLD VENIPUNCTURE: CPT | Performed by: NURSE PRACTITIONER

## 2022-12-12 PROCEDURE — 84443 ASSAY THYROID STIM HORMONE: CPT | Performed by: NURSE PRACTITIONER

## 2022-12-12 PROCEDURE — 80053 COMPREHEN METABOLIC PANEL: CPT | Performed by: NURSE PRACTITIONER

## 2022-12-12 PROCEDURE — 82607 VITAMIN B-12: CPT | Performed by: NURSE PRACTITIONER

## 2022-12-12 PROCEDURE — 85027 COMPLETE CBC AUTOMATED: CPT | Performed by: NURSE PRACTITIONER

## 2022-12-12 PROCEDURE — 82306 VITAMIN D 25 HYDROXY: CPT | Performed by: NURSE PRACTITIONER

## 2022-12-12 RX ORDER — IBUPROFEN 600 MG/1
600 TABLET, FILM COATED ORAL EVERY 6 HOURS PRN
Qty: 60 TABLET | Refills: 1 | Status: SHIPPED | OUTPATIENT
Start: 2022-12-12 | End: 2023-09-26

## 2022-12-12 ASSESSMENT — PAIN SCALES - GENERAL: PAINLEVEL: NO PAIN (0)

## 2022-12-12 NOTE — PROGRESS NOTES
"  Assessment & Plan     LLQ abdominal pain  Has occurred before recent ER visit, but this time it was much more painful. When she tries to workout (before the ER visit) she would have LLQ pain which limits her activity. ER noted to have Epiploic appendages which should resolve on their own with anti-inflammatories and/or pain medication. She states the norco was too strong. She will stop that.  Gave ibuprofen, will take for 6 days 2-3 times a day with food. If not improving may need to refer for surgical removal?    - ibuprofen (ADVIL/MOTRIN) 600 MG tablet; Take 1 tablet (600 mg) by mouth every 6 hours as needed for moderate pain (4-6) Take with food    Urge incontinence of urine  On-going since her last baby 2 years ago. Sent Kegel exercises via [employer] in [place]    Weight gain  She is having trouble working out due to pain above, but also may not be eating enough. She is working with a  too. Will check labs  - Comprehensive metabolic panel (BMP + Alb, Alk Phos, ALT, AST, Total. Bili, TP); Future  - TSH with free T4 reflex; Future  - Comprehensive metabolic panel (BMP + Alb, Alk Phos, ALT, AST, Total. Bili, TP)  - TSH with free T4 reflex    Right ovarian cyst  Noted on CT scan in ER, monitor for now    Numbness of tongue  On-going for a while. Check labs. Sometimes painful, sometimes only 1 spot on tongue, ,sometimes multiple patches on her tongue are uncomfortable   - Vitamin B12; Future  - Vitamin D Deficiency; Future  - CBC with platelets; Future  - Vitamin B12  - Vitamin D Deficiency  - CBC with platelets    Constipation   Work on moving bowels more also-see AVS       MED REC REQUIRED  Post Medication Reconciliation Status: discharge medications reconciled and changed, per note/orders  BMI:   Estimated body mass index is 25.57 kg/m  as calculated from the following:    Height as of this encounter: 1.575 m (5' 2\").    Weight as of this encounter: 63.4 kg (139 lb 12.8 oz).   Weight management plan: " Discussed healthy diet and exercise guidelines      Return in about 1 week (around 12/19/2022), or if symptoms worsen or fail to improve. follow up is pending test results and if ibuprofen is helping her LLQ pain    TREVA Sarmiento NP-C  [facility] FLORECITA Cherry is a 37 year old accompanied by her , presenting for the following health issues:  ER F/U      HPI     ED/UC Followup:    Facility:  [facility] ER  Date of visit: 12/5/22  Reason for visit: LT lower quadrant abdominal pain   Current Status: The pain has subsided      Pain is better. Originally she did feel like things were more swollen on the left side also. Bowels are moving but tiny balls coming out. Sometimes is constipated. When this occurs drinks more water, papya, eat pineapple. And tea.  Had this pain before but wasn't as bad as when she went in recently.     Also feels she weights more and isn't eating much. Was 125 lb in March now 139 lb.  She is trying to exercise, but then was getting the LLQ pain. Prior to going to the ER, she was getting some LLQ pain with exercise. No thyroid disease in family. Nutrition includes: breakfast: 3 eggs and slice of bread, lunch rice/meat veggies. She works at night, and doesn't eat dinner or anything after that. Sometimes will eat at work at 3 am but not always. Talked about might need more calories with  as she may be going into starvation mood with her body if she isn't eating enough. Is working with a  to help her with eating but doesn't always get to the goal of calories she should be eating. She eats less than what she is supposed to for the tate.     When she coughs, sneezes sometimes urinates a little. Has 3 girls, youngest one is 2 yr/old     Right ovarian cyst: notes some change in her menses color, it is more darker. Cycle is still regular.       Review of Systems   Constitutional, HEENT, cardiovascular, pulmonary, gi-as above and gu  "systems are negative, except as otherwise noted.      Objective    /70 (BP Location: Right arm, Patient Position: Sitting, Cuff Size: Adult Regular)   Pulse 82   Temp 98.7  F (37.1  C) (Oral)   Resp 14   Ht 1.575 m (5' 2\")   Wt 63.4 kg (139 lb 12.8 oz)   LMP 11/23/2022 (Approximate)   SpO2 100%   BMI 25.57 kg/m    Body mass index is 25.57 kg/m .  Physical Exam   GENERAL: healthy, alert and no distress  EYES: Eyes grossly normal to inspection, PERRL and conjunctivae and sclerae normal  HENT: ear canals and TM's normal, nose and mouth without ulcers or lesions  NECK: no adenopathy, no asymmetry, masses, or scars and thyroid normal to palpation  RESP: lungs clear to auscultation - no rales, rhonchi or wheezes  CV: regular rate and rhythm, normal S1 S2, no S3 or S4, no murmur, click or rub  ABDOMEN: soft, slightly tender lower pelvic (right to left), no hepatosplenomegaly, no masses and bowel sounds hypoactive    No results found for this or any previous visit (from the past 24 hour(s)).            "

## 2022-12-12 NOTE — PATIENT INSTRUCTIONS
Ibuprofen 600 mg 2-3 times a day for the next 6 days, then as needed. Take with food  Update provider in 1-2 weeks when you are able to try and exercise and let me know if the pain is coming back    Use kegel exercises for the urinary incontinent      For constipation:   Use miralax 1 capful daily  Drink at least 8-10 glasses water daily  Exercise when you can  Increase fiber in your diet-if you cannot food wise then use metamucil daily

## 2022-12-13 LAB — DEPRECATED CALCIDIOL+CALCIFEROL SERPL-MC: 26 UG/L (ref 20–75)

## 2022-12-13 NOTE — RESULT ENCOUNTER NOTE
Hi Jo Ann,   So far labs are looking normal. Glucose was a little elevated but we can monitor. The B-12 did not give me a reason for the tongue issue. Please let me know if it does not improve.   Thank you,  TREVA Sarmiento, NP-C  [facility]

## 2022-12-13 NOTE — RESULT ENCOUNTER NOTE
Hi Jo Ann,   Your Vit D is normal, but you should be taking at least 2000 units daily. Please let me know if you have questions.  Thank you,  TREVA Sarmiento, NP-C  [facility]

## 2023-01-23 ENCOUNTER — HEALTH MAINTENANCE LETTER (OUTPATIENT)
Age: 38
End: 2023-01-23

## 2023-04-10 ENCOUNTER — OFFICE VISIT (OUTPATIENT)
Dept: FAMILY MEDICINE | Facility: CLINIC | Age: 38
End: 2023-04-10
Payer: COMMERCIAL

## 2023-04-10 VITALS
RESPIRATION RATE: 16 BRPM | BODY MASS INDEX: 25.58 KG/M2 | HEIGHT: 62 IN | HEART RATE: 86 BPM | WEIGHT: 139 LBS | OXYGEN SATURATION: 100 % | SYSTOLIC BLOOD PRESSURE: 108 MMHG | DIASTOLIC BLOOD PRESSURE: 73 MMHG | TEMPERATURE: 97.6 F

## 2023-04-10 DIAGNOSIS — N39.3 FEMALE STRESS INCONTINENCE: Primary | ICD-10-CM

## 2023-04-10 DIAGNOSIS — R10.2 PELVIC CRAMPING: ICD-10-CM

## 2023-04-10 DIAGNOSIS — N92.6 IRREGULAR MENSES: ICD-10-CM

## 2023-04-10 LAB
ALBUMIN UR-MCNC: NEGATIVE MG/DL
AMORPH CRY #/AREA URNS HPF: ABNORMAL /HPF
APPEARANCE UR: ABNORMAL
BILIRUB UR QL STRIP: NEGATIVE
CLUE CELLS: ABNORMAL
COLOR UR AUTO: YELLOW
GLUCOSE UR STRIP-MCNC: NEGATIVE MG/DL
HCG UR QL: NEGATIVE
HGB UR QL STRIP: NEGATIVE
KETONES UR STRIP-MCNC: NEGATIVE MG/DL
LEUKOCYTE ESTERASE UR QL STRIP: NEGATIVE
NITRATE UR QL: NEGATIVE
PH UR STRIP: 7 [PH] (ref 5–7)
RBC #/AREA URNS AUTO: ABNORMAL /HPF
SP GR UR STRIP: 1.02 (ref 1–1.03)
SQUAMOUS #/AREA URNS AUTO: ABNORMAL /LPF
TRICHOMONAS, WET PREP: ABNORMAL
UROBILINOGEN UR STRIP-ACNC: 0.2 E.U./DL
WBC #/AREA URNS AUTO: ABNORMAL /HPF
WBC'S/HIGH POWER FIELD, WET PREP: ABNORMAL
YEAST, WET PREP: ABNORMAL

## 2023-04-10 PROCEDURE — 81025 URINE PREGNANCY TEST: CPT | Performed by: PHYSICIAN ASSISTANT

## 2023-04-10 PROCEDURE — 81001 URINALYSIS AUTO W/SCOPE: CPT | Performed by: PHYSICIAN ASSISTANT

## 2023-04-10 PROCEDURE — 87210 SMEAR WET MOUNT SALINE/INK: CPT | Performed by: PHYSICIAN ASSISTANT

## 2023-04-10 PROCEDURE — 99214 OFFICE O/P EST MOD 30 MIN: CPT | Performed by: PHYSICIAN ASSISTANT

## 2023-04-10 PROCEDURE — 87086 URINE CULTURE/COLONY COUNT: CPT | Performed by: PHYSICIAN ASSISTANT

## 2023-04-10 ASSESSMENT — ENCOUNTER SYMPTOMS: ABDOMINAL PAIN: 1

## 2023-04-10 ASSESSMENT — PAIN SCALES - GENERAL: PAINLEVEL: NO PAIN (0)

## 2023-04-10 NOTE — PROGRESS NOTES
Answers for HPI/ROS submitted by the patient on 4/10/2023  What is the reason for your visit today? : i did not feel good  How many servings of fruits and vegetables do you eat daily?: 2-3  On average, how many sweetened beverages do you drink each day (Examples: soda, juice, sweet tea, etc.  Do NOT count diet or artificially sweetened beverages)?: 1  How many minutes a day do you exercise enough to make your heart beat faster?: 9 or less  How many days a week do you exercise enough to make your heart beat faster?: 3 or less  How many days per week do you miss taking your medication?: 0

## 2023-04-10 NOTE — PROGRESS NOTES
Assessment & Plan     Female stress incontinence  She has discussed this in the past with her primary provider. Kegel exercises were reviewed.   Referral placed for pelvic floor therapy.   - Physical Therapy Referral; Future    Pelvic cramping  All tests negative / normal   - UA Macro with Reflex to Micro and Culture - lab collect; Future  - UA Macro with Reflex to Micro and Culture - lab collect  - UA Microscopic with Reflex to Culture  - Urine Culture Aerobic Bacterial - lab collect; Future  - Urine Culture Aerobic Bacterial - lab collect  - Wet prep - lab collect; Future  - HCG qualitative urine; Future  - Wet prep - lab collect    Irregular menses  - HCG qualitative urine; Future  - HCG qualitative urine                 Kristen M. Kehr, PA-C  [facility]    Subjective   Jo Ann is a 38 year old, presenting for the following health issues:  Urinary Problem and Abdominal Pain  Interpretor used.   Jo Ann is here today for urinary stress incontinence. She has some pelvic cramping also. This has been a recurrent concern for her.   She had her menses x 2 this past month.   No vaginal discomfort or excessive discharge.   No pain with passing urine or blood in urine.   No flank pain associated.           4/10/2023     1:01 PM   Additional Questions   Roomed by valdo         4/10/2023     1:01 PM   Patient Reported Additional Medications   Patient reports taking the following new medications none     Abdominal Pain     History of Present Illness       Reason for visit:  I did not feel good    She eats 2-3 servings of fruits and vegetables daily.She consumes 1 sweetened beverage(s) daily.She exercises with enough effort to increase her heart rate 9 or less minutes per day.  She exercises with enough effort to increase her heart rate 3 or less days per week.   She is taking medications regularly.       Patient complains of on and off abdominal pain with urination problems for a few weeks  "now.  Deedee Soto, [facility]          Review of Systems   Gastrointestinal: Positive for abdominal pain.      Constitutional, HEENT, cardiovascular, pulmonary, GI, , musculoskeletal, neuro, skin, endocrine and psych systems are negative, except as otherwise noted.      Objective    /73   Pulse 86   Temp 97.6  F (36.4  C) (Tympanic)   Resp 16   Ht 1.575 m (5' 2\")   Wt 63 kg (139 lb)   SpO2 100%   BMI 25.42 kg/m    Body mass index is 25.42 kg/m .  Physical Exam   GENERAL: healthy, alert and no distress  ABDOMEN: soft, nontender, no hepatosplenomegaly, no masses and bowel sounds normal  MS: no gross musculoskeletal defects noted, no edema  SKIN: no suspicious lesions or rashes  BACK: no CVA tenderness, no paralumbar tenderness  PSYCH: mentation appears normal, affect normal/bright    Results for orders placed or performed in visit on 04/10/23 (from the past 24 hour(s))   UA Macro with Reflex to Micro and Culture - lab collect    Specimen: Urine, Clean Catch   Result Value Ref Range    Color Urine Yellow Colorless, Straw, Light Yellow, Yellow    Appearance Urine Slightly Cloudy (A) Clear    Glucose Urine Negative Negative mg/dL    Bilirubin Urine Negative Negative    Ketones Urine Negative Negative mg/dL    Specific Gravity Urine 1.020 1.003 - 1.035    Blood Urine Negative Negative    pH Urine 7.0 5.0 - 7.0    Protein Albumin Urine Negative Negative mg/dL    Urobilinogen Urine 0.2 0.2, 1.0 E.U./dL    Nitrite Urine Negative Negative    Leukocyte Esterase Urine Negative Negative   UA Microscopic with Reflex to Culture   Result Value Ref Range    RBC Urine 0-2 0-2 /HPF /HPF    WBC Urine 0-5 0-5 /HPF /HPF    Squamous Epithelials Urine Moderate (A) None Seen /LPF    Amorphous Crystals Urine Many (A) None Seen /HPF    Narrative    Urine Culture not indicated   HCG qualitative urine   Result Value Ref Range    hCG Urine Qualitative Negative Negative   Wet prep - lab collect    Specimen: Vagina; Swab   Result Value " Ref Range    Trichomonas Absent Absent    Yeast Absent Absent    Clue Cells Absent Absent    WBCs/high power field 3+ (A) None

## 2023-04-12 LAB — BACTERIA UR CULT: NORMAL

## 2023-06-03 ENCOUNTER — ANCILLARY PROCEDURE (OUTPATIENT)
Dept: GENERAL RADIOLOGY | Facility: CLINIC | Age: 38
End: 2023-06-03
Attending: FAMILY MEDICINE
Payer: COMMERCIAL

## 2023-06-03 ENCOUNTER — OFFICE VISIT (OUTPATIENT)
Dept: URGENT CARE | Facility: URGENT CARE | Age: 38
End: 2023-06-03
Payer: COMMERCIAL

## 2023-06-03 VITALS
SYSTOLIC BLOOD PRESSURE: 105 MMHG | BODY MASS INDEX: 25.47 KG/M2 | WEIGHT: 139.25 LBS | TEMPERATURE: 98.7 F | HEART RATE: 80 BPM | OXYGEN SATURATION: 98 % | DIASTOLIC BLOOD PRESSURE: 67 MMHG | RESPIRATION RATE: 22 BRPM

## 2023-06-03 DIAGNOSIS — M25.512 ACUTE PAIN OF LEFT SHOULDER: ICD-10-CM

## 2023-06-03 DIAGNOSIS — S46.912A STRAIN OF LEFT SHOULDER, INITIAL ENCOUNTER: Primary | ICD-10-CM

## 2023-06-03 PROCEDURE — 73030 X-RAY EXAM OF SHOULDER: CPT | Mod: TC | Performed by: RADIOLOGY

## 2023-06-03 PROCEDURE — 99214 OFFICE O/P EST MOD 30 MIN: CPT | Performed by: FAMILY MEDICINE

## 2023-06-03 RX ORDER — CYCLOBENZAPRINE HCL 5 MG
5 TABLET ORAL
Qty: 30 TABLET | Refills: 0 | Status: SHIPPED | OUTPATIENT
Start: 2023-06-03 | End: 2023-09-26

## 2023-06-03 RX ORDER — DICLOFENAC SODIUM 75 MG/1
75 TABLET, DELAYED RELEASE ORAL 2 TIMES DAILY PRN
Qty: 40 TABLET | Refills: 1 | Status: SHIPPED | OUTPATIENT
Start: 2023-06-03 | End: 2023-07-31

## 2023-06-03 RX ORDER — METHYLPREDNISOLONE 4 MG
TABLET, DOSE PACK ORAL
Qty: 21 TABLET | Refills: 0 | Status: SHIPPED | OUTPATIENT
Start: 2023-06-03 | End: 2023-09-26

## 2023-06-03 NOTE — PROGRESS NOTES
"  Assessment & Plan     Strain of left shoulder, initial encounter  Reviewed x-ray with patient, no acute finding.  Discussed with patient likely an AC ligament strain.  Advised with home exercise, stretching, strengthening exercises.  RICE therapy  - methylPREDNISolone (MEDROL DOSEPAK) 4 MG tablet therapy pack; Follow Package Directions  - cyclobenzaprine (FLEXERIL) 5 MG tablet; Take 1 tablet (5 mg) by mouth nightly as needed for muscle spasms  - diclofenac (VOLTAREN) 75 MG EC tablet; Take 1 tablet (75 mg) by mouth 2 times daily as needed for moderate pain    Acute pain of left shoulder  As above.  - XR Shoulder Left G/E 3 Views; Future       BMI:   Estimated body mass index is 25.47 kg/m  as calculated from the following:    Height as of 4/10/23: 1.575 m (5' 2\").    Weight as of this encounter: 63.2 kg (139 lb 4 oz).   Weight management plan: Discussed healthy diet and exercise guidelines    Work on weight loss  Regular exercise    Return in about 1 week (around 6/10/2023) for Follow up.    Wyatt Castillo MD  [facility]    Vivian Cherry is a 38 year old, presenting for the following health issues:  Urgent Care (Present for left upper arm bone pain radiating down into fingers for one month - reports arm hurts with movement. /(Denied chest pain, sob, redness, swelling).)  Patient is a left-hand-dominant.  She reports a month ago she was moving objects, it was not heavy.  Then she started having stiffness in her shoulder.  Now she is having more pain and stiffness in the shoulder, sometimes she feels numbness and burning sensation in her fingers.  Pain when she moves her shoulder certain ways.    She has no neck pain.  She has no injury, no trauma, no falls.        4/10/2023     1:01 PM   Additional Questions   Roomed by valdo       Review of Systems   Constitutional, HEENT, cardiovascular, pulmonary, GI, , musculoskeletal, neuro, skin, endocrine and psych systems are " negative, except as otherwise noted.      Objective    /67 (BP Location: Right arm, Patient Position: Sitting, Cuff Size: Adult Regular)   Pulse 80   Temp 98.7  F (37.1  C) (Tympanic)   Resp 22   Wt 63.2 kg (139 lb 4 oz)   SpO2 98%   BMI 25.47 kg/m    Body mass index is 25.47 kg/m .  Physical Exam   GENERAL: healthy, alert and no distress  NECK: no adenopathy, no asymmetry, masses, or scars and thyroid normal to palpation  MS: extremities normal- no gross deformities noted  Left shoulder exam:  No sign of injury, no effusion.  Decreased range of motion, pain at the anterior aspect with deep palpitation.  Pain with flexion and extension, however strength is intact.  Left elbow pain normal full flexion extension, no effusion no tenderness  SKIN: no suspicious lesions or rashes  NEURO: Normal strength and tone, mentation intact and speech normal  BACK: no CVA tenderness, no paralumbar tenderness    Orders Placed This Encounter   Procedures     XR Shoulder Left G/E 3 Views         Wyatt Castillo MD

## 2023-07-03 ENCOUNTER — OFFICE VISIT (OUTPATIENT)
Dept: URGENT CARE | Facility: URGENT CARE | Age: 38
End: 2023-07-03
Payer: COMMERCIAL

## 2023-07-03 VITALS
OXYGEN SATURATION: 100 % | DIASTOLIC BLOOD PRESSURE: 64 MMHG | HEART RATE: 86 BPM | SYSTOLIC BLOOD PRESSURE: 116 MMHG | WEIGHT: 137 LBS | BODY MASS INDEX: 25.06 KG/M2 | TEMPERATURE: 98.9 F

## 2023-07-03 DIAGNOSIS — J06.9 UPPER RESPIRATORY TRACT INFECTION, UNSPECIFIED TYPE: ICD-10-CM

## 2023-07-03 DIAGNOSIS — J02.0 STREPTOCOCCAL PHARYNGITIS: Primary | ICD-10-CM

## 2023-07-03 LAB — DEPRECATED S PYO AG THROAT QL EIA: POSITIVE

## 2023-07-03 PROCEDURE — 99213 OFFICE O/P EST LOW 20 MIN: CPT | Performed by: STUDENT IN AN ORGANIZED HEALTH CARE EDUCATION/TRAINING PROGRAM

## 2023-07-03 PROCEDURE — 87880 STREP A ASSAY W/OPTIC: CPT | Performed by: STUDENT IN AN ORGANIZED HEALTH CARE EDUCATION/TRAINING PROGRAM

## 2023-07-03 RX ORDER — ONDANSETRON 4 MG/1
1 TABLET, ORALLY DISINTEGRATING ORAL DAILY PRN
COMMUNITY
Start: 2022-12-06 | End: 2023-09-26

## 2023-07-03 RX ORDER — AMOXICILLIN 500 MG/1
500 CAPSULE ORAL 2 TIMES DAILY
Qty: 20 CAPSULE | Refills: 0 | Status: SHIPPED | OUTPATIENT
Start: 2023-07-03 | End: 2023-07-13

## 2023-07-03 NOTE — PROGRESS NOTES
Assessment & Plan     Streptococcal pharyngitis  Patient was diagnosed with strep throat in clinic today. We'll treat with amoxicillin as below. Return precautions given as per patient instructions.  - amoxicillin (AMOXIL) 500 MG capsule  Dispense: 20 capsule; Refill: 0    Arsalan Fowler MD  [facility]    Vivian Cherry is a 38 year old, presenting for the following health issues:  Pharyngitis (Fever, and pain in both ears. Sx about a week. )        4/10/2023     1:01 PM   Additional Questions   Roomed by valdo LUIS     Patient reports that she has been having sore throat, headaches for approximately the last week. Last week she had a fever for two days 102 temperature max. She took two amoxicillin and symptoms improved buts its back now. Denies any cough, shortness of breath, nausea/vomiting. Denies any diarrhea.    Review of Systems   Constitutional, HEENT, cardiovascular, pulmonary, gi and gu systems are negative, except as otherwise noted.      Objective    /64 (BP Location: Right arm, Cuff Size: Adult Regular)   Pulse 86   Temp 98.9  F (37.2  C) (Tympanic)   Wt 62.1 kg (137 lb)   SpO2 100%   BMI 25.06 kg/m    Body mass index is 25.06 kg/m .  Physical Exam   GENERAL: healthy, alert and no distress  HEENT: TM within normal limits bilaterally. Posterior oropharynx with erythema, no exudative tonsils  NECK: no adenopathy, no asymmetry, masses, or scars and thyroid normal to palpation  RESP: lungs clear to auscultation - no rales, rhonchi or wheezes  CV: regular rate and rhythm, normal S1 S2, no S3 or S4, no murmur, click or rub, no peripheral edema and peripheral pulses strong  ABDOMEN: soft, nontender, no hepatosplenomegaly, no masses and bowel sounds normal  MS: no gross musculoskeletal defects noted, no edema    Results for orders placed or performed in visit on 07/03/23 (from the past 24 hour(s))   Streptococcus A Rapid Screen w/Reflex to PCR - Clinic  Collect    Specimen: Throat; Swab   Result Value Ref Range    Group A Strep antigen Positive (A) Negative

## 2023-07-03 NOTE — LETTER
July 3, 2023      Jo Ann Patel  [id number] [address]        To Whom It May Concern:    Jo Ann Patel was seen in our clinic. She may return to work 7/5 without restrictions.      Sincerely,        Arsalan Fowler MD

## 2023-07-31 ENCOUNTER — VIRTUAL VISIT (OUTPATIENT)
Dept: FAMILY MEDICINE | Facility: CLINIC | Age: 38
End: 2023-07-31
Payer: COMMERCIAL

## 2023-07-31 DIAGNOSIS — M25.512 ACUTE PAIN OF LEFT SHOULDER: ICD-10-CM

## 2023-07-31 DIAGNOSIS — S46.912A STRAIN OF LEFT SHOULDER, INITIAL ENCOUNTER: Primary | ICD-10-CM

## 2023-07-31 PROCEDURE — 99213 OFFICE O/P EST LOW 20 MIN: CPT | Mod: VID | Performed by: FAMILY MEDICINE

## 2023-07-31 RX ORDER — DICLOFENAC SODIUM 75 MG/1
75 TABLET, DELAYED RELEASE ORAL 2 TIMES DAILY PRN
Qty: 60 TABLET | Refills: 0 | Status: SHIPPED | OUTPATIENT
Start: 2023-07-31 | End: 2023-09-09

## 2023-07-31 NOTE — PROGRESS NOTES
Jo Ann is a 38 year old who is being evaluated via a billable video visit.      How would you like to obtain your AVS? MyChart  If the video visit is dropped, the invitation should be resent by: Text to cell phone: [phone]  Will anyone else be joining your video visit? No          Assessment & Plan     Strain of left shoulder, initial encounter  Reviewed normal left shoulder x-ray from June 2023  Given the ongoing pain, recommended to start on physical therapy, apply local ice and heat, avoid triggering activities, use Tylenol 650 mg 3 times daily as needed for mild pain, take diclofenac 75 mg twice a day as needed for moderate to severe pain  If no improvement noted in 4 weeks of PT, patient will call to schedule for orthopedic consult for further evaluation and management  Patient verbalised understanding and is agreeable to the plan.    - Physical Therapy Referral; Future  - Orthopedic  Referral; Future  - diclofenac (VOLTAREN) 75 MG EC tablet; Take 1 tablet (75 mg) by mouth 2 times daily as needed for moderate pain    Acute pain of left shoulder  as above    - Physical Therapy Referral; Future  - Orthopedic  Referral; Future  - diclofenac (VOLTAREN) 75 MG EC tablet; Take 1 tablet (75 mg) by mouth 2 times daily as needed for moderate pain    Review of the result(s) of each unique test - left shoulder x-ray         Chart documentation done in part with Dragon Voice recognition Software. Although reviewed after completion, some word and grammatical error may remain.    See Patient Instructions    Robson Wilson MD  [facility]   Jo Ann is a 38 year old, presenting for the following health issues:  Patient is here for a video visit instead of in person visit due to the current COVID-19 pandemic.  Patient is here for video visit with concerns of having ongoing pain in the outer aspect of the left shoulder for the past couple months, that started  after she strained her shoulder during vacation  Patient is left-handed  She was seen at the urgent care and had an x-ray on 6/3/2023 that was negative  Patient was given prescription for Medrol Dosepak muscle relaxants and diclofenac,  She did not notice much improvement in the symptoms or solution yet, is requesting refill of baclofen to use as needed for severe pain  Patient denies history of fall, direct trauma to the left shoulder  Denies neck pain, right-sided symptoms  Recheck Medication and Shoulder Pain        7/31/2023     8:11 AM   Additional Questions   Roomed by Brenna LUIS     Concern - Shoulder Pain (Left)   Onset:   Description: Pain is always there, Nerve pain   Intensity: moderate, severe  Progression of Symptoms:  same  Accompanying Signs & Symptoms:   Previous history of similar problem: Sometimes back pain   Precipitating factors:        Worsened by: Lifting Arm   Alleviating factors:        Improved by: Medication   Therapies tried and outcome:         Review of Systems   CONSTITUTIONAL: NEGATIVE for fever, chills, change in weight  MUSCULOSKELETAL:     NEURO: NEGATIVE for weakness, dizziness or paresthesias  PSYCHIATRIC: NEGATIVE for changes in mood or affect      Objective    Vitals - Patient Reported  Pain Score: No Pain (0)      Vitals:  No vitals were obtained today due to virtual visit.    Physical Exam   GENERAL: Healthy, alert and no distress  EYES: Eyes grossly normal to inspection  RESP: No audible wheeze, cough, or visible cyanosis.  No visible retractions or increased work of breathing.    NEURO: Cranial nerves grossly intact.  Mentation and speech appropriate for age.  PSYCH: Mentation appears normal, affect normal/bright, judgement and insight intact, normal speech and appearance well-groomed.                Video-Visit Details    Type of service:  Video Visit   Video Start Time:  8:55 AM  Video End Time: 9:02 AM    Originating Location (pt. Location): Home    Distant Location  (provider location):  Off-site  Platform used for Video Visit: Miguel Ángel

## 2023-08-03 ENCOUNTER — THERAPY VISIT (OUTPATIENT)
Dept: PHYSICAL THERAPY | Facility: CLINIC | Age: 38
End: 2023-08-03
Attending: FAMILY MEDICINE
Payer: COMMERCIAL

## 2023-08-03 DIAGNOSIS — M25.512 ACUTE PAIN OF LEFT SHOULDER: ICD-10-CM

## 2023-08-03 DIAGNOSIS — S46.912A STRAIN OF LEFT SHOULDER, INITIAL ENCOUNTER: ICD-10-CM

## 2023-08-03 PROCEDURE — 97161 PT EVAL LOW COMPLEX 20 MIN: CPT | Mod: GP | Performed by: PHYSICAL THERAPIST

## 2023-08-03 PROCEDURE — 97110 THERAPEUTIC EXERCISES: CPT | Mod: GP | Performed by: PHYSICAL THERAPIST

## 2023-08-03 PROCEDURE — 97035 APP MDLTY 1+ULTRASOUND EA 15: CPT | Mod: GP | Performed by: PHYSICAL THERAPIST

## 2023-08-03 NOTE — PROGRESS NOTES
PHYSICAL THERAPY EVALUATION  Type of Visit: Evaluation    See electronic medical record for Abuse and Falls Screening details.    Subjective       Presenting condition or subjective complaint:    Date of onset: 23    Relevant medical history:     Dates & types of surgery:      Prior diagnostic imaging/testing results:       Prior therapy history for the same diagnosis, illness or injury:        Prior Level of Function  Transfers: Independent  Ambulation: Independent  ADL: Independent  IADL:     Living Environment  Social support:     Type of home:     Stairs to enter the home:         Ramp:     Stairs inside the home:         Help at home:    Equipment owned:       Employment:      Hobbies/Interests:      Patient goals for therapy:      Pain assessment: Location: L anterior shoudler with referred sx's to the L hand , constant /Ratin/10      Objective   SHOULDER EVALUATION  PAIN: Pain is Exacerbated By: reaching, lifting greater than a couple pounds, sleeping at night and caring for her daughter   Pain is Relieved By: cold, NSAIDs, rest, and position avoidance   INTEGUMENTARY (edema, incisions): WNL  POSTURE:  forward head, rounded shoudlers   GAIT:   Weightbearing Status: WBAT  Assistive Device(s): None  Gait Deviations: WNL  BALANCE/PROPRIOCEPTION:   WEIGHTBEARING ALIGNMENT:   ROM:   (Degrees) Left AROM Left PROM Right AROM  Right PROM   Shoulder Flexion 90  170    Shoulder Extension       Shoulder Abduction 20  160    Shoulder Adduction       Shoulder Internal Rotation Back pocket   T7    Shoulder External Rotation 0  80    Shoulder Horizontal Abduction       Shoulder Horizontal Adduction       Shoulder Flexion ER       Shoulder Flexion IR       Elbow Extension       Elbow Flexion       Pain:   End feel:     STRENGTH:  not tested, very acute and non tolerant to much movement   FLEXIBILITY:   SPECIAL TESTS:    Left Right   Impingement     Neer's Positive    Hawkin's-Marvin Positive    Coracoid  Impingement     Internal impingement     Labral     Anterior Slide     [place]'s     Crank     Instability     Apprehension (anterior)     Relocation (anterior)     Anterior Load & Shift     Posterior Load & Shift     Posterior instability (with 90 degrees flex)     Multi-Directional Instability      Sulcus     Biceps      Speed's Positive    Rotator Cuff Tear     Drop Arm     Belly Press     Lift off        PALPATION:  biceps tendon, subacromial space , L UT/levator   JOINT MOBILITY:   CERVICAL SCREEN:  cx retraction worsened L anterior shoudler, reduced shoudler with lateral Sidebend away     Assessment & Plan   CLINICAL IMPRESSIONS  Medical Diagnosis: L shoulder strain    Treatment Diagnosis: L shoulder pain   Impression/Assessment: Patient is a 38 year old female with L shoulder pain  complaints.  The following significant findings have been identified: Pain, Decreased ROM/flexibility, and Decreased strength. These impairments interfere with their ability to perform self care tasks, household chores, and household mobility as compared to previous level of function.     Clinical Decision Making (Complexity):  Clinical Presentation: Stable/Uncomplicated  Clinical Presentation Rationale: based on medical and personal factors listed in PT evaluation  Clinical Decision Making (Complexity): Low complexity    PLAN OF CARE  Treatment Interventions:  Modalities: Cryotherapy, Ultrasound  Interventions: Neuromuscular Re-education, Therapeutic Exercise    Long Term Goals     PT Goal 1  Goal Identifier: reaching awy from the body  Goal Description: allow patient to reach at or above shoudler  Rationale: to maximize safety and independence with performance of ADLs and functional tasks;to maximize safety and independence within the home;to maximize safety and independence with self cares  Target Date: 10/31/23      Frequency of Treatment: 1x/week  Duration of Treatment: 12 weeks    Recommended Referrals to Other Professionals:    Education Assessment:   Learner/Method: Patient;Demonstration;Pictures/Video  Education Comments:  required, however, patient does understand some english    Risks and benefits of evaluation/treatment have been explained.   Patient/Family/caregiver agrees with Plan of Care.     Evaluation Time:     PT Eval, Low Complexity Minutes (35857): 15   Present: Yes: Language: Syriac , ID Number/Identifier:       Signing Clinician: CLAUDETTE Drake [facility]                                                                                   OUTPATIENT PHYSICAL THERAPY      PLAN OF TREATMENT FOR OUTPATIENT REHABILITATION   Patient's Last Name, First Name, M.I.  Jo Ann Curry    YOB: 1985   Provider's Name   [facility]   Medical Record No.  [id number]     Onset Date: 05/20/23  Start of Care Date: 08/03/23     Medical Diagnosis:  L shoulder strain      PT Treatment Diagnosis:  L shoulder pain Plan of Treatment  Frequency/Duration: 1x/week/ 12 weeks    Certification date from 08/03/23 to 10/26/23         See note for plan of treatment details and functional goals     Jose David Whittaker, PT                         I CERTIFY THE NEED FOR THESE SERVICES FURNISHED UNDER        THIS PLAN OF TREATMENT AND WHILE UNDER MY CARE     (Physician attestation of this document indicates review and certification of the therapy plan).                Referring Provider:  Robson Wilson      Initial Assessment  See Epic Evaluation- Start of Care Date: 08/03/23

## 2023-09-09 DIAGNOSIS — S46.912A STRAIN OF LEFT SHOULDER, INITIAL ENCOUNTER: ICD-10-CM

## 2023-09-09 DIAGNOSIS — M25.512 ACUTE PAIN OF LEFT SHOULDER: ICD-10-CM

## 2023-09-11 RX ORDER — DICLOFENAC SODIUM 75 MG/1
75 TABLET, DELAYED RELEASE ORAL 2 TIMES DAILY PRN
Qty: 60 TABLET | Refills: 0 | Status: SHIPPED | OUTPATIENT
Start: 2023-09-11 | End: 2023-10-31

## 2023-09-26 ENCOUNTER — OFFICE VISIT (OUTPATIENT)
Dept: FAMILY MEDICINE | Facility: CLINIC | Age: 38
End: 2023-09-26
Attending: FAMILY MEDICINE
Payer: COMMERCIAL

## 2023-09-26 VITALS
DIASTOLIC BLOOD PRESSURE: 72 MMHG | HEIGHT: 59 IN | RESPIRATION RATE: 16 BRPM | BODY MASS INDEX: 26.79 KG/M2 | OXYGEN SATURATION: 100 % | TEMPERATURE: 98.7 F | HEART RATE: 60 BPM | SYSTOLIC BLOOD PRESSURE: 106 MMHG | WEIGHT: 132.9 LBS

## 2023-09-26 DIAGNOSIS — Z13.21 ENCOUNTER FOR VITAMIN DEFICIENCY SCREENING: ICD-10-CM

## 2023-09-26 DIAGNOSIS — M25.512 ACUTE PAIN OF LEFT SHOULDER: ICD-10-CM

## 2023-09-26 DIAGNOSIS — Z13.220 SCREENING FOR HYPERLIPIDEMIA: ICD-10-CM

## 2023-09-26 DIAGNOSIS — N39.3 FEMALE STRESS INCONTINENCE: ICD-10-CM

## 2023-09-26 DIAGNOSIS — L65.9 HAIR LOSS: ICD-10-CM

## 2023-09-26 DIAGNOSIS — N83.201 RIGHT OVARIAN CYST: ICD-10-CM

## 2023-09-26 DIAGNOSIS — L70.0 ACNE VULGARIS: ICD-10-CM

## 2023-09-26 DIAGNOSIS — Z12.4 CERVICAL CANCER SCREENING: ICD-10-CM

## 2023-09-26 DIAGNOSIS — S46.912A STRAIN OF LEFT SHOULDER, INITIAL ENCOUNTER: ICD-10-CM

## 2023-09-26 DIAGNOSIS — Z13.0 SCREENING FOR DEFICIENCY ANEMIA: ICD-10-CM

## 2023-09-26 DIAGNOSIS — L21.9 SEBORRHEIC DERMATITIS OF SCALP: ICD-10-CM

## 2023-09-26 DIAGNOSIS — K63.89 EPIPLOIC APPENDAGITIS: ICD-10-CM

## 2023-09-26 DIAGNOSIS — K76.0 HEPATIC STEATOSIS: ICD-10-CM

## 2023-09-26 DIAGNOSIS — Z00.00 ROUTINE GENERAL MEDICAL EXAMINATION AT A HEALTH CARE FACILITY: Primary | ICD-10-CM

## 2023-09-26 DIAGNOSIS — Z13.1 SCREENING FOR DIABETES MELLITUS (DM): ICD-10-CM

## 2023-09-26 LAB
ALBUMIN SERPL BCG-MCNC: 4.4 G/DL (ref 3.5–5.2)
ALP SERPL-CCNC: 64 U/L (ref 35–104)
ALT SERPL W P-5'-P-CCNC: 32 U/L (ref 0–50)
ANION GAP SERPL CALCULATED.3IONS-SCNC: 11 MMOL/L (ref 7–15)
AST SERPL W P-5'-P-CCNC: 25 U/L (ref 0–45)
BILIRUB SERPL-MCNC: 0.6 MG/DL
BUN SERPL-MCNC: 16.8 MG/DL (ref 6–20)
CALCIUM SERPL-MCNC: 8.9 MG/DL (ref 8.6–10)
CHLORIDE SERPL-SCNC: 104 MMOL/L (ref 98–107)
CHOLEST SERPL-MCNC: 149 MG/DL
CREAT SERPL-MCNC: 0.72 MG/DL (ref 0.51–0.95)
DEPRECATED CALCIDIOL+CALCIFEROL SERPL-MC: 32 UG/L (ref 20–75)
DEPRECATED HCO3 PLAS-SCNC: 23 MMOL/L (ref 22–29)
EGFRCR SERPLBLD CKD-EPI 2021: >90 ML/MIN/1.73M2
ERYTHROCYTE [DISTWIDTH] IN BLOOD BY AUTOMATED COUNT: 11.4 % (ref 10–15)
FERRITIN SERPL-MCNC: 61 NG/ML (ref 6–175)
GLUCOSE SERPL-MCNC: 89 MG/DL (ref 70–99)
HCT VFR BLD AUTO: 39.3 % (ref 35–47)
HDLC SERPL-MCNC: 45 MG/DL
HGB BLD-MCNC: 13.2 G/DL (ref 11.7–15.7)
LDLC SERPL CALC-MCNC: 90 MG/DL
MCH RBC QN AUTO: 30.6 PG (ref 26.5–33)
MCHC RBC AUTO-ENTMCNC: 33.6 G/DL (ref 31.5–36.5)
MCV RBC AUTO: 91 FL (ref 78–100)
NONHDLC SERPL-MCNC: 104 MG/DL
PLATELET # BLD AUTO: 341 10E3/UL (ref 150–450)
POTASSIUM SERPL-SCNC: 4.2 MMOL/L (ref 3.4–5.3)
PROT SERPL-MCNC: 7.8 G/DL (ref 6.4–8.3)
RBC # BLD AUTO: 4.31 10E6/UL (ref 3.8–5.2)
SODIUM SERPL-SCNC: 138 MMOL/L (ref 135–145)
TRIGL SERPL-MCNC: 68 MG/DL
VIT B12 SERPL-MCNC: 542 PG/ML (ref 232–1245)
WBC # BLD AUTO: 9.7 10E3/UL (ref 4–11)

## 2023-09-26 PROCEDURE — 90472 IMMUNIZATION ADMIN EACH ADD: CPT | Performed by: FAMILY MEDICINE

## 2023-09-26 PROCEDURE — 90471 IMMUNIZATION ADMIN: CPT | Performed by: FAMILY MEDICINE

## 2023-09-26 PROCEDURE — 82607 VITAMIN B-12: CPT | Performed by: FAMILY MEDICINE

## 2023-09-26 PROCEDURE — 90651 9VHPV VACCINE 2/3 DOSE IM: CPT | Performed by: FAMILY MEDICINE

## 2023-09-26 PROCEDURE — 82306 VITAMIN D 25 HYDROXY: CPT | Performed by: FAMILY MEDICINE

## 2023-09-26 PROCEDURE — 80053 COMPREHEN METABOLIC PANEL: CPT | Performed by: FAMILY MEDICINE

## 2023-09-26 PROCEDURE — 99214 OFFICE O/P EST MOD 30 MIN: CPT | Mod: 25 | Performed by: FAMILY MEDICINE

## 2023-09-26 PROCEDURE — 99395 PREV VISIT EST AGE 18-39: CPT | Mod: 25 | Performed by: FAMILY MEDICINE

## 2023-09-26 PROCEDURE — 90746 HEPB VACCINE 3 DOSE ADULT IM: CPT | Performed by: FAMILY MEDICINE

## 2023-09-26 PROCEDURE — 80061 LIPID PANEL: CPT | Performed by: FAMILY MEDICINE

## 2023-09-26 PROCEDURE — 82728 ASSAY OF FERRITIN: CPT | Performed by: FAMILY MEDICINE

## 2023-09-26 PROCEDURE — 85027 COMPLETE CBC AUTOMATED: CPT | Performed by: FAMILY MEDICINE

## 2023-09-26 PROCEDURE — 36415 COLL VENOUS BLD VENIPUNCTURE: CPT | Performed by: FAMILY MEDICINE

## 2023-09-26 RX ORDER — CLINDAMYCIN AND BENZOYL PEROXIDE 10; 50 MG/G; MG/G
GEL TOPICAL 2 TIMES DAILY
Qty: 50 G | Refills: 3 | Status: SHIPPED | OUTPATIENT
Start: 2023-09-26

## 2023-09-26 RX ORDER — KETOCONAZOLE 20 MG/ML
SHAMPOO TOPICAL DAILY PRN
Qty: 360 ML | Refills: 3 | Status: SHIPPED | OUTPATIENT
Start: 2023-09-26

## 2023-09-26 ASSESSMENT — PAIN SCALES - GENERAL: PAINLEVEL: NO PAIN (0)

## 2023-09-26 NOTE — NURSING NOTE
Prior to immunization administration, verified patients identity using patient s name and date of birth. Please see Immunization Activity for additional information.     Screening Questionnaire for Adult Immunization    Are you sick today?   No   Do you have allergies to medications, food, a vaccine component or latex?   No   Have you ever had a serious reaction after receiving a vaccination?   No   Do you have a long-term health problem with heart, lung, kidney, or metabolic disease (e.g., diabetes), asthma, a blood disorder, no spleen, complement component deficiency, a cochlear implant, or a spinal fluid leak?  Are you on long-term aspirin therapy?   No   Do you have cancer, leukemia, HIV/AIDS, or any other immune system problem?   No   Do you have a parent, brother, or sister with an immune system problem?   No   In the past 3 months, have you taken medications that affect  your immune system, such as prednisone, other steroids, or anticancer drugs; drugs for the treatment of rheumatoid arthritis, Crohn s disease, or psoriasis; or have you had radiation treatments?   No   Have you had a seizure, or a brain or other nervous system problem?   No   During the past year, have you received a transfusion of blood or blood    products, or been given immune (gamma) globulin or antiviral drug?   No   For women: Are you pregnant or is there a chance you could become       pregnant during the next month?   No   Have you received any vaccinations in the past 4 weeks?   No     Immunization questionnaire answers were all negative.      Patient instructed to remain in clinic for 15 minutes afterwards, and to report any adverse reactions.     Screening performed by Michelle Martin MA on 9/26/2023 at 9:40 AM.

## 2023-09-26 NOTE — PROGRESS NOTES
SUBJECTIVE:   CC: Jo Ann is an 38 year old who presents for preventive health visit.   Patient is here for annual physical with a  through telephone services  She is also here with other concerns as mentioned below  Hair loss-complaining of scalp itching and recent onset of hair loss for the past few weeks.  Patient denies recent stress, changes in sleep pattern, underlying history of anemia, thyroid disorder  Acne vulgaris-complaining of acne eruptions along the jawline, chin and in the upper neck for the past several months  Abnormal CT-patient wants to review the results of the CT abdomen pelvis that was done in December 2022 for left lower quadrant abdominal pain that she was diagnosed with having a right ovarian cyst and ruptured and left epiploic appendagitis  Patient denies concerns for fever, chills, abdominal pain, change in bowel movements, UTI symptoms, previous similar findings in the past.  Urinary incontinence-patient complaining of ongoing urge and stress incontinence of urination for the past couple of years  Patient denies UTI symptoms, history of [place], hematuria, low back or flank pain, fever, chills.        7/31/2023     8:11 AM   Additional Questions   Roomed by Brenna LUIS                    Social History     Tobacco Use    Smoking status: Never     Passive exposure: Current    Smokeless tobacco: Never   Substance Use Topics    Alcohol use: No             10/22/2021     7:24 AM   Alcohol Use   Prescreen: >3 drinks/day or >7 drinks/week? No          No data to display              Reviewed orders with patient.  Reviewed health maintenance and updated orders accordingly - Yes  Lab work is in process  Labs reviewed in EPIC  BP Readings from Last 3 Encounters:   09/26/23 106/72   07/03/23 116/64   06/03/23 105/67    Wt Readings from Last 3 Encounters:   09/26/23 60.3 kg (132 lb 14.4 oz)   07/03/23 62.1 kg (137 lb)   06/03/23 63.2 kg (139 lb 4 oz)                   Patient Active Problem List   Diagnosis    H. pylori infection    Gastroesophageal reflux disease, esophagitis presence not specified    Hepatic steatosis    Non-ulcer dyspepsia    Gallbladder sludge    Itchy eyes    Allergic conjunctivitis, right    Acne rosacea    Flu vaccine need    Patient desires vaginal birth after  section ()    Female stress incontinence    Epiploic appendagitis    Right ovarian cyst    Hair loss    Acne vulgaris     Past Surgical History:   Procedure Laterality Date    APPENDECTOMY       SECTION N/A 7/3/2017    Procedure:  SECTION;  Primary  Section ;  Surgeon: Luz Maria Kwan MD;  Location: [facility]    HC INSERTION INTRAUTERINE DEVICE      HC REMOVE INTRAUTERINE DEVICE         Social History     Tobacco Use    Smoking status: Never     Passive exposure: Current    Smokeless tobacco: Never   Substance Use Topics    Alcohol use: No     Family History   Problem Relation Age of Onset    Cerebrovascular Disease Maternal Grandfather     Heart Disease Maternal Grandfather     Other - See Comments Daughter         Hydrocephaly         Current Outpatient Medications   Medication Sig Dispense Refill    clindamycin-benzoyl peroxide (BENZACLIN) 1-5 % external gel Apply topically 2 times daily 50 g 3    diclofenac (VOLTAREN) 75 MG EC tablet Take 1 tablet (75 mg) by mouth 2 times daily as needed for moderate pain 60 tablet 0    ketoconazole (NIZORAL) 2 % external shampoo Apply topically daily as needed for itching or irritation 360 mL 3    loratadine (CLARITIN) 10 MG tablet Take 10 mg by mouth       No Known Allergies  Recent Labs   Lab Test 22  1021 10/20/21  0710 10/31/18  1552 18  0929 17  2215   LDL  --  92  --  65  --    HDL  --  58  --  47*  --    TRIG  --  89  --  34  --    ALT 47  --  84*  --  12   CR 0.62 0.70 0.77  --  0.71   GFRESTIMATED >90 >90 87  --  >90  Non  GFR Calc     GFRESTBLACK  --   --  >90   --  >90   GFR Calc     POTASSIUM 3.5 3.8 3.6  --  4.2   TSH 3.32  --   --  1.89  --         Breast Cancer Screening:        10/22/2021     7:24 AM   Breast CA Risk Assessment (FHS-7)   Do you have a family history of breast, colon, or ovarian cancer? No / Unknown       click delete button to remove this line now  Patient under 40 years of age: Routine Mammogram Screening not recommended.   Pertinent mammograms are reviewed under the imaging tab.    History of abnormal Pap smear: NO - age 30-65 PAP every 5 years with negative HPV co-testing recommended      Latest Ref Rng & Units 2020     3:44 PM 2020     3:40 PM 2017     2:29 PM   PAP / HPV   PAP (Historical)  NIL   NIL    HPV 16 DNA NEG^Negative  Negative     HPV 18 DNA NEG^Negative  Negative     Other HR HPV NEG^Negative  Negative       Reviewed and updated as needed this visit by clinical staff     Meds              Reviewed and updated as needed this visit by Provider                   Past Medical History:   Diagnosis Date    Acne vulgaris     Hepatic steatosis 2019      Past Surgical History:   Procedure Laterality Date    APPENDECTOMY       SECTION N/A 7/3/2017    Procedure:  SECTION;  Primary  Section ;  Surgeon: Luz Maria Kwan MD;  [facility]    HC INSERTION INTRAUTERINE DEVICE      HC REMOVE INTRAUTERINE DEVICE       OB History    Para Term  AB Living   3 3 3 0 0 3   SAB IAB Ectopic Multiple Live Births   0 0 0 0 3      # Outcome Date GA Lbr [place]/2nd Weight Sex Delivery Anes PTL Lv   3 Term 20 39w6d  3.43 kg (7 lb 9 oz) F   N SUSHILA      Apgar1: 8  Apgar5: 9   2 Term 17 39w2d  3.75 kg (8 lb 4.3 oz) F CS-LTranv Spinal  SUSHILA      Birth Comments: 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 Kayden Rubio) and neurology as outpatient - saw in hospital  Passed hearing screen and CCHD  Hep B and  "Vitamin K given      Complications: Fetal hydrocephalus affecting management of mother in thomas pregnancy      Name: Dina      Apgar1: 8  Apgar5: 9   1 Term 08 40w0d  2.722 kg (6 lb) F    SUSHILA      Name: Soumya       Review of Systems  CONSTITUTIONAL: NEGATIVE for fever, chills, change in weight  INTEGUMENTARY/SKIN: As above  EYES: NEGATIVE for vision changes or irritation  ENT: NEGATIVE for ear, mouth and throat problems  RESP: NEGATIVE for significant cough or SOB  BREAST: NEGATIVE for masses, tenderness or discharge  CV: NEGATIVE for chest pain, palpitations or peripheral edema  GI: NEGATIVE for nausea, abdominal pain, heartburn, or change in bowel habits  GI: As above   female: As above  MUSCULOSKELETAL: NEGATIVE for significant arthralgias or myalgia  NEURO: NEGATIVE for weakness, dizziness or paresthesias  ENDOCRINE: NEGATIVE for temperature intolerance, skin/hair changes  HEME/ALLERGY/IMMUNE: NEGATIVE for bleeding problems  PSYCHIATRIC: NEGATIVE for changes in mood or affect     OBJECTIVE:   /72 (BP Location: Right arm, Patient Position: Sitting, Cuff Size: Adult Regular)   Pulse 60   Temp 98.7  F (37.1  C) (Oral)   Resp 16   Ht 1.499 m (4' 11\")   Wt 60.3 kg (132 lb 14.4 oz)   LMP 2023 (Exact Date)   SpO2 100%   BMI 26.84 kg/m    Physical Exam  GENERAL: healthy, alert and no distress  EYES: Eyes grossly normal to inspection, PERRL and conjunctivae and sclerae normal  HENT: ear canals and TM's normal, nose and mouth without ulcers or lesions  NECK: no adenopathy, no asymmetry, masses, or scars and thyroid normal to palpation  RESP: lungs clear to auscultation - no rales, rhonchi or wheezes  BREAST: normal without masses, tenderness or nipple discharge and no palpable axillary masses or adenopathy  CV: regular rate and rhythm, normal S1 S2, no S3 or S4, no murmur, click or rub, no peripheral edema and peripheral pulses strong  ABDOMEN: soft, nontender, no hepatosplenomegaly, " no masses and bowel sounds normal  MS: no gross musculoskeletal defects noted, no edema  SKIN: Dry flaky scalp  NEURO: Normal strength and tone, mentation intact and speech normal  PSYCH: mentation appears normal, affect normal/bright    Diagnostic Test Results:  Labs reviewed in Epic    ASSESSMENT/PLAN:   (Z00.00) Routine general medical examination at a health care facility  (primary encounter diagnosis)  Comment:   Plan: Discussed on regular exercises, healthy eating, self breast exams monthly and routine dental checks.      (L65.9) Hair loss  Comment:   Plan: CBC with platelets, Ferritin, Vitamin B12,         Vitamin D Deficiency, ketoconazole (NIZORAL) 2         % external shampoo,         Ddx-stress/anemia/vitamin deficiencies/seborrheic dermatitis of scalp  Recommended to start using Nizoral shampoo daily for 1 week followed by 2-3 times a week  We will get the labs for further evaluation    (L21.9) Seborrheic dermatitis of scalp  Comment:   Plan: ketoconazole (NIZORAL) 2 % external shampoo        as above      (N39.3) Female stress incontinence  Comment:   Plan: Physical Therapy Referral        Recommended to start on pelvic floor physical therapy, follow-up as needed    (L70.0) Acne vulgaris  Comment:   Plan: clindamycin-benzoyl peroxide (BENZACLIN) 1-5 %         external gel        Prescription given for BenzaClin gel to use twice daily, follow-up if no improvement noted in 3 months or sooner if needed    (K63.89) Epiploic appendagitis  Comment:   Plan: Reviewed CT abdomen pelvis from December 2022 showing epiploic appendagitis  Reassured patient, recommended that NSAIDs are the treatment of choice  Patient does not have current concerning symptoms at this time, continue to monitor, follow-up as needed    (N83.201) Right ovarian cyst  Comment:   Plan: Noted 1.9 cm right adnexal cyst with recommendation for no further follow-up in her age group  Follow-up as needed for concerns for pelvic pain, abnormal  vaginal bleeding  Patient verbalised understanding and is agreeable to the plan.      (Z12.4) Cervical cancer screening  Comment:   Plan: Patient is not due for Pap this year    (Z13.0) Screening for deficiency anemia  Comment:   Plan: CBC with platelets            (Z13.1) Screening for diabetes mellitus (DM)  Comment:   Plan: Comprehensive metabolic panel (BMP + Alb, Alk         Phos, ALT, AST, Total. Bili, TP),               (Z13.21) Encounter for vitamin deficiency screening  Comment:   Plan: Vitamin B12, Vitamin D Deficiency            (Z13.220) Screening for hyperlipidemia  Comment:   Plan: Lipid panel reflex to direct LDL Fasting            (K76.0) Hepatic steatosis  Comment:   Plan: Comprehensive metabolic panel (BMP + Alb, Alk         Phos, ALT, AST, Total. Bili, TP)                  COUNSELING:  Reviewed preventive health counseling, as reflected in patient instructions  Special attention given to:        Regular exercise       Healthy diet/nutrition       Vision screening       Immunizations  Vaccinated for: Hepatitis B and Human Papillomavirus          She reports that she has never smoked. She has been exposed to tobacco smoke. She has never used smokeless tobacco.          Robson Wilson MD  [facility]  Chart documentation done in part with Dragon Voice recognition Software. Although reviewed after completion, some word and grammatical error may remain.

## 2023-09-27 RX ORDER — DICLOFENAC SODIUM 75 MG/1
75 TABLET, DELAYED RELEASE ORAL 2 TIMES DAILY PRN
Qty: 60 TABLET | Refills: 0 | OUTPATIENT
Start: 2023-09-27

## 2023-09-27 NOTE — RESULT ENCOUNTER NOTE
Gustavo Cherry,  Your recent blood work showed normal hemoglobin, liver and kidney functions, fasting blood sugar, ferritin-iron stores in the body, vitamin D and B12.  Your fasting cholesterol numbers are in good range.   Let me know if you have any questions. Take care.  Robson Wilson MD

## 2023-10-16 ENCOUNTER — MYC MEDICAL ADVICE (OUTPATIENT)
Dept: FAMILY MEDICINE | Facility: CLINIC | Age: 38
End: 2023-10-16
Payer: COMMERCIAL

## 2023-10-16 ENCOUNTER — NURSE TRIAGE (OUTPATIENT)
Dept: NURSING | Facility: CLINIC | Age: 38
End: 2023-10-16
Payer: COMMERCIAL

## 2023-10-16 ENCOUNTER — OFFICE VISIT (OUTPATIENT)
Dept: URGENT CARE | Facility: URGENT CARE | Age: 38
End: 2023-10-16
Payer: COMMERCIAL

## 2023-10-16 VITALS
WEIGHT: 132.8 LBS | HEART RATE: 69 BPM | BODY MASS INDEX: 26.82 KG/M2 | OXYGEN SATURATION: 100 % | SYSTOLIC BLOOD PRESSURE: 116 MMHG | DIASTOLIC BLOOD PRESSURE: 76 MMHG | TEMPERATURE: 98.2 F

## 2023-10-16 DIAGNOSIS — L03.114 CELLULITIS OF LEFT UPPER EXTREMITY: Primary | ICD-10-CM

## 2023-10-16 PROCEDURE — 99213 OFFICE O/P EST LOW 20 MIN: CPT | Performed by: NURSE PRACTITIONER

## 2023-10-16 RX ORDER — DOXYCYCLINE HYCLATE 100 MG
100 TABLET ORAL 2 TIMES DAILY
Qty: 14 TABLET | Refills: 0 | Status: SHIPPED | OUTPATIENT
Start: 2023-10-16 | End: 2023-10-23

## 2023-10-16 NOTE — TELEPHONE ENCOUNTER
S-(situation): left deltoid swelling    B-(background):   Pt received vaccines on 9/26 visit. Hepatitis B vaccine injected on left deltoid. Pt had 2-3 days of pain on left upper arm following shot, then was fine and symptoms started again on 10/13    A-(assessment):   Severe pain on left deltoid since Friday.  Swollen and red  Feels warm to touch    No fever  No SOB. Has cough, felt like throat was closing in on Saturday but was due to inhaling dust.      R-(recommendations):   Be seen today. Transferred to . If there are no clinic openings, pt knows to go to  ([place] or [place])    aMral Suero RN/[facility] Nurse Advisor        Reason for Disposition   Redness or red streak around the injection site begins > 48 hours after shot    Additional Information   Negative: Difficulty breathing or swallowing starts within 2 hours after injection   Negative: Difficult to awaken or acting confused (e.g., disoriented, slurred speech)   Negative: Unresponsive, passed out, or very weak   Negative: Sounds like a life-threatening emergency to the triager   Negative: COVID-19 vaccine reaction suspected (e.g., fever, headache, muscle aches occurring during days 13 after getting vaccine)   Negative: COVID-19 vaccine, questions about   Negative: Fever > 104 F (40 C)   Negative: Measles vaccine rash (onset day 6-12) and purple or blood-colored   Negative: Sounds like a severe, unusual reaction to the triager   Negative: Fever > 101 F (38.3 C) begins > 48 hours after getting vaccine and over 60 years of age   Negative: Fever > 100.0 F (37.8 C) and has diabetes mellitus or a weak immune system (e.g., HIV positive, cancer chemotherapy, organ transplant, splenectomy, chronic steroids)   Negative: Fever > 100.0 F (37.8 C) and bedridden (e.g., CVA, chronic illness, recovering from surgery)    Protocols used: Immunization Vepsftdeo-F-KM

## 2023-10-16 NOTE — PROGRESS NOTES
Assessment & Plan     1. Cellulitis of left upper extremity  Home care reviewed. Patient verbalized understanding; will monitor symptoms closely. Reviewed s/e to medications.   Follow up with primary care in 1 week if symptoms not improving.     Handout given from epic and reviewed.    - doxycycline hyclate (VIBRA-TABS) 100 MG tablet; Take 1 tablet (100 mg) by mouth 2 times daily for 7 days  Dispense: 14 tablet; Refill: 0    TREVA Zuluaga [facility]     Jo Ann is a 38 year old female who presents to clinic today for the following health issues:  Chief Complaint   Patient presents with    vaccine reaction     Pt c/o of arm pain from vaccine on 9/26/23. Pt noticed arm pain, red, warm to the touch on Friday, sx worsened on Saturday. Pt is unable to move arm without pain, pain when touching area. Pt was instructed to come into UC      HPI    Patient presents to clinic states history of recent vaccination left upper deltoid muscle.  Since this time has had increased pain tenderness redness warmth.  She notes last night had some mild malaise without fever.    Review of Systems  Constitutional, HEENT, cardiovascular, pulmonary, gi and gu systems are negative, except as otherwise noted.      Objective    /76   Pulse 69   Temp 98.2  F (36.8  C) (Oral)   Wt 60.2 kg (132 lb 12.8 oz)   LMP 09/22/2023 (Exact Date)   SpO2 100%   BMI 26.82 kg/m    Physical Exam   GENERAL: healthy, alert and no distress  NECK: no adenopathy, no asymmetry, masses, or scars and thyroid normal to palpation  RESP: lungs clear to auscultation - no rales, rhonchi or wheezes  CV: regular rate and rhythm, normal S1 S2, no S3 or S4, no murmur, click or rub, no peripheral edema and peripheral pulses strong  ABDOMEN: soft, nontender, no hepatosplenomegaly, no masses and bowel sounds normal  MS: no gross musculoskeletal defects noted, no edema  SKIN: Left deltoid region tender with a firm  lump under skin patient states injection site was here.  Negative for drainage or fluctuance.  Mild warmth and erythema noted

## 2023-10-31 DIAGNOSIS — S46.912A STRAIN OF LEFT SHOULDER, INITIAL ENCOUNTER: ICD-10-CM

## 2023-10-31 DIAGNOSIS — M25.512 ACUTE PAIN OF LEFT SHOULDER: ICD-10-CM

## 2023-10-31 RX ORDER — DICLOFENAC SODIUM 75 MG/1
75 TABLET, DELAYED RELEASE ORAL 2 TIMES DAILY PRN
Qty: 60 TABLET | Refills: 0 | Status: SHIPPED | OUTPATIENT
Start: 2023-10-31 | End: 2023-11-16

## 2023-11-02 ENCOUNTER — OFFICE VISIT (OUTPATIENT)
Dept: FAMILY MEDICINE | Facility: CLINIC | Age: 38
End: 2023-11-02
Payer: COMMERCIAL

## 2023-11-02 VITALS
SYSTOLIC BLOOD PRESSURE: 119 MMHG | TEMPERATURE: 98.7 F | BODY MASS INDEX: 26.57 KG/M2 | WEIGHT: 131.8 LBS | OXYGEN SATURATION: 98 % | HEIGHT: 59 IN | HEART RATE: 77 BPM | DIASTOLIC BLOOD PRESSURE: 68 MMHG | RESPIRATION RATE: 14 BRPM

## 2023-11-02 DIAGNOSIS — M75.22 BICEPS TENDONITIS, LEFT: ICD-10-CM

## 2023-11-02 DIAGNOSIS — L91.0 KELOID SCAR: Primary | ICD-10-CM

## 2023-11-02 DIAGNOSIS — M62.9 MUSCULOSKELETAL DISORDER INVOLVING UPPER TRAPEZIUS MUSCLE: ICD-10-CM

## 2023-11-02 PROCEDURE — 99214 OFFICE O/P EST MOD 30 MIN: CPT | Performed by: PHYSICIAN ASSISTANT

## 2023-11-02 RX ORDER — CLOBETASOL PROPIONATE 0.5 MG/G
CREAM TOPICAL 2 TIMES DAILY
Qty: 45 G | Refills: 1 | Status: SHIPPED | OUTPATIENT
Start: 2023-11-02

## 2023-11-02 RX ORDER — NAPROXEN 500 MG/1
500 TABLET ORAL 2 TIMES DAILY WITH MEALS
Qty: 60 TABLET | Refills: 1 | Status: SHIPPED | OUTPATIENT
Start: 2023-11-02

## 2023-11-02 RX ORDER — METHOCARBAMOL 500 MG/1
500 TABLET, FILM COATED ORAL 3 TIMES DAILY PRN
Qty: 60 TABLET | Refills: 3 | Status: SHIPPED | OUTPATIENT
Start: 2023-11-02

## 2023-11-02 NOTE — PROGRESS NOTES
Assessment & Plan   Problem List Items Addressed This Visit    None  Visit Diagnoses       Keloid scar    -  Primary    Relevant Medications    clobetasol (TEMOVATE) 0.05 % external cream    Other Relevant Orders    Adult Dermatology  Referral    Biceps tendonitis, left        Relevant Medications    naproxen (NAPROSYN) 500 MG tablet    methocarbamol (ROBAXIN) 500 MG tablet    Other Relevant Orders    Orthopedic  Referral    Physical Therapy Referral    Musculoskeletal disorder involving upper trapezius muscle        Relevant Medications    naproxen (NAPROSYN) 500 MG tablet    methocarbamol (ROBAXIN) 500 MG tablet    Other Relevant Orders    Physical Therapy Referral           Naproxen 500 mg twice a day as needed with food   Robaxin 500 mg three times a day as needed   Start PT  Follow up with ortho for steroid injection if not better in 1-2 months     Apply clobetasol cream twice a day to the keloid scars  Follow up with dermatology of not better in 3-6 months     25 minutes spent by me on the date of the encounter doing chart review, history and exam, documentation and further activities per the note           Olga Dave PA-C  [facility] JUDITH Cherry is a 38 year old, presenting for the following health issues:  Musculoskeletal Problem and Shoulder Pain      History of Present Illness       Reason for visit:  I have pain in my arm and it hurts in the area where applied the vaccine and i got cellulitis in the muscle and on the rigth side of my back i had a cat attack and one of the scars hurts and grows    She eats 2-3 servings of fruits and vegetables daily.She consumes 0 sweetened beverage(s) daily.She exercises with enough effort to increase her heart rate 9 or less minutes per day.  She exercises with enough effort to increase her heart rate 3 or less days per week.   She is taking medications regularly.         Concern - Pain in both  "shoulders   Onset: 4-5 months  Description: anterior left shoulder . Also, has a knot in left arm post Hep b injection , but resolved now   Intensity: moderate, 8/10  Progression of Symptoms:  worsening  Accompanying Signs & Symptoms: movement  Previous history of similar problem: none  Precipitating factors:        Worsened by: movement  Alleviating factors:        Improved by: nothing  Therapies tried and outcome: None    Patient reports that she had some pain and soreness in the left upper arm and shoulder before the vaccination. The lump occurred only after the shot on 9/29/23. The immunization lump has resolved after antibiotic treatment for injection site cellulitis.  Pt is here about a pain in the anterior shoulder.       Scars on the right posterior shoulder    Duration: 2 years  Description (location/character/radiation): scars that are thick and itchy post cat scratches 2 years ago  Intensity:  moderate  Accompanying signs and symptoms: itchy  History (similar episodes/previous evaluation): cat attacked and scratched   Precipitating or alleviating factors:   Therapies tried and outcome: None       Review of Systems   Constitutional, HEENT, cardiovascular, pulmonary, gi and gu systems are negative, except as otherwise noted.      Objective    /68 (BP Location: Left arm, Patient Position: Sitting, Cuff Size: Adult Large)   Pulse 77   Temp 98.7  F (37.1  C) (Oral)   Resp 14   Ht 1.51 m (4' 11.45\")   Wt 59.8 kg (131 lb 12.8 oz)   LMP 10/20/2023 (Exact Date)   SpO2 98%   Breastfeeding No   BMI 26.22 kg/m    Body mass index is 26.22 kg/m .  Physical Exam   GENERAL: healthy, alert and no distress  MS: LUE exam shows normal strength and muscle mass, no deformities, no erythema, induration, or nodules, no evidence of joint effusion, ROM of all joints is normal, and no evidence of joint instability. Tenderness over left proximal bicep tendon. No lump in the left deltoid at the site of injection, no " swelling or redness.   SKIN: right upper back 2 keloid scars   PSYCH: mentation appears normal, affect normal/bright

## 2023-11-16 DIAGNOSIS — S46.912A STRAIN OF LEFT SHOULDER, INITIAL ENCOUNTER: ICD-10-CM

## 2023-11-16 DIAGNOSIS — M25.512 ACUTE PAIN OF LEFT SHOULDER: ICD-10-CM

## 2023-11-16 RX ORDER — DICLOFENAC SODIUM 75 MG/1
75 TABLET, DELAYED RELEASE ORAL 2 TIMES DAILY PRN
Qty: 60 TABLET | Refills: 0 | Status: SHIPPED | OUTPATIENT
Start: 2023-11-16

## 2023-11-16 NOTE — TELEPHONE ENCOUNTER
Refill is sent as requested.  Please inform patient to follow-up,If she continues to have persistent pain

## 2023-11-17 ENCOUNTER — THERAPY VISIT (OUTPATIENT)
Dept: PHYSICAL THERAPY | Facility: CLINIC | Age: 38
End: 2023-11-17
Attending: PHYSICIAN ASSISTANT
Payer: COMMERCIAL

## 2023-11-17 DIAGNOSIS — M62.9 MUSCULOSKELETAL DISORDER INVOLVING UPPER TRAPEZIUS MUSCLE: ICD-10-CM

## 2023-11-17 DIAGNOSIS — M25.512 CHRONIC LEFT SHOULDER PAIN: Primary | ICD-10-CM

## 2023-11-17 DIAGNOSIS — M75.22 BICEPS TENDONITIS, LEFT: ICD-10-CM

## 2023-11-17 DIAGNOSIS — G89.29 CHRONIC LEFT SHOULDER PAIN: Primary | ICD-10-CM

## 2023-11-17 PROCEDURE — 97110 THERAPEUTIC EXERCISES: CPT | Mod: GP

## 2023-11-17 PROCEDURE — 97161 PT EVAL LOW COMPLEX 20 MIN: CPT | Mod: GP

## 2023-11-17 NOTE — PROGRESS NOTES
PHYSICAL THERAPY EVALUATION  Type of Visit: Evaluation    See electronic medical record for Abuse and Falls Screening details.    Subjective   Pt is a 37 yo female presenting to PT with L shoulder pain. Pt states she was pushing up off of an intertube in April and began to feel pain a few days later. Pt also recently received a vaccine in her L shoulder that resulted in a small bump near her biceps tendon, as well as pain and cellulitis that has since been resolved. Pt now has anterior shoulder pain, posterior neck pain, and intermittent pain that travels down her arm into her 4th and 5th fingers. Aggravating factors include quick movements, reaching overhead/behind back, and lifting. Pt also feels that her  strength has weakened. 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 Pt is left hand dominant. Wishes to return to pain free work.         Presenting condition or subjective complaint: My shoulder hurts  Date of onset: 04/01/23    Relevant medical history:     Dates & types of surgery:      Prior diagnostic imaging/testing results:       Prior therapy history for the same diagnosis, illness or injury: No      Prior Level of Function  Transfers: Independent  Ambulation: Independent  ADL: Independent      Living Environment  Social support:     Type of home: 2-story   Stairs to enter the home: Yes 2     Ramp: No   Stairs inside the home: Yes 15 Is there a railing: Yes   Help at home: None  Equipment owned:       Employment: Yes    Hobbies/Interests:      Patient goals for therapy: make quick movements, not hurt    Pain assessment: Pain present     Objective   SHOULDER EVALUATION  PAIN: 8/10 at worst   INTEGUMENTARY (edema, incisions):  No signs of infection around L shoulder. Small bump present near proximal biceps tendon upon palpation.  POSTURE: Sitting Posture: Rounded shoulders, Forward head  ROM:  L shoulder AROM: Flex = 80 deg d/t pain. Abd = 100 deg d/t pain. ER = 25 deg d/t  pain. IR = upper thoracic spine. R shoulder AROM WNL.   L shoulder PROM: WNL except for 100 deg abd d/t pain.   STRENGTH:  R shoulder 5/5. L shoulder 3/5 flex and abd and painful, 4/5 IR/ER and painful.  SPECIAL TESTS:  Deferred d/t pain  PALPATION:  Increased tenderness to proximal biceps tendon, RC insertions/muscle bellies, and upper trapezius  CERVICAL SCREEN: WNL. Tightness with combined flex/opposite rot and side bending B.   Ulnar Nerve Tension: + , L     Assessment & Plan   CLINICAL IMPRESSIONS  Medical Diagnosis: Biceps tendonitis, MSK disorder involving upper trapezius    Treatment Diagnosis: L shoulder pain, weakness, neck pain with radiculopathy   Impression/Assessment: Patient is a 38 year old female with L shoulder pain and neck pain complaints.  The following significant findings have been identified: Pain, Decreased ROM/flexibility, Decreased strength, Impaired muscle performance, and Decreased activity tolerance. These impairments interfere with their ability to perform self care tasks, work tasks, recreational activities, household chores, and driving  as compared to previous level of function.     Clinical Decision Making (Complexity):  Clinical Presentation: Stable/Uncomplicated  Clinical Presentation Rationale: based on medical and personal factors listed in PT evaluation  Clinical Decision Making (Complexity): Low complexity    PLAN OF CARE  Treatment Interventions:  Modalities: Cryotherapy, E-stim  Interventions: Manual Therapy, Neuromuscular Re-education, Therapeutic Activity, Therapeutic Exercise    Long Term Goals     PT Goal 1  Goal Identifier: ROM  Goal Description: Pt will acheive full and pain free AROM in her L shoulder  Rationale: to maximize safety and independence with performance of ADLs and functional tasks  Goal Progress: 80 deg flex, 100 deg abd, 25 deg ER, upper thoracic IR d/t pain  Target Date: 01/13/24      Frequency of Treatment: 1x/wk  Duration of Treatment:  8wks    Recommended Referrals to Other Professionals:  none  Education Assessment:   Learner/Method: Patient;Demonstration;Pictures/Video  Education Comments: RC strain from original injury, now inflammation of RC during overhead motion d/t work demands. Focusing first on ROM and postural restoration, then beginning periscapular/RC strength    Risks and benefits of evaluation/treatment have been explained.   Patient/Family/caregiver agrees with Plan of Care.     Evaluation Time:     PT Eval, Low Complexity Minutes (68977): 30     Signing Clinician: KINGSTON BASS, CLAUDETTE      [facility]                                                                                   OUTPATIENT PHYSICAL THERAPY      PLAN OF TREATMENT FOR OUTPATIENT REHABILITATION   Patient's Last Name, First Name, Jo Ann Amaro    YOB: 1985   Provider's Name   [facility]   Medical Record No.  [id number]     Onset Date: 04/01/23  Start of Care Date: 11/17/23     Medical Diagnosis:  Biceps tendonitis, MSK disorder involving upper trapezius      PT Treatment Diagnosis:  L shoulder pain, weakness, neck pain with radiculopathy Plan of Treatment  Frequency/Duration: 1x/wk/ 8wks    Certification date from 11/17/23 to 01/13/24         See note for plan of treatment details and functional goals     KINGSTON BASS, PT                           Referring Provider:  Olga Dave    Initial Assessment  See Epic Evaluation- Start of Care Date: 11/17/23

## 2023-12-14 ENCOUNTER — THERAPY VISIT (OUTPATIENT)
Dept: PHYSICAL THERAPY | Facility: CLINIC | Age: 38
End: 2023-12-14
Payer: COMMERCIAL

## 2023-12-14 DIAGNOSIS — G89.29 CHRONIC LEFT SHOULDER PAIN: Primary | ICD-10-CM

## 2023-12-14 DIAGNOSIS — M25.512 CHRONIC LEFT SHOULDER PAIN: Primary | ICD-10-CM

## 2023-12-14 PROCEDURE — 97110 THERAPEUTIC EXERCISES: CPT | Mod: GP

## 2023-12-14 PROCEDURE — 97140 MANUAL THERAPY 1/> REGIONS: CPT | Mod: GP

## 2024-03-29 NOTE — RESULT ENCOUNTER NOTE
Medication:     blood glucose (OneTouch Ultra) test strip     Last office visit date: 7/21/23  Medication Refill Protocol Failed.  Medication Refill Protocol Failed. Courtesy Refill provided after blood glucose (OneTouch Ultra) test strip per protocol guidelines. Writer confirmed, courtesy refill was not a duplicate.     Please inform patient of negative screening test for lupus  We will follow-up with the treatment plan for the facial rash, follow-up in 2 months if no better by then or sooner if needed.

## 2024-06-25 PROBLEM — M25.512 CHRONIC LEFT SHOULDER PAIN: Status: RESOLVED | Noted: 2023-11-17 | Resolved: 2024-06-25

## 2024-06-25 PROBLEM — G89.29 CHRONIC LEFT SHOULDER PAIN: Status: RESOLVED | Noted: 2023-11-17 | Resolved: 2024-06-25

## 2024-08-27 ENCOUNTER — PATIENT OUTREACH (OUTPATIENT)
Dept: CARE COORDINATION | Facility: CLINIC | Age: 39
End: 2024-08-27

## 2024-09-10 ENCOUNTER — PATIENT OUTREACH (OUTPATIENT)
Dept: CARE COORDINATION | Facility: CLINIC | Age: 39
End: 2024-09-10

## 2024-11-30 ENCOUNTER — HEALTH MAINTENANCE LETTER (OUTPATIENT)
Age: 39
End: 2024-11-30

## 2025-03-09 ENCOUNTER — HEALTH MAINTENANCE LETTER (OUTPATIENT)
Age: 40
End: 2025-03-09

## 2025-03-25 ENCOUNTER — PATIENT OUTREACH (OUTPATIENT)
Dept: CARE COORDINATION | Facility: CLINIC | Age: 40
End: 2025-03-25
Payer: COMMERCIAL

## 2025-05-27 NOTE — MR AVS SNAPSHOT
After Visit Summary   10/31/2018    Jo Ann Patel    MRN: [id number]           Patient Information     Date Of Birth          1985        Visit Information        Provider Department      10/31/2018 3:15 PM Robson Wilson MD; [place] [employer] [facility]        Today's Diagnoses     Dyshidrotic eczema    -  1    Bilateral leg pain        Screening for thyroid disorder        Low HDL (under 40)        Dyspepsia        Bloating        Restless leg          Care Instructions    Get the labs today  Start on ZANTAC twice daily          Follow-ups after your visit        Your next 10 appointments already scheduled     Nov 12, 2018  9:00 AM CST   Nurse Only with NURSE ONLY WOMENS   [facility]    [id number] [place] [address] [address]  [facility] [address] [phone]              Future tests that were ordered for you today     Open Future Orders        Priority Expected Expires Ordered    H Pylori antigen, stool Routine  11/30/2018 10/31/2018            Who to contact     If you have questions or need follow up information about today's clinic visit or your schedule please contact [facility] directly at [phone].  Normal or non-critical lab and imaging results will be communicated to you by MyChart, letter or phone within 4 business days after the clinic has received the results. If you do not hear from us within 7 days, please contact the clinic through [employer] or phone. If you have a critical or abnormal lab result, we will notify you by phone as soon as possible.  Submit refill requests through [employer] or call your pharmacy and they will forward the refill request to us. Please allow 3 business days for your refill to be completed.          Additional Information About Your Visit        [employer] Information     [employer] is an electronic gateway that provides easy, online access to your  medical records. With [employer], you can request a clinic appointment, read your test results, renew a prescription or communicate with your care team.     To sign up for [employer] visit the website at [email or web address]   You will be asked to enter the access code listed below, as well as some personal information. Please follow the directions to create your username and password.     Your access code is: [email or web address]  Expires: 12/10/2018  3:10 PM     Your access code will  in 90 days. If you need help or a new code, please contact your [facility] or call [phone] for assistance.        Care EveryWhere ID     This is your Care EveryWhere ID. This could be used by other organizations to access your [place] medical records  [id number]        Your Vitals Were     Pulse Temperature Pulse Oximetry BMI (Body Mass Index)          82 98.4  F (36.9  C) (Oral) 96% 24.13 kg/m2         Blood Pressure from Last 3 Encounters:   10/31/18 106/71   18 102/66   18 94/62    Weight from Last 3 Encounters:   10/31/18 120 lb 8 oz (54.7 kg)   18 118 lb 1.6 oz (53.6 kg)   18 119 lb (54 kg)              We Performed the Following     CBC with platelets and differential     Comprehensive metabolic panel     Ferritin     Methylmalonic acid     Vitamin B12     Vitamin D Deficiency          Today's Medication Changes          These changes are accurate as of 10/31/18  3:49 PM.  If you have any questions, ask your nurse or doctor.               Start taking these medicines.        Dose/Directions    augmented betamethasone dipropionate 0.05 % cream   Commonly known as:  DIPROLENE-AF   Used for:  Dyshidrotic eczema   Started by:  Robson Wilson MD        Apply sparingly to affected area twice daily as needed.  Do not apply to face.   Quantity:  50 g   Refills:  0       ranitidine 150 MG tablet   Commonly known as:  ZANTAC   Used for:  Bloating, Dyspepsia   Started by:   Robson Wilson MD        Dose:  150 mg   Take 1 tablet (150 mg) by mouth 2 times daily   Quantity:  60 tablet   Refills:  0            Where to get your medicines      These medications were sent to [id number] - [place], [place] - [address] [facility]  [address], [facility] [address]    Hours:  24-hours Phone:  [phone]     augmented betamethasone dipropionate 0.05 % cream    ranitidine 150 MG tablet                Primary Care Provider Office Phone # Fax #    [facility] [phone] [address]  [facility] [address]        Equal Access to Services     IRAM GORDON : Hadii delvin augustino Somindy, waaxda luqadaha, qaybta kaalmada adeegyada, asim unger . So [facility] [phone].    ATENCIÓN: Si habla español, tiene a schafer disposición servicios gratuitos de asistencia lingüística. Llame al [phone].    We comply with applicable federal civil rights laws and [place] laws. We do not discriminate on the basis of race, color, national origin, age, disability, sex, sexual orientation, or gender identity.            Thank you!     Thank you for choosing [facility]  for your care. Our goal is always to provide you with excellent care. Hearing back from our patients is one way we can continue to improve our services. Please take a few minutes to complete the written survey that you may receive in the mail after your visit with us. Thank you!             Your Updated Medication List - Protect others around you: Learn how to safely use, store and throw away your medicines at www.disposemymeds.org.          This list is accurate as of 10/31/18  3:49 PM.  Always use your most recent med list.                   Brand Name Dispense Instructions for use Diagnosis    augmented betamethasone dipropionate 0.05 % cream    DIPROLENE-AF    50 g    Apply sparingly to affected  area twice daily as needed.  Do not apply to face.    Dyshidrotic eczema       * medroxyPROGESTERone 150 MG/ML injection    DEPO-PROVERA    1 mL    Inject 1 mL (150 mg) into the muscle every 3 months    Encounter for initial prescription of injectable contraceptive       * medroxyPROGESTERone 150 MG/ML injection    DEPO-PROVERA    3 mL    Inject 1 mL (150 mg) into the muscle every 3 months    Encounter for other contraceptive management       ranitidine 150 MG tablet    ZANTAC    60 tablet    Take 1 tablet (150 mg) by mouth 2 times daily    Bloating, Dyspepsia       * Notice:  This list has 2 medication(s) that are the same as other medications prescribed for you. Read the directions carefully, and ask your doctor or other care provider to review them with you.       No

## 2025-08-13 ENCOUNTER — OFFICE VISIT (OUTPATIENT)
Dept: URGENT CARE | Facility: URGENT CARE | Age: 40
End: 2025-08-13
Payer: COMMERCIAL

## 2025-08-13 VITALS
OXYGEN SATURATION: 97 % | HEART RATE: 60 BPM | WEIGHT: 147 LBS | BODY MASS INDEX: 27.05 KG/M2 | SYSTOLIC BLOOD PRESSURE: 127 MMHG | RESPIRATION RATE: 24 BRPM | HEIGHT: 62 IN | TEMPERATURE: 99 F | DIASTOLIC BLOOD PRESSURE: 82 MMHG

## 2025-08-13 DIAGNOSIS — Z20.822 EXPOSURE TO 2019 NOVEL CORONAVIRUS: Primary | ICD-10-CM

## 2025-08-13 DIAGNOSIS — B34.9 VIRAL SYNDROME: ICD-10-CM

## 2025-08-13 PROCEDURE — 99213 OFFICE O/P EST LOW 20 MIN: CPT | Performed by: PHYSICIAN ASSISTANT

## 2025-08-13 PROCEDURE — 87635 SARS-COV-2 COVID-19 AMP PRB: CPT | Performed by: PHYSICIAN ASSISTANT

## 2025-08-13 PROCEDURE — 3074F SYST BP LT 130 MM HG: CPT | Performed by: PHYSICIAN ASSISTANT

## 2025-08-13 PROCEDURE — 3079F DIAST BP 80-89 MM HG: CPT | Performed by: PHYSICIAN ASSISTANT

## 2025-08-13 RX ORDER — PSEUDOEPHEDRINE HCL 30 MG/1
30 TABLET, FILM COATED ORAL EVERY 4 HOURS PRN
Qty: 20 TABLET | Refills: 0 | Status: SHIPPED | OUTPATIENT
Start: 2025-08-13

## 2025-08-13 RX ORDER — IBUPROFEN 800 MG/1
800 TABLET, FILM COATED ORAL EVERY 8 HOURS PRN
Qty: 30 TABLET | Refills: 0 | Status: SHIPPED | OUTPATIENT
Start: 2025-08-13

## 2025-08-13 ASSESSMENT — ENCOUNTER SYMPTOMS
APPETITE CHANGE: 1
COUGH: 0
HEADACHES: 1
SORE THROAT: 1
FEVER: 1
RHINORRHEA: 1
SHORTNESS OF BREATH: 0
GASTROINTESTINAL NEGATIVE: 1

## 2025-08-14 LAB — SARS-COV-2 RNA RESP QL NAA+PROBE: NEGATIVE

## (undated) DEVICE — GLOVE PROTEXIS BLUE W/NEU-THERA 7.0  2D73EB70

## (undated) DEVICE — STRAP KNEE/BODY 31143004

## (undated) DEVICE — SU MONOCRYL 0 CTB-1 36" YB946

## (undated) DEVICE — PREP CHLORAPREP 26ML TINTED ORANGE  260815

## (undated) DEVICE — STOCKING SLEEVE COMPRESSION CALF LG

## (undated) DEVICE — GLOVE ESTEEM POWDER FREE SMT 6.5  2D72PT65

## (undated) DEVICE — SU VICRYL 4-0 PS-2 18" UND J496G

## (undated) DEVICE — SOL WATER IRRIG 1000ML BOTTLE 07139-09

## (undated) DEVICE — CATH TRAY FOLEY 16FR SILICONE 907416

## (undated) DEVICE — SU VICRYL 0 CT-1 36" J346H

## (undated) DEVICE — SUCTION CANISTER MEDIVAC LINER 1500ML W/LID 65651-515

## (undated) DEVICE — BASIN SET MAJOR

## (undated) DEVICE — PACK C-SECTION LF PL15OTA83B

## (undated) DEVICE — SOL NACL 0.9% IRRIG 1000ML BOTTLE 07138-09

## (undated) DEVICE — DRSG TELFA ISLAND 4X10"

## (undated) RX ORDER — FENTANYL CITRATE 50 UG/ML
INJECTION, SOLUTION INTRAMUSCULAR; INTRAVENOUS
Status: DISPENSED
Start: 2017-07-03

## (undated) RX ORDER — MORPHINE SULFATE 1 MG/ML
INJECTION, SOLUTION EPIDURAL; INTRATHECAL; INTRAVENOUS
Status: DISPENSED
Start: 2017-07-03

## (undated) RX ORDER — PHENYLEPHRINE HCL IN 0.9% NACL 1 MG/10 ML
SYRINGE (ML) INTRAVENOUS
Status: DISPENSED
Start: 2017-07-03

## (undated) RX ORDER — ONDANSETRON 2 MG/ML
INJECTION INTRAMUSCULAR; INTRAVENOUS
Status: DISPENSED
Start: 2017-07-03